# Patient Record
Sex: MALE | Race: OTHER | HISPANIC OR LATINO
[De-identification: names, ages, dates, MRNs, and addresses within clinical notes are randomized per-mention and may not be internally consistent; named-entity substitution may affect disease eponyms.]

---

## 2017-07-06 ENCOUNTER — APPOINTMENT (OUTPATIENT)
Dept: INTERNAL MEDICINE | Facility: CLINIC | Age: 82
End: 2017-07-06

## 2017-07-07 ENCOUNTER — APPOINTMENT (OUTPATIENT)
Dept: INTERNAL MEDICINE | Facility: CLINIC | Age: 82
End: 2017-07-07

## 2017-07-07 ENCOUNTER — OUTPATIENT (OUTPATIENT)
Dept: OUTPATIENT SERVICES | Facility: HOSPITAL | Age: 82
LOS: 1 days | Discharge: HOME | End: 2017-07-07

## 2017-07-07 VITALS
DIASTOLIC BLOOD PRESSURE: 70 MMHG | SYSTOLIC BLOOD PRESSURE: 120 MMHG | WEIGHT: 149 LBS | BODY MASS INDEX: 29.25 KG/M2 | HEIGHT: 60 IN | RESPIRATION RATE: 17 BRPM | HEART RATE: 65 BPM

## 2017-07-07 DIAGNOSIS — Z86.39 PERSONAL HISTORY OF OTHER ENDOCRINE, NUTRITIONAL AND METABOLIC DISEASE: ICD-10-CM

## 2017-07-07 DIAGNOSIS — Z78.9 OTHER SPECIFIED HEALTH STATUS: ICD-10-CM

## 2017-07-07 DIAGNOSIS — M54.5 LOW BACK PAIN: ICD-10-CM

## 2017-07-07 DIAGNOSIS — Z80.9 FAMILY HISTORY OF MALIGNANT NEOPLASM, UNSPECIFIED: ICD-10-CM

## 2017-07-07 DIAGNOSIS — Z87.19 PERSONAL HISTORY OF OTHER DISEASES OF THE DIGESTIVE SYSTEM: ICD-10-CM

## 2017-07-07 DIAGNOSIS — Z86.69 PERSONAL HISTORY OF OTHER DISEASES OF THE NERVOUS SYSTEM AND SENSE ORGANS: ICD-10-CM

## 2017-07-07 DIAGNOSIS — G89.29 LOW BACK PAIN: ICD-10-CM

## 2017-07-10 ENCOUNTER — RESULT REVIEW (OUTPATIENT)
Age: 82
End: 2017-07-10

## 2017-07-11 DIAGNOSIS — K29.70 GASTRITIS, UNSPECIFIED, WITHOUT BLEEDING: ICD-10-CM

## 2017-07-11 DIAGNOSIS — M54.5 LOW BACK PAIN: ICD-10-CM

## 2017-07-11 DIAGNOSIS — H40.9 UNSPECIFIED GLAUCOMA: ICD-10-CM

## 2017-07-11 DIAGNOSIS — I10 ESSENTIAL (PRIMARY) HYPERTENSION: ICD-10-CM

## 2017-07-11 DIAGNOSIS — E11.9 TYPE 2 DIABETES MELLITUS WITHOUT COMPLICATIONS: ICD-10-CM

## 2017-07-14 LAB
25(OH)D3 SERPL-MCNC: 27 NG/ML
ALBUMIN SERPL-MCNC: 3.5 G/DL
ALBUMIN/GLOB SERPL: 0.9
ALP SERPL-CCNC: 237 IU/L
ALT SERPL-CCNC: 95 IU/L
ANION GAP SERPL CALC-SCNC: 8 MEQ/L
APPEARANCE UR: CLEAR
AST SERPL-CCNC: 87 IU/L
BASOPHILS # BLD: 0.03 TH/MM3
BASOPHILS NFR BLD: 0.5 %
BILIRUB SERPL-MCNC: 0.9 MG/DL
BILIRUB UR QL STRIP: NEGATIVE
BUN SERPL-MCNC: 16 MG/DL
BUN/CREAT SERPL: 14.5 %
CALCIUM SERPL-MCNC: 9.7 MG/DL
CHLORIDE SERPL-SCNC: 103 MEQ/L
CHOLEST SERPL-MCNC: 235 MG/DL
CO2 SERPL-SCNC: 27 MEQ/L
COLOR UR: YELLOW
CREAT SERPL-MCNC: 1.1 MG/DL
CREAT UR-MCNC: 117 MG/DL
DIFFERENTIAL METHOD BLD: NORMAL
EOSINOPHIL # BLD: 0.09 TH/MM3
EOSINOPHIL NFR BLD: 1.6 %
ERYTHROCYTE [DISTWIDTH] IN BLOOD BY AUTOMATED COUNT: 16 %
ESTIMATED AVERGAGE GLUCOSE (NORTH): 180 MG/DL
GFR SERPL CREATININE-BSD FRML MDRD: 64
GLUCOSE SERPL-MCNC: 135 MG/DL
GLUCOSE UR STRIP-MCNC: NEGATIVE MG/DL
GRANULOCYTES # BLD: 2.97 TH/MM3
GRANULOCYTES NFR BLD: 53.4 %
HBA1C MFR BLD: 7.9 %
HCT VFR BLD AUTO: 45.8 %
HDLC SERPL-MCNC: 40 MG/DL
HDLC SERPL: 5.88
HGB BLD-MCNC: 15.1 G/DL
HGB UR QL STRIP: NEGATIVE
IMM GRANULOCYTES # BLD: 0.01 TH/MM3
IMM GRANULOCYTES NFR BLD: 0.2 %
KETONES UR STRIP-MCNC: NEGATIVE MG/DL
LDLC SERPL DIRECT ASSAY-MCNC: 130 MG/DL
LYMPHOCYTES # BLD: 1.7 TH/MM3
LYMPHOCYTES NFR BLD: 30.5 %
MCH RBC QN AUTO: 30.9 PG
MCHC RBC AUTO-ENTMCNC: 33 G/DL
MCV RBC AUTO: 93.9 FL
MICROALBUMIN UR-MCNC: 1.5 MG/DL
MICROALBUMIN/CREAT UR-RTO: 13 MG/G
MONOCYTES # BLD: 0.77 TH/MM3
MONOCYTES NFR BLD: 13.8 %
NITRITE UR QL STRIP: NEGATIVE
PH UR STRIP: 6
PLATELET # BLD: 238 TH/MM3
PMV BLD AUTO: 10.7 FL
POTASSIUM SERPL-SCNC: 5 MMOL/L
PROT SERPL-MCNC: 7.4 G/DL
PROT UR STRIP-MCNC: NEGATIVE MG/DL
PSA FREE MFR FLD: 0.32 NG/ML
RBC # BLD AUTO: 4.88 MIL/MM3
SODIUM SERPL-SCNC: 138 MEQ/L
SP GR UR STRIP: 1.01
TRIGL SERPL-MCNC: 193 MG/DL
TSH SERPL DL<=0.005 MIU/L-ACNC: 3.65 UIU/ML
UROBILINOGEN UR STRIP-MCNC: 0.2 MG/DL
VITAMIN D2 SERPL-MCNC: <4 NG/ML
VITAMIN D3 SERPL-MCNC: 27 NG/ML
VLDLC SERPL-MCNC: 38 MG/DL
WBC # BLD: 5.57 TH/MM3
WBC URNS QL MICRO: NEGATIVE

## 2017-08-03 ENCOUNTER — APPOINTMENT (OUTPATIENT)
Dept: GERIATRICS | Facility: CLINIC | Age: 82
End: 2017-08-03

## 2017-10-13 ENCOUNTER — APPOINTMENT (OUTPATIENT)
Dept: GASTROENTEROLOGY | Facility: CLINIC | Age: 82
End: 2017-10-13

## 2018-04-20 ENCOUNTER — INPATIENT (INPATIENT)
Facility: HOSPITAL | Age: 83
LOS: 11 days | Discharge: HOME | End: 2018-05-02
Attending: HOSPITALIST | Admitting: HOSPITALIST

## 2018-04-20 VITALS
HEART RATE: 79 BPM | TEMPERATURE: 99 F | OXYGEN SATURATION: 98 % | DIASTOLIC BLOOD PRESSURE: 56 MMHG | SYSTOLIC BLOOD PRESSURE: 100 MMHG | RESPIRATION RATE: 18 BRPM

## 2018-04-20 LAB
ABO RH CONFIRMATION: SIGNIFICANT CHANGE UP
ALBUMIN SERPL ELPH-MCNC: 3 G/DL — LOW (ref 3.5–5.2)
ALBUMIN SERPL ELPH-MCNC: 3.3 G/DL — LOW (ref 3.5–5.2)
ALBUMIN SERPL ELPH-MCNC: 3.5 G/DL — SIGNIFICANT CHANGE UP (ref 3.5–5.2)
ALP SERPL-CCNC: 166 U/L — HIGH (ref 30–115)
ALP SERPL-CCNC: 179 U/L — HIGH (ref 30–115)
ALP SERPL-CCNC: 218 U/L — HIGH (ref 30–115)
ALT FLD-CCNC: 72 U/L — HIGH (ref 0–41)
ALT FLD-CCNC: 75 U/L — HIGH (ref 0–41)
ALT FLD-CCNC: 88 U/L — HIGH (ref 0–41)
ANION GAP SERPL CALC-SCNC: 13 MMOL/L — SIGNIFICANT CHANGE UP (ref 7–14)
ANION GAP SERPL CALC-SCNC: 14 MMOL/L — SIGNIFICANT CHANGE UP (ref 7–14)
ANION GAP SERPL CALC-SCNC: 15 MMOL/L — HIGH (ref 7–14)
APPEARANCE UR: (no result)
APTT BLD: 30.4 SEC — SIGNIFICANT CHANGE UP (ref 27–39.2)
AST SERPL-CCNC: 111 U/L — HIGH (ref 0–41)
AST SERPL-CCNC: 86 U/L — HIGH (ref 0–41)
AST SERPL-CCNC: 99 U/L — HIGH (ref 0–41)
BACTERIA # UR AUTO: (no result) /HPF
BASE EXCESS BLDV CALC-SCNC: -1 MMOL/L — SIGNIFICANT CHANGE UP (ref -2–2)
BILIRUB SERPL-MCNC: 0.7 MG/DL — SIGNIFICANT CHANGE UP (ref 0.2–1.2)
BILIRUB SERPL-MCNC: 0.7 MG/DL — SIGNIFICANT CHANGE UP (ref 0.2–1.2)
BILIRUB SERPL-MCNC: 0.9 MG/DL — SIGNIFICANT CHANGE UP (ref 0.2–1.2)
BILIRUB UR-MCNC: (no result)
BLD GP AB SCN SERPL QL: SIGNIFICANT CHANGE UP
BUN SERPL-MCNC: 19 MG/DL — SIGNIFICANT CHANGE UP (ref 10–20)
BUN SERPL-MCNC: 19 MG/DL — SIGNIFICANT CHANGE UP (ref 10–20)
BUN SERPL-MCNC: 20 MG/DL — SIGNIFICANT CHANGE UP (ref 10–20)
C DIFF BY PCR RESULT: NEGATIVE — SIGNIFICANT CHANGE UP
C DIFF TOX GENS STL QL NAA+PROBE: SIGNIFICANT CHANGE UP
CA-I SERPL-SCNC: 1.15 MMOL/L — SIGNIFICANT CHANGE UP (ref 1.12–1.3)
CALCIUM SERPL-MCNC: 7.7 MG/DL — LOW (ref 8.5–10.1)
CALCIUM SERPL-MCNC: 8.1 MG/DL — LOW (ref 8.5–10.1)
CALCIUM SERPL-MCNC: 8.5 MG/DL — SIGNIFICANT CHANGE UP (ref 8.5–10.1)
CHLORIDE SERPL-SCNC: 91 MMOL/L — LOW (ref 98–110)
CHLORIDE SERPL-SCNC: 96 MMOL/L — LOW (ref 98–110)
CHLORIDE SERPL-SCNC: 97 MMOL/L — LOW (ref 98–110)
CO2 SERPL-SCNC: 21 MMOL/L — SIGNIFICANT CHANGE UP (ref 17–32)
CO2 SERPL-SCNC: 22 MMOL/L — SIGNIFICANT CHANGE UP (ref 17–32)
CO2 SERPL-SCNC: 23 MMOL/L — SIGNIFICANT CHANGE UP (ref 17–32)
COD CRY URNS QL: (no result) /HPF
COLOR SPEC: (no result)
CREAT SERPL-MCNC: 1.4 MG/DL — SIGNIFICANT CHANGE UP (ref 0.7–1.5)
CREAT SERPL-MCNC: 1.4 MG/DL — SIGNIFICANT CHANGE UP (ref 0.7–1.5)
CREAT SERPL-MCNC: 1.6 MG/DL — HIGH (ref 0.7–1.5)
DIFF PNL FLD: NEGATIVE — SIGNIFICANT CHANGE UP
EPI CELLS # UR: (no result) /HPF
GAS PNL BLDV: 134 MMOL/L — LOW (ref 136–145)
GAS PNL BLDV: SIGNIFICANT CHANGE UP
GLUCOSE SERPL-MCNC: 153 MG/DL — HIGH (ref 70–99)
GLUCOSE SERPL-MCNC: 161 MG/DL — HIGH (ref 70–99)
GLUCOSE SERPL-MCNC: 193 MG/DL — HIGH (ref 70–99)
GLUCOSE UR QL: NEGATIVE MG/DL — SIGNIFICANT CHANGE UP
HCO3 BLDV-SCNC: 25 MMOL/L — SIGNIFICANT CHANGE UP (ref 22–29)
HCT VFR BLD CALC: 36.5 % — LOW (ref 42–52)
HCT VFR BLDA CALC: 31.4 % — LOW (ref 34–44)
HGB BLD CALC-MCNC: 10.2 G/DL — LOW (ref 14–18)
HGB BLD-MCNC: 12.8 G/DL — LOW (ref 14–18)
HYALINE CASTS # UR AUTO: (no result) /LPF
INR BLD: 1.04 RATIO — SIGNIFICANT CHANGE UP (ref 0.65–1.3)
KETONES UR-MCNC: 15
LACTATE BLDV-MCNC: 2.8 MMOL/L — HIGH (ref 0.5–1.6)
LACTATE SERPL-SCNC: 4.3 MMOL/L — CRITICAL HIGH (ref 0.5–2.2)
LEUKOCYTE ESTERASE UR-ACNC: (no result)
LIDOCAIN IGE QN: 28 U/L — SIGNIFICANT CHANGE UP (ref 7–60)
MCHC RBC-ENTMCNC: 32.3 PG — HIGH (ref 27–31)
MCHC RBC-ENTMCNC: 35.1 G/DL — SIGNIFICANT CHANGE UP (ref 32–37)
MCV RBC AUTO: 92.2 FL — SIGNIFICANT CHANGE UP (ref 80–94)
NITRITE UR-MCNC: POSITIVE
NRBC # BLD: 0 /100 WBCS — SIGNIFICANT CHANGE UP (ref 0–0)
PCO2 BLDV: 46 MMHG — SIGNIFICANT CHANGE UP (ref 41–51)
PH BLDV: 7.34 — SIGNIFICANT CHANGE UP (ref 7.26–7.43)
PH UR: 5.5 — SIGNIFICANT CHANGE UP (ref 5–8)
PLATELET # BLD AUTO: 218 K/UL — SIGNIFICANT CHANGE UP (ref 130–400)
PO2 BLDV: 27 MMHG — SIGNIFICANT CHANGE UP (ref 20–40)
POTASSIUM BLDV-SCNC: 5.2 MMOL/L — SIGNIFICANT CHANGE UP (ref 3.3–5.6)
POTASSIUM SERPL-MCNC: 5.8 MMOL/L — HIGH (ref 3.5–5)
POTASSIUM SERPL-MCNC: 5.9 MMOL/L — HIGH (ref 3.5–5)
POTASSIUM SERPL-MCNC: 6.5 MMOL/L — CRITICAL HIGH (ref 3.5–5)
POTASSIUM SERPL-SCNC: 5.8 MMOL/L — HIGH (ref 3.5–5)
POTASSIUM SERPL-SCNC: 5.9 MMOL/L — HIGH (ref 3.5–5)
POTASSIUM SERPL-SCNC: 6.5 MMOL/L — CRITICAL HIGH (ref 3.5–5)
PROT SERPL-MCNC: 6 G/DL — SIGNIFICANT CHANGE UP (ref 6–8)
PROT SERPL-MCNC: 6.3 G/DL — SIGNIFICANT CHANGE UP (ref 6–8)
PROT SERPL-MCNC: 7.2 G/DL — SIGNIFICANT CHANGE UP (ref 6–8)
PROT UR-MCNC: 30 MG/DL
PROTHROM AB SERPL-ACNC: 11.2 SEC — SIGNIFICANT CHANGE UP (ref 9.95–12.87)
RBC # BLD: 3.96 M/UL — LOW (ref 4.7–6.1)
RBC # FLD: 14.8 % — HIGH (ref 11.5–14.5)
SAO2 % BLDV: 52 % — SIGNIFICANT CHANGE UP
SODIUM SERPL-SCNC: 128 MMOL/L — LOW (ref 135–146)
SODIUM SERPL-SCNC: 130 MMOL/L — LOW (ref 135–146)
SODIUM SERPL-SCNC: 134 MMOL/L — LOW (ref 135–146)
SP GR SPEC: 1.02 — SIGNIFICANT CHANGE UP (ref 1.01–1.03)
TYPE + AB SCN PNL BLD: SIGNIFICANT CHANGE UP
UROBILINOGEN FLD QL: 1 MG/DL (ref 0.2–0.2)
WBC # BLD: 16.11 K/UL — HIGH (ref 4.8–10.8)
WBC # FLD AUTO: 16.11 K/UL — HIGH (ref 4.8–10.8)
WBC UR QL: (no result) /HPF

## 2018-04-20 RX ORDER — METRONIDAZOLE 500 MG
500 TABLET ORAL ONCE
Qty: 0 | Refills: 0 | Status: COMPLETED | OUTPATIENT
Start: 2018-04-20 | End: 2018-04-20

## 2018-04-20 RX ORDER — CEFTRIAXONE 500 MG/1
1 INJECTION, POWDER, FOR SOLUTION INTRAMUSCULAR; INTRAVENOUS EVERY 24 HOURS
Qty: 0 | Refills: 0 | Status: DISCONTINUED | OUTPATIENT
Start: 2018-04-21 | End: 2018-04-21

## 2018-04-20 RX ORDER — SODIUM CHLORIDE 9 MG/ML
1000 INJECTION INTRAMUSCULAR; INTRAVENOUS; SUBCUTANEOUS ONCE
Qty: 0 | Refills: 0 | Status: COMPLETED | OUTPATIENT
Start: 2018-04-20 | End: 2018-04-20

## 2018-04-20 RX ORDER — CEFTRIAXONE 500 MG/1
1 INJECTION, POWDER, FOR SOLUTION INTRAMUSCULAR; INTRAVENOUS ONCE
Qty: 0 | Refills: 0 | Status: COMPLETED | OUTPATIENT
Start: 2018-04-20 | End: 2018-04-20

## 2018-04-20 RX ORDER — CIPROFLOXACIN LACTATE 400MG/40ML
400 VIAL (ML) INTRAVENOUS EVERY 12 HOURS
Qty: 0 | Refills: 0 | Status: DISCONTINUED | OUTPATIENT
Start: 2018-04-20 | End: 2018-04-21

## 2018-04-20 RX ORDER — CEFTRIAXONE 500 MG/1
INJECTION, POWDER, FOR SOLUTION INTRAMUSCULAR; INTRAVENOUS
Qty: 0 | Refills: 0 | Status: DISCONTINUED | OUTPATIENT
Start: 2018-04-20 | End: 2018-04-21

## 2018-04-20 RX ORDER — FAMOTIDINE 10 MG/ML
20 INJECTION INTRAVENOUS ONCE
Qty: 0 | Refills: 0 | Status: COMPLETED | OUTPATIENT
Start: 2018-04-20 | End: 2018-04-20

## 2018-04-20 RX ORDER — ONDANSETRON 8 MG/1
4 TABLET, FILM COATED ORAL ONCE
Qty: 0 | Refills: 0 | Status: COMPLETED | OUTPATIENT
Start: 2018-04-20 | End: 2018-04-20

## 2018-04-20 RX ORDER — METRONIDAZOLE 500 MG
TABLET ORAL
Qty: 0 | Refills: 0 | Status: DISCONTINUED | OUTPATIENT
Start: 2018-04-20 | End: 2018-04-21

## 2018-04-20 RX ORDER — METRONIDAZOLE 500 MG
500 TABLET ORAL EVERY 8 HOURS
Qty: 0 | Refills: 0 | Status: DISCONTINUED | OUTPATIENT
Start: 2018-04-21 | End: 2018-04-21

## 2018-04-20 RX ADMIN — ONDANSETRON 4 MILLIGRAM(S): 8 TABLET, FILM COATED ORAL at 16:50

## 2018-04-20 RX ADMIN — Medication 200 MILLIGRAM(S): at 21:08

## 2018-04-20 RX ADMIN — SODIUM CHLORIDE 1000 MILLILITER(S): 9 INJECTION INTRAMUSCULAR; INTRAVENOUS; SUBCUTANEOUS at 18:49

## 2018-04-20 RX ADMIN — SODIUM CHLORIDE 1000 MILLILITER(S): 9 INJECTION INTRAMUSCULAR; INTRAVENOUS; SUBCUTANEOUS at 16:50

## 2018-04-20 RX ADMIN — FAMOTIDINE 20 MILLIGRAM(S): 10 INJECTION INTRAVENOUS at 16:51

## 2018-04-20 RX ADMIN — Medication 100 MILLIGRAM(S): at 20:23

## 2018-04-20 RX ADMIN — CEFTRIAXONE 100 GRAM(S): 500 INJECTION, POWDER, FOR SOLUTION INTRAMUSCULAR; INTRAVENOUS at 18:49

## 2018-04-20 NOTE — ED PROVIDER NOTE - PHYSICAL EXAMINATION
On exam: Pt sitting comfortably on stretcher in NAD. No rash. Conjunctivae and sclera clear. dry MM. RRR, radial pulses 2/4 b/l. Breath sounds present b/l, CTABL, no wheezing or crackles, no tachypnea, no accessory muscle use, good resp effort and excursion, good air exchange, bs present throughout all 4 quadrants. Generalized abd pain, Abd soft, no rebound or guarding, no cvat, (-) murphys, (-) rovsing (-) obturator (-) psoas. FROM of ext, no edema, no calf pain/swelling/erythema. AAOx3, motor 5/5 and sensation intact throughout upper and lower ext.

## 2018-04-20 NOTE — ED PROVIDER NOTE - PROGRESS NOTE DETAILS
Plan: EKG, CXR, labs, IVF, Zofran, Pepcid, urine, imaging, and reassess. pt with uti, abx added, pending rest of labs and imaging,will continue to monitor and reassess. lactate noted, more fluids being given, will repeat. ct noted. abx added, ruq sono also added, cmp re sent will also get vbg for lactate, will continue to monitor and reassess. rectal exam done: brown stool, no melena/brbpr, prostrate examined, no bogginess, no purulent discharge, no pain to palpation. cmp hemolyzed again, lab bryant dmultiple times regarding second cmp hemolyzed, nurse preeti it twice, vbg being sent. pt at ultrasound pt and family aware of all labs and imaging, gnetle fluid hydration due to mild pulmonary edema given, repeat lactate 2.8, pt abd re exam soft nd nt reports feeling better, aware of plan for admission, severe sepsis secondary to uti, colitis, MAR. Dr Madrigal, aware of pt. Dr. Dean Hospitalist aware of pt and admission. pt eating, tolerating po, feeling better at this time.

## 2018-04-20 NOTE — ED PROVIDER NOTE - CARE PLAN
Principal Discharge DX:	Severe sepsis  Secondary Diagnosis:	Colitis  Secondary Diagnosis:	UTI (urinary tract infection)

## 2018-04-20 NOTE — ED PROVIDER NOTE - OBJECTIVE STATEMENT
81 y/o male Sammarinese speaking, family at bedside are translating at the pt’s request, pmhx of DM and depression, recently visiting from [place] came 2 weeks ago, presents for generalized abd pain. No hx of abd surgeries. Associated with diarrhea NB, N/V NBNB, few episodes. No one at home with similar symptoms. Pt notes he ate marquez this morning. Also associated generalized weakness. 81 y/o male Eritrean speaking, family at bedside are translating at the pt’s request, pmhx of DM and depression, recently visiting from [place] came 2 weeks ago, presents for generalized abd pain. No hx of abd surgeries. Associated with diarrhea NB, N/V NBNB, few episodes. No one at home with similar symptoms. Pt notes he ate marquez this morning. Also associated generalized weakness. no etoh abuse. 83 y/o male Cameroonian speaking, family at bedside are translating at the pt’s request, pmhx of DM and depression, recently visiting from [place] came 2 weeks ago, presents for generalized abd pain. No hx of abd surgeries. Associated with diarrhea NB, N/V NBNB, few episodes. No penile pain/discharge, testicular pain/swelling/ erythema, tenesmus. No one at home with similar symptoms. Pt notes he ate marquez this morning. Also associated generalized weakness. no etoh abuse. 83 y/o male Pakistani speaking, family at bedside are translating at the pt’s request, pmhx of DM and depression, recently visiting from [place] came 2 weeks ago, presents for generalized abd pain. No hx of abd surgeries. Associated with diarrhea NB, N/V NBNB, few episodes that started this moring. No penile pain/discharge, testicular pain/swelling/ erythema, tenesmus. No one at home with similar symptoms. Pt notes he ate marquez this morning. Also associated generalized weakness. no etoh abuse.

## 2018-04-20 NOTE — ED PROVIDER NOTE - MEDICAL DECISION MAKING DETAILS
pt aware of all labs and imaging including abnormal liver findings, understands abx given for colitis and uti, plan for admission and agrees, pt faimly at bedside also aware and understand, medical admitting team aware of pt as well.

## 2018-04-20 NOTE — ED PROVIDER NOTE - NS ED ROS FT
Denies fevers/chills, cough, SOB, chest pain, pleuritic CP, constipation, melena/ BRBPR, urinary SX, skin rashes, recent travel or sick contacts. (+) generalzied abd pain, nausea, vomiting, and diarrhea, non-bloody. Denies fevers/chills, cough, SOB, chest pain, pleuritic CP, constipation, melena/ BRBPR, urinary SX, skin rashes, recent travel or sick contacts.

## 2018-04-20 NOTE — ED PROVIDER NOTE - NS_ ATTENDINGSCRIBEDETAILS _ED_A_ED_FT
I have seen this patient with a scribe. Please see documentation of my history and physical examination and plan. I agree with scribe documentation except as indicated in my notes.

## 2018-04-21 LAB
ALBUMIN SERPL ELPH-MCNC: 3.2 G/DL — LOW (ref 3.5–5.2)
ALP SERPL-CCNC: 176 U/L — HIGH (ref 30–115)
ALT FLD-CCNC: 65 U/L — HIGH (ref 0–41)
ANION GAP SERPL CALC-SCNC: 14 MMOL/L — SIGNIFICANT CHANGE UP (ref 7–14)
AST SERPL-CCNC: 72 U/L — HIGH (ref 0–41)
BASOPHILS # BLD AUTO: 0.04 K/UL — SIGNIFICANT CHANGE UP (ref 0–0.2)
BASOPHILS NFR BLD AUTO: 0.3 % — SIGNIFICANT CHANGE UP (ref 0–1)
BILIRUB SERPL-MCNC: 0.7 MG/DL — SIGNIFICANT CHANGE UP (ref 0.2–1.2)
BUN SERPL-MCNC: 17 MG/DL — SIGNIFICANT CHANGE UP (ref 10–20)
CALCIUM SERPL-MCNC: 8.2 MG/DL — LOW (ref 8.5–10.1)
CHLORIDE SERPL-SCNC: 95 MMOL/L — LOW (ref 98–110)
CO2 SERPL-SCNC: 20 MMOL/L — SIGNIFICANT CHANGE UP (ref 17–32)
CREAT SERPL-MCNC: 1.3 MG/DL — SIGNIFICANT CHANGE UP (ref 0.7–1.5)
CULTURE RESULTS: SIGNIFICANT CHANGE UP
EOSINOPHIL # BLD AUTO: 0.2 K/UL — SIGNIFICANT CHANGE UP (ref 0–0.7)
EOSINOPHIL NFR BLD AUTO: 1.6 % — SIGNIFICANT CHANGE UP (ref 0–8)
GLUCOSE SERPL-MCNC: 285 MG/DL — HIGH (ref 70–99)
HCT VFR BLD CALC: 35.4 % — LOW (ref 42–52)
HGB BLD-MCNC: 12.1 G/DL — LOW (ref 14–18)
IMM GRANULOCYTES NFR BLD AUTO: 0.3 % — SIGNIFICANT CHANGE UP (ref 0.1–0.3)
LYMPHOCYTES # BLD AUTO: 1.97 K/UL — SIGNIFICANT CHANGE UP (ref 1.2–3.4)
LYMPHOCYTES # BLD AUTO: 15.7 % — LOW (ref 20.5–51.1)
MAGNESIUM SERPL-MCNC: 1.8 MG/DL — SIGNIFICANT CHANGE UP (ref 1.8–2.4)
MCHC RBC-ENTMCNC: 31.3 PG — HIGH (ref 27–31)
MCHC RBC-ENTMCNC: 34.2 G/DL — SIGNIFICANT CHANGE UP (ref 32–37)
MCV RBC AUTO: 91.5 FL — SIGNIFICANT CHANGE UP (ref 80–94)
MONOCYTES # BLD AUTO: 1.02 K/UL — HIGH (ref 0.1–0.6)
MONOCYTES NFR BLD AUTO: 8.1 % — SIGNIFICANT CHANGE UP (ref 1.7–9.3)
NEUTROPHILS # BLD AUTO: 9.31 K/UL — HIGH (ref 1.4–6.5)
NEUTROPHILS NFR BLD AUTO: 74 % — SIGNIFICANT CHANGE UP (ref 42.2–75.2)
PLATELET # BLD AUTO: 210 K/UL — SIGNIFICANT CHANGE UP (ref 130–400)
POTASSIUM SERPL-MCNC: 4.5 MMOL/L — SIGNIFICANT CHANGE UP (ref 3.5–5)
POTASSIUM SERPL-SCNC: 4.5 MMOL/L — SIGNIFICANT CHANGE UP (ref 3.5–5)
PROT SERPL-MCNC: 6.4 G/DL — SIGNIFICANT CHANGE UP (ref 6–8)
RBC # BLD: 3.87 M/UL — LOW (ref 4.7–6.1)
RBC # FLD: 14.9 % — HIGH (ref 11.5–14.5)
SODIUM SERPL-SCNC: 129 MMOL/L — LOW (ref 135–146)
SPECIMEN SOURCE: SIGNIFICANT CHANGE UP
WBC # BLD: 12.58 K/UL — HIGH (ref 4.8–10.8)
WBC # FLD AUTO: 12.58 K/UL — HIGH (ref 4.8–10.8)

## 2018-04-21 RX ORDER — CEFTRIAXONE 500 MG/1
2 INJECTION, POWDER, FOR SOLUTION INTRAMUSCULAR; INTRAVENOUS EVERY 24 HOURS
Qty: 0 | Refills: 0 | Status: DISCONTINUED | OUTPATIENT
Start: 2018-04-21 | End: 2018-05-02

## 2018-04-21 RX ORDER — SODIUM CHLORIDE 9 MG/ML
1000 INJECTION INTRAMUSCULAR; INTRAVENOUS; SUBCUTANEOUS
Qty: 0 | Refills: 0 | Status: DISCONTINUED | OUTPATIENT
Start: 2018-04-21 | End: 2018-04-21

## 2018-04-21 RX ORDER — ACETAMINOPHEN 500 MG
650 TABLET ORAL EVERY 6 HOURS
Qty: 0 | Refills: 0 | Status: DISCONTINUED | OUTPATIENT
Start: 2018-04-21 | End: 2018-05-02

## 2018-04-21 RX ORDER — ONDANSETRON 8 MG/1
4 TABLET, FILM COATED ORAL EVERY 6 HOURS
Qty: 0 | Refills: 0 | Status: DISCONTINUED | OUTPATIENT
Start: 2018-04-21 | End: 2018-05-02

## 2018-04-21 RX ORDER — METRONIDAZOLE 500 MG
500 TABLET ORAL EVERY 8 HOURS
Qty: 0 | Refills: 0 | Status: DISCONTINUED | OUTPATIENT
Start: 2018-04-21 | End: 2018-05-02

## 2018-04-21 RX ORDER — PANTOPRAZOLE SODIUM 20 MG/1
40 TABLET, DELAYED RELEASE ORAL
Qty: 0 | Refills: 0 | Status: DISCONTINUED | OUTPATIENT
Start: 2018-04-21 | End: 2018-05-02

## 2018-04-21 RX ADMIN — CEFTRIAXONE 100 GRAM(S): 500 INJECTION, POWDER, FOR SOLUTION INTRAMUSCULAR; INTRAVENOUS at 13:29

## 2018-04-21 RX ADMIN — PANTOPRAZOLE SODIUM 40 MILLIGRAM(S): 20 TABLET, DELAYED RELEASE ORAL at 09:04

## 2018-04-21 RX ADMIN — Medication 100 MILLIGRAM(S): at 13:29

## 2018-04-21 RX ADMIN — Medication 100 MILLIGRAM(S): at 05:02

## 2018-04-21 RX ADMIN — Medication 100 MILLIGRAM(S): at 21:31

## 2018-04-21 NOTE — H&P ADULT - PMH
Cirrhosis    Depression    DM (diabetes mellitus)    HTN (hypertension)    UTI (urinary tract infection)

## 2018-04-21 NOTE — H&P ADULT - ASSESSMENT
# Sepsis with lactemia  -likely 2/2 Colitis extending from the cecum to the splenic flexure and UTI  -c/w cipro+ flagyl  -blood culture pending  - lactate 4.3-->2.8  - will RPT LABS    # JOSE with Electrolyte abnormalities  a- hyponatremia- Improving  b- Hyperkalemia- Improving VBG- 5.2  c- JOSE- resolved    #Mass within the hepatic dome and Cirrhosis  -CT shows evidence of cirrhosis  - u/s Nodular hepatic contour, consistent with cirrhosis  - Elevated LFTS  - o/p f/u 83 y/o Divehi speaking male from [place] with a hx of DM, HTN was BIB by family  1 episode of vomiting and 8 episodes of diarrhea.    # Sepsis with lacticemia  -likely 2/2 Colitis extending from the cecum to the splenic flexure and UTI   -c/w Rocephin + flagyl  -blood culture pending  -Ucx pending  - lactate 4.3-->2.8  - Start clears   - Will hold IVF given bibasilar crackles. Wont start lasix at this point unless pt becomes SOB    # JOSE with Electrolyte abnormalities  a- hyponatremia- Improving  b- Hyperkalemia- Improving VBG- 5.2  c- JOSE- resolved    # Diarrhea  - Improving  - stool studies pending  - Pt has a hx of diarrhea in the past ? colitis vs malignancy    #Mass within the hepatic dome and Cirrhosis ? HCC  - Family unaware of cirrhosis- no ETOH use  - hepatitis panel pending, HIV pending  -CT shows evidence of cirrhosis  - U/S shows Nodular hepatic contour, consistent with cirrhosis  - Elevated LFTS  - o/p f/u for MRI to r/o HCC  - Advised to f/u @ the [facility] clinic     # DM  -  c/w FS and start Insulin    # HTN  - Family will bring medications tomorrow     # Disposition  -d/c home  - Pt to return to [place] and then come back  -c/w protonix  - c/w heparin 81 y/o Occitan speaking male from [place] with a hx of DM, HTN was BIB by family  1 episode of vomiting and 8 episodes of diarrhea.    # Sepsis with lacticemia  - T 100.6 and leukocytosis   -likely 2/2 Colitis extending from the cecum to the splenic flexure and UTI   -c/w Rocephin + flagyl  -blood culture pending  -Ucx pending  - lactate 4.3-->2.8  - Start clears   - Will hold IVF given bibasilar crackles. Wont start lasix at this point unless pt becomes SOB    # JOSE with Electrolyte abnormalities  a- hyponatremia- Improving  b- Hyperkalemia- Improving VBG- 5.2  c- JOSE- resolved    # Diarrhea  - Improving  - stool studies pending  - c diff negative. Start Imodium if diarrhea continues  -Advised to increase fluid intake   - Pt has a hx of diarrhea in the past ? colitis vs malignancy    #Mass within the hepatic dome and Cirrhosis ? HCC  - Family unaware of cirrhosis- no ETOH use  - hepatitis panel pending, HIV pending  - CT shows evidence of cirrhosis  - U/S shows Nodular hepatic contour, consistent with cirrhosis  - Elevated LFTS  - o/p f/u for MRI to r/o HCC  - Advised to f/u @ the [facility] clinic     # DM  -  c/w FS and start Insulin    # HTN  - Family will bring medications tomorrow     # Disposition  -d/c home  - Pt to return to [place] and then come back  -c/w protonix  - c/w heparin

## 2018-04-21 NOTE — H&P ADULT - NSHPLABSRESULTS_GEN_ALL_CORE
T(F): 101.4  HR: 64  BP: 117/60  RR: 18  SpO2: 95%                          12.8   16.11 )-----------( 218      ( 2018 17:06 )             36.5       04-20    134<L>  |  97<L>  |  19  ----------------------------<  153<H>  5.8<H>   |  22  |  1.4    Ca    8.1<L>      2018 21:00    TPro  6.3  /  Alb  3.3<L>  /  TBili  0.7  /  DBili  x   /  AST  86<H>  /  ALT  75<H>  /  AlkPhos  179<H>        LIVER FUNCTIONS - ( 2018 21:00 )  Alb: 3.3 g/dL / Pro: 6.3 g/dL / ALK PHOS: 179 U/L / ALT: 75 U/L / AST: 86 U/L / GGT: x             PT/INR - ( 2018 17:06 )   PT: 11.20 sec;   INR: 1.04 ratio         PTT - ( 2018 17:06 )  PTT:30.4 sec          Urinalysis Basic - ( 2018 17:06 )    Color: Orange / Appearance: Cloudy / S.025 / pH: x  Gluc: x / Ketone: 15  / Bili: Small / Urobili: 1.0 mg/dL   Blood: x / Protein: 30 mg/dL / Nitrite: Positive   Leuk Esterase: Small / RBC: x / WBC 6-10 /HPF   Sq Epi: x / Non Sq Epi: Few /HPF / Bacteria: Few /HPF      CT A+P w/IV C  1. Colitis extending from the cecum to the splenic flexure. Differential   etiologies include infectious, inflammatory, and ischemic processes.  2. Pericholecystic inflammation, likely secondary to the colitis.  3. Cirrhosis. Mass within the hepatic dome as above. Nonemergent MRI with   and without contrast is recommended to exclude hepatocellular carcinoma.  4. Evidence of mild pulmonary edema at the lung bases.    u/s  1.  Nodular hepatic contour, consistent with cirrhosis. Mass seen on CT   not discretely identified.    2.  Nonspecific mild pericholecystic fluid and borderline gallbladder   wall thickening. No sonographic evidence for cholecystitis.    EKG  HR- 66  Normal Axis  Q waves leads III-aVF  RVH

## 2018-04-21 NOTE — H&P ADULT - NSHPPHYSICALEXAM_GEN_ALL_CORE
T(F): 101.4  HR: 64  BP: 117/60  RR: 18  SpO2: 95%      PHYSICAL EXAM:  GENERAL: NAD, well-developed  HEAD:  Atraumatic, Normocephalic  EYES: EOMI, PERRLA, conjunctiva and sclera clear  NECK: Supple, No JVD  CHEST/LUNG: Clear to auscultation bilaterally; No wheeze  HEART: Regular rate and rhythm; No murmurs, rubs, or gallops  ABDOMEN: Soft, Nontender, Nondistended; Bowel sounds present  EXTREMITIES:  2+ Peripheral Pulses, No clubbing, cyanosis, or edema  PSYCH: AAOx3  NEUROLOGY: non-focal  SKIN: No rashes or lesions T(F): 101.4  HR: 64  BP: 117/60  RR: 18  SpO2: 95%      PHYSICAL EXAM:  GENERAL: NAD, well-developed  HEAD:  Atraumatic, Normocephalic  EYES: EOMI, PERRLA, conjunctiva and sclera clear  NECK: Supple, No JVD  CHEST/LUNG: bibasilar crackles   HEART: Regular rate and rhythm; No murmurs, rubs, or gallops  ABDOMEN: Soft, Nontender, Nondistended; Bowel sounds present. Neg. Lopez sign   EXTREMITIES:  2+ Peripheral Pulses, No clubbing, cyanosis, or edema  PSYCH: AAOx3  NEUROLOGY: non-focal  SKIN: No rashes or lesions

## 2018-04-21 NOTE — H&P ADULT - HISTORY OF PRESENT ILLNESS
81 y/o Maori speaking male from [place] with a hx of DM, HTN was BIB by family  1 episode of vomiting and 8 episodes of diarrhea.    Per daughter at bedside, pt arrived approx. 2 weeks ago and has been eating at home. He suddenly developed diarrhea this AM with nasuea and an episode of vomiting. She states he has had diarrhea in [place] approx. 6 months apart and was told he had viral infection. He has also had UTI in the past. Presently, pt and family denied a known hx of cirrhosis, ETOH use, blood transfusions, known hx of hepatitis. Pt does attest to weight loss over a course of 1 year although he cannot quantify and also tells me he has poor appetite. He complains of mild dysuria and urinary urgency, but does empty his bladder. Since being admitted to , he did not report further vomiting, but does complain of nausea.   He denied fevers, chills, SOB, CP or weakness     ** Pt's daughter will bring the medications in the morning

## 2018-04-22 LAB
HBV CORE AB SER-ACNC: SIGNIFICANT CHANGE UP
HBV SURFACE AB SER-ACNC: SIGNIFICANT CHANGE UP
HBV SURFACE AG SER-ACNC: SIGNIFICANT CHANGE UP
HCV AB S/CO SERPL IA: 0.33 S/CO — SIGNIFICANT CHANGE UP
HCV AB SERPL-IMP: SIGNIFICANT CHANGE UP

## 2018-04-22 RX ADMIN — PANTOPRAZOLE SODIUM 40 MILLIGRAM(S): 20 TABLET, DELAYED RELEASE ORAL at 08:14

## 2018-04-22 RX ADMIN — CEFTRIAXONE 100 GRAM(S): 500 INJECTION, POWDER, FOR SOLUTION INTRAMUSCULAR; INTRAVENOUS at 13:40

## 2018-04-22 RX ADMIN — Medication 100 MILLIGRAM(S): at 05:49

## 2018-04-22 RX ADMIN — Medication 100 MILLIGRAM(S): at 15:04

## 2018-04-22 RX ADMIN — Medication 100 MILLIGRAM(S): at 21:23

## 2018-04-22 NOTE — PROGRESS NOTE ADULT - SUBJECTIVE AND OBJECTIVE BOX
SUBJECTIVE:    Patient is a 82y old  Male who presents with a chief complaint of Diarrhea (2018 00:03)    Currently admitted to medicine with the primary diagnosis of Severe sepsis     Today is hospital day 2d. This morning he is resting in bed.     PAST MEDICAL & SURGICAL HISTORY  PAST MEDICAL & SURGICAL HISTORY:  HTN (hypertension)  DM (diabetes mellitus)  UTI (urinary tract infection)  Depression  Cirrhosis  No significant past surgical history    SOCIAL HISTORY:    ALLERGIES:  No Known Allergies    MEDICATIONS:  STANDING MEDICATIONS  cefTRIAXone   IVPB 2 Gram(s) IV Intermittent every 24 hours  metroNIDAZOLE  IVPB 500 milliGRAM(s) IV Intermittent every 8 hours  pantoprazole    Tablet 40 milliGRAM(s) Oral before breakfast    PRN MEDICATIONS  acetaminophen   Tablet 650 milliGRAM(s) Oral every 6 hours PRN  ondansetron Injectable 4 milliGRAM(s) IV Push every 6 hours PRN    VITALS:   T(F): 96  HR: 60  BP: 128/59  RR: 20  SpO2: --    LABS:                        12.1   12.58 )-----------( 210      ( 2018 08:43 )             35.4     -    129<L>  |  95<L>  |  17  ----------------------------<  285<H>  4.5   |  20  |  1.3    Ca    8.2<L>      2018 08:43  Mg     1.8         TPro  6.4  /  Alb  3.2<L>  /  TBili  0.7  /  DBili  x   /  AST  72<H>  /  ALT  65<H>  /  AlkPhos  176<H>      PT/INR - ( 2018 17:06 )   PT: 11.20 sec;   INR: 1.04 ratio         PTT - ( 2018 17:06 )  PTT:30.4 sec  Urinalysis Basic - ( 2018 17:06 )    Color: Orange / Appearance: Cloudy / S.025 / pH: x  Gluc: x / Ketone: 15  / Bili: Small / Urobili: 1.0 mg/dL   Blood: x / Protein: 30 mg/dL / Nitrite: Positive   Leuk Esterase: Small / RBC: x / WBC 6-10 /HPF   Sq Epi: x / Non Sq Epi: Few /HPF / Bacteria: Few /HPF            Culture - Urine (collected 2018 17:06)  Source: .Urine Clean Catch (Midstream)  Final Report (2018 22:40):    Culture grew 3 or more types of organisms which indicate    collection contamination; consider recollection only if clinically    indicated.          RADIOLOGY:    PHYSICAL EXAM:  GEN: No acute distress  HEENT: NC/AT  LUNGS: bibasilar crackles  HEART: S1/S2 present.   ABD: Soft, non-tender, non-distended. Bowel sounds present  EXT: no edema  NEURO: AAOX3, Djiboutian speaking

## 2018-04-22 NOTE — CONSULT NOTE ADULT - SUBJECTIVE AND OBJECTIVE BOX
Chief Complaint:  Patient is a 82y old  Male who presents with a chief complaint of Diarrhea (2018 00:03)      HPI:  81 y/o Burundian speaking male from [place] with a hx of DM, HTN was BIB by family  1 episode of vomiting and 8 episodes of diarrhea.    Per daughter at bedside, pt arrived approx. 2 weeks ago and has been eating at home. He suddenly developed diarrhea this AM with nasuea and an episode of vomiting. She states he has had diarrhea in [place] approx. 6 months apart and was told he had viral infection. He has also had UTI in the past. Presently, pt and family denied a known hx of cirrhosis, ETOH use, blood transfusions, known hx of hepatitis. Pt does attest to weight loss over a course of 1 year although he cannot quantify and also tells me he has poor appetite. He complains of mild dysuria and urinary urgency, but does empty his bladder. Since being admitted to , he did not report further vomiting, but does complain of nausea. patient had EGD 10 yrs ago that showed gastritis, never had colonoscopy. no FH of cancer colon. +ve FH of cancer stomach.  He denied fevers, chills, SOB, CP or weakness     ** Pt's daughter will bring the medications in the morning     Allergies:  No Known Allergies      Home Medications:    Hospital Medications:  acetaminophen   Tablet 650 milliGRAM(s) Oral every 6 hours PRN  cefTRIAXone   IVPB 2 Gram(s) IV Intermittent every 24 hours  metroNIDAZOLE  IVPB 500 milliGRAM(s) IV Intermittent every 8 hours  ondansetron Injectable 4 milliGRAM(s) IV Push every 6 hours PRN  pantoprazole    Tablet 40 milliGRAM(s) Oral before breakfast      PMHX/PSHX:  HTN (hypertension)  DM (diabetes mellitus)  UTI (urinary tract infection)  Depression  Cirrhosis  No significant past surgical history      Family history:  No pertinent family history in first degree relatives      Social History:     ROS:   Eyes:  Good vision, no reported pain  ENT:  No sore throat, pain, runny nose, dysphagia  CV:  No pain, palpitations, hypo/hypertension  Resp:  No dyspnea, cough, tachypnea, wheezing  GI:  see H&P  :  No pain, bleeding, incontinence, nocturia  Muscle:  No pain, weakness  Neuro:  No weakness, tingling, memory problems  Psych:  No fatigue, insomnia, mood problems, depression  Endocrine:  No polyuria, polydipsia, cold/heat intolerance  Heme:  No petechiae, ecchymosis, easy bruisability  Skin:  No rash, tattoos, scars, edema      PHYSICAL EXAM:   Vital Signs:  Vital Signs Last 24 Hrs  T(C): 36.2 (2018 13:09), Max: 37.1 (2018 21:25)  T(F): 97.2 (2018 13:09), Max: 98.7 (2018 21:25)  HR: 56 (2018 13:09) (56 - 68)  BP: 129/63 (2018 13:09) (128/59 - 175/77)  BP(mean): --  RR: 18 (2018 13:09) (18 - 20)  SpO2: --  Daily     Daily     GENERAL:  Appears stated age, well-groomed, well-nourished, no distress  HEENT:  NC/AT,  conjunctivae clear and pink, no thyromegaly, nodules, adenopathy, no JVD, sclera -anicteric  CHEST:  Full & symmetric excursion, no increased effort, breath sounds clear  HEART:  Regular rhythm, S1, S2, no murmur/rub/S3/S4, no abdominal bruit, no edema  ABDOMEN:  Soft, non-tender, non-distended, normoactive bowel sounds,  no masses ,no hepato-splenomegaly,   EXTEREMITIES:  no cyanosis,clubbing or edema  SKIN:  No rash/erythema/ecchymoses/petechiae/wounds/abscess/warm/dry  NEURO:  Alert, oriented, no asterixis, no tremor, no encephalopathy    LABS:                        12.1   12.58 )-----------( 210      ( 2018 08:43 )             35.4         129<L>  |  95<L>  |  17  ----------------------------<  285<H>  4.5   |  20  |  1.3    Ca    8.2<L>      2018 08:43  Mg     1.8         TPro  6.4  /  Alb  3.2<L>  /  TBili  0.7  /  DBili  x   /  AST  72<H>  /  ALT  65<H>  /  AlkPhos  176<H>  04-21    LIVER FUNCTIONS - ( 2018 08:43 )  Alb: 3.2 g/dL / Pro: 6.4 g/dL / ALK PHOS: 176 U/L / ALT: 65 U/L / AST: 72 U/L / GGT: x           PT/INR - ( 2018 17:06 )   PT: 11.20 sec;   INR: 1.04 ratio         PTT - ( 2018 17:06 )  PTT:30.4 sec  Urinalysis Basic - ( 2018 17:06 )    Color: Orange / Appearance: Cloudy / S.025 / pH: x  Gluc: x / Ketone: 15  / Bili: Small / Urobili: 1.0 mg/dL   Blood: x / Protein: 30 mg/dL / Nitrite: Positive   Leuk Esterase: Small / RBC: x / WBC 6-10 /HPF   Sq Epi: x / Non Sq Epi: Few /HPF / Bacteria: Few /HPF          Imaging: Chief Complaint:  Patient is a 82y old  Male who presents with a chief complaint of Diarrhea (2018 00:03)      HPI:  83 y/o Mauritian speaking male from [place] with a hx of DM, HTN was BIB by family  1 episode of vomiting and 8 episodes of diarrhea.    Per daughter at bedside, pt arrived approx. 2 weeks ago and has been eating at home. He suddenly developed diarrhea this AM with nasuea and an episode of vomiting. She states he has had diarrhea in [place] approx. 6 months apart and was told he had viral infection. He has also had UTI in the past. Presently, pt and family denied a known hx of cirrhosis, ETOH use, blood transfusions, known hx of hepatitis. Pt does attest to weight loss over a course of 1 year although he cannot quantify and also tells me he has poor appetite. He complains of mild dysuria and urinary urgency, but does empty his bladder. Since being admitted to , he did not report further vomiting, but does complain of nausea. patient had EGD 10 yrs ago that showed gastritis, never had colonoscopy. no FH of cancer colon. +ve FH of cancer stomach.  He denied fevers, chills, SOB, CP or weakness     ** Pt's daughter will bring the medications in the morning     Allergies:  No Known Allergies      Home Medications:    Hospital Medications:  acetaminophen   Tablet 650 milliGRAM(s) Oral every 6 hours PRN  cefTRIAXone   IVPB 2 Gram(s) IV Intermittent every 24 hours  metroNIDAZOLE  IVPB 500 milliGRAM(s) IV Intermittent every 8 hours  ondansetron Injectable 4 milliGRAM(s) IV Push every 6 hours PRN  pantoprazole    Tablet 40 milliGRAM(s) Oral before breakfast      PMHX/PSHX:  HTN (hypertension)  DM (diabetes mellitus)  UTI (urinary tract infection)  Depression  Cirrhosis  No significant past surgical history      Family history:  No pertinent family history in first degree relatives      Social History: no alcohol.    ROS:   Eyes:  Good vision, no reported pain  ENT:  No sore throat, pain, runny nose, dysphagia  CV:  No pain, palpitations, hypo/hypertension  Resp:  No dyspnea, cough, tachypnea, wheezing  GI:  see H&P  :  No pain, bleeding, incontinence, nocturia  Muscle:  No pain, weakness  Neuro:  No weakness, tingling, memory problems  Psych:  No fatigue, insomnia, mood problems, depression  Endocrine:  No polyuria, polydipsia, cold/heat intolerance  Heme:  No petechiae, ecchymosis, easy bruisability  Skin:  No rash, tattoos, scars, edema      PHYSICAL EXAM:   Vital Signs:  Vital Signs Last 24 Hrs  T(C): 36.2 (2018 13:09), Max: 37.1 (2018 21:25)  T(F): 97.2 (2018 13:09), Max: 98.7 (2018 21:25)  HR: 56 (2018 13:09) (56 - 68)  BP: 129/63 (2018 13:09) (128/59 - 175/77)  BP(mean): --  RR: 18 (2018 13:09) (18 - 20)  SpO2: --  Daily     Daily     GENERAL:  Appears stated age, well-groomed, well-nourished, no distress  HEENT:  NC/AT,  conjunctivae clear and pink, no thyromegaly, nodules, adenopathy, no JVD, sclera -anicteric  CHEST:  Full & symmetric excursion, no increased effort, breath sounds clear  HEART:  Regular rhythm, S1, S2, no murmur/rub/S3/S4, no abdominal bruit, no edema  ABDOMEN:  Soft, non-tender, non-distended, normoactive bowel sounds,  no masses ,no hepato-splenomegaly,   EXTEREMITIES:  no cyanosis,clubbing or edema  SKIN:  No rash/erythema/ecchymoses/petechiae/wounds/abscess/warm/dry  NEURO:  Alert, oriented, no asterixis, no tremor, no encephalopathy    LABS:                        12.1   12.58 )-----------( 210      ( 2018 08:43 )             35.4         129<L>  |  95<L>  |  17  ----------------------------<  285<H>  4.5   |  20  |  1.3    Ca    8.2<L>      2018 08:43  Mg     1.8         TPro  6.4  /  Alb  3.2<L>  /  TBili  0.7  /  DBili  x   /  AST  72<H>  /  ALT  65<H>  /  AlkPhos  176<H>      LIVER FUNCTIONS - ( 2018 08:43 )  Alb: 3.2 g/dL / Pro: 6.4 g/dL / ALK PHOS: 176 U/L / ALT: 65 U/L / AST: 72 U/L / GGT: x           PT/INR - ( 2018 17:06 )   PT: 11.20 sec;   INR: 1.04 ratio         PTT - ( 2018 17:06 )  PTT:30.4 sec  Urinalysis Basic - ( 2018 17:06 )    Color: Orange / Appearance: Cloudy / S.025 / pH: x  Gluc: x / Ketone: 15  / Bili: Small / Urobili: 1.0 mg/dL   Blood: x / Protein: 30 mg/dL / Nitrite: Positive   Leuk Esterase: Small / RBC: x / WBC 6-10 /HPF   Sq Epi: x / Non Sq Epi: Few /HPF / Bacteria: Few /HPF          Imaging:

## 2018-04-22 NOTE — PROGRESS NOTE ADULT - ASSESSMENT
82M pmh of DM, HTN p/w 1 episode of vomiting and 8 episodes of diarrhea    #?sepsis with lacticemia  - T 100.6 and leukocytosis on presentation, now afebrile, wbc downtrending  - likely 2/2 Colitis extending from the cecum to the splenic flexure, complicated by concurrent ?UTI   - c/w rocephin + flagyl for now  - blood culture pending  - Ucx contaminated specimen, repeat pending  - on CLD  - GI consulted  - lactate improved after hydration    # JOSE with Electrolyte abnormalities  - hyponatremia- stable  - Hyperkalemia- improved  - JOSE- likely pre-renal, improved    # Diarrhea  - Improving  - stool cx and studies pending  - c diff negative  - encourage po fluid intake as tolerated  - Pt has a hx of diarrhea, unclear etiology    #Mass within the hepatic dome and imaging consistent with cirrhosis  - no ETOH abuse hx, pt/family unaware of dx  - hepatitis panel pending, HIV pending  - CT shows evidence of cirrhosis  - U/S shows Nodular hepatic contour, consistent with cirrhosis  - Elevated LFTs  - MRI as outpt if warranted  - GI c/s pending    # DM  -  c/w FS and insulin    # HTN  - Family will bring home meds to be started, monitoring vitals for now, stable    GI/DVT PPX  From [place]

## 2018-04-22 NOTE — PROGRESS NOTE ADULT - ASSESSMENT
83yo Luxembourgish-speaking male with Past Medical History of DM and HTN admitted for nausea/vomiting and multiple episodes of diarrhea secondary to acute colitis.      Diarrhea secondary to acute colitis: C.diff PCR was negative.  The patient suffers from a persistent, mild leukocytosis.  Continue treatment with IV Rocephin and Flagyl.  GI evaluation requested.  Check stool for O&P, as well as stool leukocytes  Liver Cirrhosis: rule out HCC.  Hepatitis B&C viral studies were negative.  Check AFP and CEA.  Follow-up with GI to decide upon need for further imaging.  Acute Cystitis: see above for antibiotic management.  Acute kidney injury: serum creatinine remains mildly elevated though improved from admission.  Check renal bladder US and repeat BMP in AM  DMII: monitor FS and start Lantus/Lispro if FS >200  GI/DVT prophylaxis

## 2018-04-22 NOTE — CONSULT NOTE ADULT - ASSESSMENT
81 y/o Kazakh speaking male from [place] with a hx of DM, HTN was BIB by family  1 episode of vomiting and 8 episodes of diarrhea non bloody that is resolving. CT scan suggestive of colitis  - Enterocolitis:  mostly infectious  patient feels better  check stool  culture, stool for C diff, giardia and entamoeba  advance diet as tolerated  patient will need to have colonoscopy as outpatient  - Nodular liver seen on imaging with ? liver lesion  no history of liver cirrhosis or alcohol intake  check hepatitis panel  check ELISEO, ASMA, AMA, ceruloplasmin, iron study, alpha 1 antitrypsin, immunoglobulin level, SPEP  will need MRI liver protocol to evaluate liver lesion. 83 y/o Danish speaking male from [place] with a hx of DM, HTN was BIB by family  1 episode of vomiting and 8 episodes of diarrhea non bloody that is resolving. CT scan suggestive of colitis  - Enterocolitis:  mostly infectious  patient feels better  check stool  culture, stool for C diff, giardia and entamoeba  advance diet as tolerated  patient will need to have colonoscopy as outpatient  - Nodular liver seen on imaging with ? liver lesion  no history of liver cirrhosis or alcohol intake  check hepatitis panel  check ELISEO, ASMA, AMA, ceruloplasmin, iron study, alpha 1 antitrypsin, immunoglobulin level, SPEP  Please obtain MRI liver protocol to evaluate liver lesion.  will f/up

## 2018-04-22 NOTE — PROGRESS NOTE ADULT - SUBJECTIVE AND OBJECTIVE BOX
JADEN RAMOS [id number]    Hospitalist Note  83yo Lao-speaking male with Past Medical History of DM and HTN admitted for nausea/vomiting and multiple episodes of diarrhea secondary to acute colitis.      Overnight events/Updates: The patient reports mild improvement from admission.  No further diarrhea reported.    Vital Signs Last 24 Hrs  T(C): 36.2 (22 Apr 2018 13:09), Max: 37.1 (21 Apr 2018 21:25)  T(F): 97.2 (22 Apr 2018 13:09), Max: 98.7 (21 Apr 2018 21:25)  HR: 56 (22 Apr 2018 13:09) (56 - 68)  BP: 129/63 (22 Apr 2018 13:09) (128/59 - 175/77)  BP(mean): --  RR: 18 (22 Apr 2018 13:09) (18 - 20)  SpO2: --    Physical Examination:  General: AAO x 3  HEENT: PERRLA, EOMI  CV= S1 & S2 appreciated  Lungs=CTA BL  Abdominal Examination= + BS, Soft, NT/ND  Extremity Examination= No C/C/E    ROS: No chest pain, no shortness of breath.  All other systems reviewed and are within normal limits except for the complaints in the HPI.    MEDICATIONS  (STANDING):  cefTRIAXone   IVPB 2 Gram(s) IV Intermittent every 24 hours  metroNIDAZOLE  IVPB 500 milliGRAM(s) IV Intermittent every 8 hours  pantoprazole    Tablet 40 milliGRAM(s) Oral before breakfast    MEDICATIONS  (PRN):  acetaminophen   Tablet 650 milliGRAM(s) Oral every 6 hours PRN For Temp greater than 38 C (100.4 F)  ondansetron Injectable 4 milliGRAM(s) IV Push every 6 hours PRN Nausea and/or Vomiting                            12.1   12.58 )-----------( 210      ( 21 Apr 2018 08:43 )             35.4     04-21    129<L>  |  95<L>  |  17  ----------------------------<  285<H>  4.5   |  20  |  1.3    Ca    8.2<L>      21 Apr 2018 08:43  Mg     1.8     04-21    TPro  6.4  /  Alb  3.2<L>  /  TBili  0.7  /  DBili  x   /  AST  72<H>  /  ALT  65<H>  /  AlkPhos  176<H>  04-21      Case discussed with housestaff & family  BATOOL Smyth [id number]

## 2018-04-23 LAB
ANION GAP SERPL CALC-SCNC: 11 MMOL/L — SIGNIFICANT CHANGE UP (ref 7–14)
BUN SERPL-MCNC: 14 MG/DL — SIGNIFICANT CHANGE UP (ref 10–20)
C DIFF BY PCR RESULT: NEGATIVE — SIGNIFICANT CHANGE UP
C DIFF TOX GENS STL QL NAA+PROBE: SIGNIFICANT CHANGE UP
CALCIUM SERPL-MCNC: 8.7 MG/DL — SIGNIFICANT CHANGE UP (ref 8.5–10.1)
CHLORIDE SERPL-SCNC: 99 MMOL/L — SIGNIFICANT CHANGE UP (ref 98–110)
CO2 SERPL-SCNC: 24 MMOL/L — SIGNIFICANT CHANGE UP (ref 17–32)
CREAT SERPL-MCNC: 1.1 MG/DL — SIGNIFICANT CHANGE UP (ref 0.7–1.5)
CULTURE RESULTS: NO GROWTH — SIGNIFICANT CHANGE UP
CULTURE RESULTS: SIGNIFICANT CHANGE UP
GLUCOSE SERPL-MCNC: 143 MG/DL — HIGH (ref 70–99)
HCT VFR BLD CALC: 37.9 % — LOW (ref 42–52)
HGB BLD-MCNC: 13 G/DL — LOW (ref 14–18)
IRON SATN MFR SERPL: 26 % — SIGNIFICANT CHANGE UP (ref 15–50)
IRON SATN MFR SERPL: 71 UG/DL — SIGNIFICANT CHANGE UP (ref 35–150)
MCHC RBC-ENTMCNC: 31.4 PG — HIGH (ref 27–31)
MCHC RBC-ENTMCNC: 34.3 G/DL — SIGNIFICANT CHANGE UP (ref 32–37)
MCV RBC AUTO: 91.5 FL — SIGNIFICANT CHANGE UP (ref 80–94)
NRBC # BLD: 0 /100 WBCS — SIGNIFICANT CHANGE UP (ref 0–0)
PLATELET # BLD AUTO: 236 K/UL — SIGNIFICANT CHANGE UP (ref 130–400)
POTASSIUM SERPL-MCNC: 4.6 MMOL/L — SIGNIFICANT CHANGE UP (ref 3.5–5)
POTASSIUM SERPL-SCNC: 4.6 MMOL/L — SIGNIFICANT CHANGE UP (ref 3.5–5)
RBC # BLD: 4.14 M/UL — LOW (ref 4.7–6.1)
RBC # FLD: 14.7 % — HIGH (ref 11.5–14.5)
SODIUM SERPL-SCNC: 134 MMOL/L — LOW (ref 135–146)
SPECIMEN SOURCE: SIGNIFICANT CHANGE UP
SPECIMEN SOURCE: SIGNIFICANT CHANGE UP
TIBC SERPL-MCNC: 277 UG/DL — SIGNIFICANT CHANGE UP (ref 220–430)
UIBC SERPL-MCNC: 206 UG/DL — SIGNIFICANT CHANGE UP (ref 110–370)
WBC # BLD: 8.09 K/UL — SIGNIFICANT CHANGE UP (ref 4.8–10.8)
WBC # FLD AUTO: 8.09 K/UL — SIGNIFICANT CHANGE UP (ref 4.8–10.8)

## 2018-04-23 RX ADMIN — Medication 100 MILLIGRAM(S): at 06:48

## 2018-04-23 RX ADMIN — Medication 100 MILLIGRAM(S): at 13:10

## 2018-04-23 RX ADMIN — Medication 100 MILLIGRAM(S): at 21:15

## 2018-04-23 RX ADMIN — CEFTRIAXONE 100 GRAM(S): 500 INJECTION, POWDER, FOR SOLUTION INTRAMUSCULAR; INTRAVENOUS at 12:13

## 2018-04-23 RX ADMIN — PANTOPRAZOLE SODIUM 40 MILLIGRAM(S): 20 TABLET, DELAYED RELEASE ORAL at 08:17

## 2018-04-23 NOTE — PROGRESS NOTE ADULT - ASSESSMENT
81yo Samoan-speaking male with Past Medical History of DM and HTN admitted for nausea/vomiting and multiple episodes of diarrhea secondary to acute colitis.      Diarrhea secondary to acute colitis: C.diff PCR was negative.  WBC has normalized from admission (secondary to infectious colitis?).  Continue IV Rocephin and Flagyl.  GI recommendations were reviewed; check ELISEO, ASMA, AMA, ceruloplasmin, iron study, alpha 1 antitrypsin, immunoglobulin level, and SPEP.  Liver Cirrhosis: rule out HCC.  Hepatitis B&C viral studies were negative.  Check AFP and CEA.  Awaiting MRI with liver protocol.  Acute Cystitis: see above for antibiotic management.  Acute kidney injury: serum creatinine has recovered from admission.  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

## 2018-04-23 NOTE — PROGRESS NOTE ADULT - SUBJECTIVE AND OBJECTIVE BOX
SUBJECTIVE:    Patient is a 82y old  Male who presents with a chief complaint of Diarrhea (21 Apr 2018 00:03)    Currently admitted to medicine with the primary diagnosis of Severe sepsis     Today is hospital day 3d. This morning he is resting comfortably in bed and reports no new issues. His pain is intermittently present, and he had one watery bowel movement overnight.     PAST MEDICAL & SURGICAL HISTORY  PAST MEDICAL & SURGICAL HISTORY:  HTN (hypertension)  DM (diabetes mellitus)  UTI (urinary tract infection)  Depression  Cirrhosis  No significant past surgical history    SOCIAL HISTORY:    ALLERGIES:  No Known Allergies    MEDICATIONS:  STANDING MEDICATIONS  cefTRIAXone   IVPB 2 Gram(s) IV Intermittent every 24 hours  metroNIDAZOLE  IVPB 500 milliGRAM(s) IV Intermittent every 8 hours  pantoprazole    Tablet 40 milliGRAM(s) Oral before breakfast    PRN MEDICATIONS  acetaminophen   Tablet 650 milliGRAM(s) Oral every 6 hours PRN  ondansetron Injectable 4 milliGRAM(s) IV Push every 6 hours PRN    VITALS:   T(F): 97.9  HR: 58  BP: 137/63  RR: 20  SpO2: --    LABS:                        13.0   8.09  )-----------( 236      ( 23 Apr 2018 07:25 )             37.9     04-23    134<L>  |  99  |  14  ----------------------------<  143<H>  4.6   |  24  |  1.1    Ca    8.7      23 Apr 2018 07:25      Culture - Blood (collected 21 Apr 2018 08:43)  Source: .Blood None  Preliminary Report (22 Apr 2018 18:01):    No growth to date.    Culture - Urine (collected 21 Apr 2018 02:25)  Source: .Urine Clean Catch (Midstream)  Final Report (23 Apr 2018 12:12):    No growth    Culture - Stool (collected 20 Apr 2018 22:25)  Source: .Stool Feces  Preliminary Report (22 Apr 2018 20:06):    No enteric pathogens to date: Final culture pending    No enteric gram negative rods isolated    Culture - Urine (collected 20 Apr 2018 17:06)  Source: .Urine Clean Catch (Midstream)  Final Report (21 Apr 2018 22:40):    Culture grew 3 or more types of organisms which indicate    collection contamination; consider recollection only if clinically    indicated.          RADIOLOGY:    PHYSICAL EXAM:  GEN: No acute distress  HEENT: NC/AT  LUNGS: Clear to auscultation bilaterally   HEART: S1/S2 present.   ABD: Soft, mildly tender to palpation RUQ/  EXT: no edema  NEURO: AAOX3, at baseline

## 2018-04-23 NOTE — PROGRESS NOTE ADULT - ASSESSMENT
82M pmh of DM, HTN p/w 1 episode of vomiting and 8 episodes of diarrhea    #?sepsis with lacticemia  -improved, patient now with reduced BM, cdif was negative  -remains on ceftriaxone and flagyl     # JOSE with Electrolyte abnormalities  - hyponatremia- stable  - Hyperkalemia- improved  - JOSE- likely pre-renal, improved    # Diarrhea  - Improving  - stool cx and studies pending  - c diff negative  - encouraged po fluid intake as tolerated  - Pt has a hx of diarrhea, unclear etiology    #Mass within the hepatic dome and imaging consistent with cirrhosis  - no ETOH abuse hx, pt/family unaware of dx  - hepatitis panel negative  - CT shows evidence of cirrhosis, MRI pending  - U/S shows Nodular hepatic contour, consistent with cirrhosis  - Elevated LFTs, will monitor  - autoimmune workup ordered, f/u results and GI recs    # DM  -  c/w FS and insulin    # HTN  - Family will bring home meds to be started, monitoring vitals for now, stable    GI/DVT PPX  From [place]

## 2018-04-23 NOTE — PROGRESS NOTE ADULT - SUBJECTIVE AND OBJECTIVE BOX
JADENRACHEL [id number]    Hospitalist Note  81yo Greenlandic-speaking male with Past Medical History of DM and HTN admitted for nausea/vomiting and multiple episodes of diarrhea secondary to acute colitis.      Overnight events/Updates: The patient had a dark stool this morning.  His Hgb has remained stable @ 13.    Vital Signs Last 24 Hrs  T(C): 36.6 (23 Apr 2018 12:28), Max: 36.6 (23 Apr 2018 12:28)  T(F): 97.9 (23 Apr 2018 12:28), Max: 97.9 (23 Apr 2018 12:28)  HR: 58 (23 Apr 2018 12:28) (52 - 58)  BP: 137/63 (23 Apr 2018 12:28) (137/63 - 160/72)  BP(mean): --  RR: 20 (23 Apr 2018 12:28) (18 - 20)  SpO2: --    Physical Examination:  General: AAO x 3  HEENT: PERRLA, EOMI  CV= S1 & S2 appreciated  Lungs=CTA BL  Abdominal Examination= + BS, Soft, NT/ND  Extremity Examination= No C/C/E    ROS: No chest pain, no shortness of breath.  All other systems reviewed and are within normal limits except for the complaints in the HPI.    MEDICATIONS  (STANDING):  cefTRIAXone   IVPB 2 Gram(s) IV Intermittent every 24 hours  metroNIDAZOLE  IVPB 500 milliGRAM(s) IV Intermittent every 8 hours  pantoprazole    Tablet 40 milliGRAM(s) Oral before breakfast    MEDICATIONS  (PRN):  acetaminophen   Tablet 650 milliGRAM(s) Oral every 6 hours PRN For Temp greater than 38 C (100.4 F)  ondansetron Injectable 4 milliGRAM(s) IV Push every 6 hours PRN Nausea and/or Vomiting                            13.0   8.09  )-----------( 236      ( 23 Apr 2018 07:25 )             37.9     04-23    134<L>  |  99  |  14  ----------------------------<  143<H>  4.6   |  24  |  1.1    Ca    8.7      23 Apr 2018 07:25        Case discussed with housestaff & family  BATOOL Smyth [id number]

## 2018-04-24 LAB
ALBUMIN SERPL ELPH-MCNC: 3 G/DL — LOW (ref 3.5–5.2)
ALP SERPL-CCNC: 213 U/L — HIGH (ref 30–115)
ALT FLD-CCNC: 53 U/L — HIGH (ref 0–41)
ANION GAP SERPL CALC-SCNC: 12 MMOL/L — SIGNIFICANT CHANGE UP (ref 7–14)
AST SERPL-CCNC: 77 U/L — HIGH (ref 0–41)
BILIRUB DIRECT SERPL-MCNC: 0.2 MG/DL — SIGNIFICANT CHANGE UP (ref 0–0.2)
BILIRUB INDIRECT FLD-MCNC: 0.3 MG/DL — SIGNIFICANT CHANGE UP (ref 0.2–1.2)
BILIRUB SERPL-MCNC: 0.5 MG/DL — SIGNIFICANT CHANGE UP (ref 0.2–1.2)
BUN SERPL-MCNC: 13 MG/DL — SIGNIFICANT CHANGE UP (ref 10–20)
CALCIUM SERPL-MCNC: 8.5 MG/DL — SIGNIFICANT CHANGE UP (ref 8.5–10.1)
CHLORIDE SERPL-SCNC: 99 MMOL/L — SIGNIFICANT CHANGE UP (ref 98–110)
CO2 SERPL-SCNC: 23 MMOL/L — SIGNIFICANT CHANGE UP (ref 17–32)
CREAT SERPL-MCNC: 1 MG/DL — SIGNIFICANT CHANGE UP (ref 0.7–1.5)
CULTURE RESULTS: SIGNIFICANT CHANGE UP
GLUCOSE SERPL-MCNC: 155 MG/DL — HIGH (ref 70–99)
HCT VFR BLD CALC: 35.8 % — LOW (ref 42–52)
HGB BLD-MCNC: 12.4 G/DL — LOW (ref 14–18)
MCHC RBC-ENTMCNC: 31.5 PG — HIGH (ref 27–31)
MCHC RBC-ENTMCNC: 34.6 G/DL — SIGNIFICANT CHANGE UP (ref 32–37)
MCV RBC AUTO: 90.9 FL — SIGNIFICANT CHANGE UP (ref 80–94)
NRBC # BLD: 0 /100 WBCS — SIGNIFICANT CHANGE UP (ref 0–0)
PLATELET # BLD AUTO: 253 K/UL — SIGNIFICANT CHANGE UP (ref 130–400)
POTASSIUM SERPL-MCNC: 4.6 MMOL/L — SIGNIFICANT CHANGE UP (ref 3.5–5)
POTASSIUM SERPL-SCNC: 4.6 MMOL/L — SIGNIFICANT CHANGE UP (ref 3.5–5)
PROT SERPL-MCNC: 6.6 G/DL — SIGNIFICANT CHANGE UP (ref 6–8)
RBC # BLD: 3.94 M/UL — LOW (ref 4.7–6.1)
RBC # FLD: 14.6 % — HIGH (ref 11.5–14.5)
SMOOTH MUSCLE AB SER-ACNC: SIGNIFICANT CHANGE UP
SODIUM SERPL-SCNC: 134 MMOL/L — LOW (ref 135–146)
SPECIMEN SOURCE: SIGNIFICANT CHANGE UP
WBC # BLD: 7 K/UL — SIGNIFICANT CHANGE UP (ref 4.8–10.8)
WBC # FLD AUTO: 7 K/UL — SIGNIFICANT CHANGE UP (ref 4.8–10.8)

## 2018-04-24 RX ADMIN — Medication 100 MILLIGRAM(S): at 21:25

## 2018-04-24 RX ADMIN — Medication 100 MILLIGRAM(S): at 14:57

## 2018-04-24 RX ADMIN — CEFTRIAXONE 100 GRAM(S): 500 INJECTION, POWDER, FOR SOLUTION INTRAMUSCULAR; INTRAVENOUS at 14:05

## 2018-04-24 RX ADMIN — Medication 100 MILLIGRAM(S): at 05:36

## 2018-04-24 RX ADMIN — PANTOPRAZOLE SODIUM 40 MILLIGRAM(S): 20 TABLET, DELAYED RELEASE ORAL at 07:54

## 2018-04-24 NOTE — DIETITIAN INITIAL EVALUATION ADULT. - FEEDING SKILL
independent/Pt is tolerating liquid diet well and is asking when he will be advanced to regular solids as he is eating everything on his meal trays

## 2018-04-24 NOTE — DIETITIAN INITIAL EVALUATION ADULT. - ORAL INTAKE PTA
Pt's daughter reports pt has good appetite/po intake and denies use of oral nutrition supplements. Follows no specific diet at home and has NKFA./good

## 2018-04-24 NOTE — PROGRESS NOTE ADULT - ASSESSMENT
81yo Arabic-speaking male with Past Medical History of DM and HTN admitted for nausea/vomiting and multiple episodes of diarrhea secondary to acute colitis.      Diarrhea secondary to acute colitis: C.diff PCR was negative.  WBC has normalized from admission (secondary to infectious colitis?).  Continue IV Rocephin and Flagyl.  Awaiting results from ELISEO, ASMA, AMA, ceruloplasmin, iron study, alpha 1 antitrypsin, immunoglobulin level, and SPEP.    Liver Cirrhosis: rule out HCC.  Hepatitis B&C viral studies were negative.  Check AFP and CEA.  Pending MRI with liver protocol.  Acute Cystitis: see above for antibiotic management.  Acute kidney injury: serum creatinine has recovered from admission.  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

## 2018-04-24 NOTE — PROGRESS NOTE ADULT - ASSESSMENT
81 y/o Persian speaking male from [place] with a hx of DM, HTN was brought in by family for 1 episode of vomiting and 8 episodes of diarrhea non bloody that currently resolved,  CT scan suggestive of colitis  - Enterocolitis:  mostly infectious  patient feels better, tolerating diet   advance diet as tolerated to lactose free low residue diet  patient will need to have colonoscopy as outpatient  - Nodular liver seen on imaging with ? liver lesion  no history of liver cirrhosis or alcohol intake  hepatitis panel was negative   check ELISEO, ASMA, AMA, ceruloplasmin, iron study, alpha 1 antitrypsin, immunoglobulin level, SPEP  Please obtain MRI liver protocol to evaluate liver lesion. 81 y/o Amharic speaking male from [place] with a hx of DM, HTN was brought in by family for 1 episode of vomiting and 8 episodes of diarrhea non bloody that currently resolved,  CT scan suggestive of colitis  - Enterocolitis:  most likely infectious which has responded to antibiotics.  patient feels better, tolerating diet   advance diet as tolerated to lactose free low residue diet    Colon cancer screening:  Pt is here visiting from [place] and has a gastroenterologist in his native country who had performed an egd in the past.  We will ask him via a  whether he intends to have colonoscopy with his MD in [place], whether he wants to pursue CRC screening at all, and he would like to pursue it here we will schedule it here later in the week.     Nodular liver seen on imaging with ? liver lesion  no history of liver cirrhosis or alcohol intake  hepatitis panel was negative   check ELISEO, ASMA, AMA, ceruloplasmin, iron study, alpha 1 antitrypsin, immunoglobulin level, SPEP  Please obtain MRI liver protocol to evaluate liver lesion.

## 2018-04-24 NOTE — DIETITIAN INITIAL EVALUATION ADULT. - OTHER INFO
Pt admitted for nausea/vomiting and multiple episodes of diarrhea secondary to acute colitis. Diarrhea has resolved. Pending MRI liver protocol. Reason for Assessment: NPO/clears x5 days

## 2018-04-24 NOTE — PROGRESS NOTE ADULT - SUBJECTIVE AND OBJECTIVE BOX
Chief Complaint:  Patient is a 82y old  Male who presents with a chief complaint of Diarrhea (21 Apr 2018 00:03)      Interval Events:   no acute events overnight, patient feels better, tolerating liquid diet, diarrhea resolved. Pending MRI liver protocol.    Allergies:  No Known Allergies      Home Medications:    Hospital Medications:  acetaminophen   Tablet 650 milliGRAM(s) Oral every 6 hours PRN  cefTRIAXone   IVPB 2 Gram(s) IV Intermittent every 24 hours  metroNIDAZOLE  IVPB 500 milliGRAM(s) IV Intermittent every 8 hours  ondansetron Injectable 4 milliGRAM(s) IV Push every 6 hours PRN  pantoprazole    Tablet 40 milliGRAM(s) Oral before breakfast      PMHX/PSHX:  HTN (hypertension)  DM (diabetes mellitus)  UTI (urinary tract infection)  Depression  Cirrhosis  No significant past surgical history      Family history:  No pertinent family history in first degree relatives      ROS:   Eyes:  Good vision, no reported pain  ENT:  No sore throat, pain, runny nose, dysphagia  CV:  No pain, palpitations, hypo/hypertension  Resp:  No dyspnea, cough, tachypnea, wheezing  GI:  No pain, No nausea, No vomiting,  No fever, No pruritis,  No tarry stools, No dysphagia,  :  No pain, bleeding, incontinence, nocturia  Muscle:  No pain, weakness  Neuro:  No weakness, tingling, memory problems  Psych:  No fatigue, insomnia, mood problems, depression  Endocrine:  No polyuria, polydipsia, cold/heat intolerance  Heme:  No petechiae, ecchymosis, easy bruisability  Skin:  No rash, tattoos, scars, edema      PHYSICAL EXAM:   Vital Signs:  Vital Signs Last 24 Hrs  T(C): 36.1 (24 Apr 2018 07:25), Max: 36.6 (23 Apr 2018 12:28)  T(F): 97 (24 Apr 2018 07:25), Max: 97.9 (23 Apr 2018 12:28)  HR: 64 (24 Apr 2018 07:25) (58 - 64)  BP: 135/62 (24 Apr 2018 07:25) (135/62 - 171/81)  BP(mean): --  RR: 20 (24 Apr 2018 07:25) (20 - 20)  SpO2: --  Daily     Daily     GENERAL:  Appears stated age, well-groomed, well-nourished, no distress  HEENT:  NC/AT,  conjunctivae clear and pink, no thyromegaly, nodules, adenopathy, no JVD, sclera -anicteric  CHEST:  Full & symmetric excursion, no increased effort, breath sounds clear  HEART:  Regular rhythm, S1, S2, no murmur/rub/S3/S4, no abdominal bruit, no edema  ABDOMEN:  Soft, non-tender, non-distended, normoactive bowel sounds,  no masses ,no hepato-splenomegaly,  EXTEREMITIES:  no cyanosis,clubbing or edema  SKIN:  No rash/erythema/ecchymoses/petechiae/wounds/abscess/warm/dry  NEURO:  Alert, oriented, no asterixis, no tremor, no encephalopathy    LABS:                        13.0   8.09  )-----------( 236      ( 23 Apr 2018 07:25 )             37.9     04-23    134<L>  |  99  |  14  ----------------------------<  143<H>  4.6   |  24  |  1.1    Ca    8.7      23 Apr 2018 07:25                Imaging:

## 2018-04-24 NOTE — DIETITIAN INITIAL EVALUATION ADULT. - ENERGY NEEDS
Estimated Calorie Needs: 2273-0555 kcal/day (MSJ x 1.2-1.3 AF)  Estimated Protein Needs: 67-80 gm/day (1-1.2 gm/kg ABW)   Estimated Fluid Needs: 1 ml/kcal

## 2018-04-24 NOTE — PROGRESS NOTE ADULT - SUBJECTIVE AND OBJECTIVE BOX
SUBJECTIVE:    Patient is a 82y old Male who presents with a chief complaint of Diarrhea (21 Apr 2018 00:03)    Currently admitted to medicine with the primary diagnosis of Severe sepsis     Today is hospital day 4d. This morning he is resting comfortably in bed and reports no new issues or overnight events.     PAST MEDICAL & SURGICAL HISTORY  HTN (hypertension)  DM (diabetes mellitus)  UTI (urinary tract infection)  Depression  Cirrhosis  No significant past surgical history    SOCIAL HISTORY:  Negative for smoking/alcohol/drug use.     ALLERGIES:  No Known Allergies    MEDICATIONS:  STANDING MEDICATIONS  cefTRIAXone   IVPB 2 Gram(s) IV Intermittent every 24 hours  metroNIDAZOLE  IVPB 500 milliGRAM(s) IV Intermittent every 8 hours  pantoprazole    Tablet 40 milliGRAM(s) Oral before breakfast    PRN MEDICATIONS  acetaminophen   Tablet 650 milliGRAM(s) Oral every 6 hours PRN  ondansetron Injectable 4 milliGRAM(s) IV Push every 6 hours PRN    VITALS:   T(F): 96.1  HR: 55  BP: 148/71  RR: 20  SpO2: --    LABS:                        12.4   7.00  )-----------( 253      ( 24 Apr 2018 08:23 )             35.8     04-24    134<L>  |  99  |  13  ----------------------------<  155<H>  4.6   |  23  |  1.0    Ca    8.5      24 Apr 2018 08:23    TPro  6.6  /  Alb  3.0<L>  /  TBili  0.5  /  DBili  0.2  /  AST  77<H>  /  ALT  53<H>  /  AlkPhos  213<H>  04-24    Hepatitis C Antibody Test (04.21.18 @ 08:43)    Hepatitis C Virus S/CO Ratio: 0.33 S/CO    Hepatitis C Virus Interpretation: Nonreact:     Hepatitis B Core Antibody, Total (04.21.18 @ 08:43)    Hepatitis B Core Antibody, Total: Nonreact    CAPILLARY BLOOD GLUCOSE  187 (24 Apr 2018 17:00)  204 (24 Apr 2018 11:04)  142 (24 Apr 2018 07:33)    RADIOLOGY:    < from: US Abdomen Limited (04.20.18 @ 20:46) >  IMPRESSION:    1.  Nodular hepatic contour, consistent with cirrhosis. Mass seen on CT   not discretely identified.    2.  Nonspecific mild pericholecystic fluid and borderline gallbladder   wall thickening. No sonographic evidence for cholecystitis.      < from: CT Abdomen and Pelvis w/ IV Cont (04.20.18 @ 18:48) >  IMPRESSION:    1. Colitis extending from the cecum to the splenic flexure. Differential   etiologiesinclude infectious, inflammatory, and ischemic processes.  2. Pericholecystic inflammation, likely secondary to the colitis.  3. Cirrhosis. Mass within the hepatic dome as above. Nonemergent MRI with   and without contrast is recommended to exclude hepatocellular carcinoma.  4. Evidence of mild pulmonary edema at the lung bases.    < end of copied text >        PHYSICAL EXAM:  GEN: No acute distress  LUNGS: Clear to auscultation bilaterally   HEART: S1/S2 present. RRR.   ABD: Soft, non-tender, non-distended. Bowel sounds present  EXT: NC/NC/NE/2+PP/CLARKE  NEURO: AAOX3 SUBJECTIVE:    Patient is a 82y old Male who presents with a chief complaint of Diarrhea (21 Apr 2018 00:03)    Currently admitted to medicine with the primary diagnosis of Severe sepsis     Today is hospital day 4d. Pt BM have improved with only 1 loose stool today.     PAST MEDICAL & SURGICAL HISTORY  HTN (hypertension)  DM (diabetes mellitus)  UTI (urinary tract infection)  Depression  Cirrhosis  No significant past surgical history    SOCIAL HISTORY:  Negative for smoking/alcohol/drug use.     ALLERGIES:  No Known Allergies    MEDICATIONS:  STANDING MEDICATIONS  cefTRIAXone   IVPB 2 Gram(s) IV Intermittent every 24 hours  metroNIDAZOLE  IVPB 500 milliGRAM(s) IV Intermittent every 8 hours  pantoprazole    Tablet 40 milliGRAM(s) Oral before breakfast    PRN MEDICATIONS  acetaminophen   Tablet 650 milliGRAM(s) Oral every 6 hours PRN  ondansetron Injectable 4 milliGRAM(s) IV Push every 6 hours PRN    VITALS:   T(F): 96.1  HR: 55  BP: 148/71  RR: 20  SpO2: --    LABS:                        12.4   7.00  )-----------( 253      ( 24 Apr 2018 08:23 )             35.8     04-24    134<L>  |  99  |  13  ----------------------------<  155<H>  4.6   |  23  |  1.0    Ca    8.5      24 Apr 2018 08:23    TPro  6.6  /  Alb  3.0<L>  /  TBili  0.5  /  DBili  0.2  /  AST  77<H>  /  ALT  53<H>  /  AlkPhos  213<H>  04-24    Urine Microscopic-Add On (NC) (04.20.18 @ 17:06)    Calcium Oxalate Crystals: Few /HPF    Bacteria: Few /HPF    Epithelial Cells: Few /HPF    White Blood Cell - Urine: 6-10 /HPF    Hyaline Casts: 1-2 /LPF    Culture - Urine (04.21.18 @ 02:25)    Specimen Source: .Urine Clean Catch (Midstream)    Culture Results:   No growth    Hepatitis C Antibody Test (04.21.18 @ 08:43)    Hepatitis C Virus S/CO Ratio: 0.33 S/CO    Hepatitis C Virus Interpretation: Nonreact:     Hepatitis B Core Antibody, Total (04.21.18 @ 08:43)    Hepatitis B Core Antibody, Total: Nonreact    CAPILLARY BLOOD GLUCOSE  187 (24 Apr 2018 17:00)  204 (24 Apr 2018 11:04)  142 (24 Apr 2018 07:33)    RADIOLOGY:    < from: US Abdomen Limited (04.20.18 @ 20:46) >  IMPRESSION:    1.  Nodular hepatic contour, consistent with cirrhosis. Mass seen on CT   not discretely identified.    2.  Nonspecific mild pericholecystic fluid and borderline gallbladder   wall thickening. No sonographic evidence for cholecystitis.      < from: CT Abdomen and Pelvis w/ IV Cont (04.20.18 @ 18:48) >  IMPRESSION:    1. Colitis extending from the cecum to the splenic flexure. Differential   etiologiesinclude infectious, inflammatory, and ischemic processes.  2. Pericholecystic inflammation, likely secondary to the colitis.  3. Cirrhosis. Mass within the hepatic dome as above. Nonemergent MRI with   and without contrast is recommended to exclude hepatocellular carcinoma.  4. Evidence of mild pulmonary edema at the lung bases.    < end of copied text >        PHYSICAL EXAM:  GEN: No acute distress  HEENT: NC/AT  LUNGS: Clear to auscultation bilaterally   HEART: S1/S2 present. RRR.   ABD: Soft, non-tender, non-distended. Bowel sounds present  EXT: NC/NC/NE/2+PP/CLARKE  NEURO: AAOX3

## 2018-04-24 NOTE — PROGRESS NOTE ADULT - SUBJECTIVE AND OBJECTIVE BOX
JADEN RAMOS [id number]    Hospitalist Note  83yo Urdu-speaking male with Past Medical History of DM and HTN admitted for nausea/vomiting and multiple episodes of diarrhea secondary to acute colitis.      Overnight events/Updates: The patient had only one soft bowel movement over the past 24 hours.  WBC has normalized from admission.    Vital Signs Last 24 Hrs  T(C): 35.6 (24 Apr 2018 12:04), Max: 36.1 (24 Apr 2018 07:25)  T(F): 96.1 (24 Apr 2018 12:04), Max: 97 (24 Apr 2018 07:25)  HR: 55 (24 Apr 2018 12:04) (55 - 64)  BP: 148/71 (24 Apr 2018 12:04) (135/62 - 171/81)  BP(mean): --  RR: 20 (24 Apr 2018 12:04) (20 - 20)  SpO2: --    Physical Examination:  General: AAO x 3  HEENT: PERRLA, EOMI, PERRLA, EOMI  CV= S1 & S2 appreciated  Lungs=CTA BL  Abdominal Examination= + BS, Soft, NT/ND  Extremity Examination= No C/C/E    ROS: No chest pain, no shortness of breath.  All other systems reviewed and are within normal limits except for the complaints in the HPI.    MEDICATIONS  (STANDING):  cefTRIAXone   IVPB 2 Gram(s) IV Intermittent every 24 hours  metroNIDAZOLE  IVPB 500 milliGRAM(s) IV Intermittent every 8 hours  pantoprazole    Tablet 40 milliGRAM(s) Oral before breakfast    MEDICATIONS  (PRN):  acetaminophen   Tablet 650 milliGRAM(s) Oral every 6 hours PRN For Temp greater than 38 C (100.4 F)  ondansetron Injectable 4 milliGRAM(s) IV Push every 6 hours PRN Nausea and/or Vomiting                            12.4   7.00  )-----------( 253      ( 24 Apr 2018 08:23 )             35.8     04-24    134<L>  |  99  |  13  ----------------------------<  155<H>  4.6   |  23  |  1.0    Ca    8.5      24 Apr 2018 08:23    TPro  6.6  /  Alb  3.0<L>  /  TBili  0.5  /  DBili  0.2  /  AST  77<H>  /  ALT  53<H>  /  AlkPhos  213<H>  04-24      Case discussed with housestaff & family  BATOOL Smyth 1775

## 2018-04-24 NOTE — PROGRESS NOTE ADULT - ASSESSMENT
82M pmh of DM, HTN p/w 1 episode of vomiting and 8 episodes of diarrhea    #?sepsis with lacticemia  - 1 loose BM today  - c diff was negative  - Remains on ceftriaxone and flagyl     # JOSE with Electrolyte abnormalities  - Hyponatremia- stable  - Hyperkalemia- improved  - JOSE- likely pre-renal, resolved    # Diarrhea  - Improving  - Stool cx negative  - c diff negative  - Encouraged po fluid intake as tolerated  - Pt has a hx of diarrhea, unclear etiology    #Mass within the hepatic dome and imaging consistent with cirrhosis  - f/u GI recs  - Smooth muscle antibody pending  - CT shows evidence of cirrhosis, MRI pending  - No ETOH abuse hx, pt/family unaware of dx  - Hepatitis panel negative  - U/S shows Nodular hepatic contour, consistent with cirrhosis  - Elevated LFTs, will monitor    # DM  - glucose stable  - c/w FS and insulin    # HTN  - BP stable  - Family will bring home meds to be started    GI/DVT PPX  From [place] 82M pmh of DM, HTN p/w 1 episode of vomiting and 8 episodes of diarrhea    #?sepsis with lacticemia  - 1 loose BM today  - c diff was negative  - Remains on ceftriaxone and flagyl     # JOSE with Electrolyte abnormalities  - Hyponatremia- stable  - Hyperkalemia- improved  - JOSE- likely pre-renal, resolved    # Diarrhea  - Improving  - Stool cx negative  - c diff negative  - Encouraged po fluid intake as tolerated  - Pt has a hx of diarrhea, unclear etiology    #Mass within the hepatic dome and imaging consistent with cirrhosis  - f/u GI recs  - ELISEO, Anti-SM, AMA, Cerruloplasmin, A1A received by lab and pending result  - CT shows evidence of cirrhosis, MRI pending  - No ETOH abuse hx, pt/family unaware of dx  - Hepatitis panel negative  - U/S shows Nodular hepatic contour, consistent with cirrhosis  - Elevated LFTs, will monitor    #Cystitis  - Ucx showed no growth although U/A showed 6-10 WBC's and few bacteria  - Continue Ceftriaxone    # DM  - glucose stable  - c/w FS and insulin    # HTN  - BP stable  - Family will bring home meds to be started    GI/DVT PPX  From [place]

## 2018-04-25 DIAGNOSIS — N18.2 CHRONIC KIDNEY DISEASE, STAGE 2 (MILD): ICD-10-CM

## 2018-04-25 DIAGNOSIS — E87.1 HYPO-OSMOLALITY AND HYPONATREMIA: ICD-10-CM

## 2018-04-25 DIAGNOSIS — R16.0 HEPATOMEGALY, NOT ELSEWHERE CLASSIFIED: ICD-10-CM

## 2018-04-25 DIAGNOSIS — E11.9 TYPE 2 DIABETES MELLITUS WITHOUT COMPLICATIONS: ICD-10-CM

## 2018-04-25 DIAGNOSIS — K52.9 NONINFECTIVE GASTROENTERITIS AND COLITIS, UNSPECIFIED: ICD-10-CM

## 2018-04-25 LAB
AFP-TM SERPL-MCNC: 4.7 NG/ML — SIGNIFICANT CHANGE UP
ANA TITR SER: NEGATIVE — SIGNIFICANT CHANGE UP
CEA SERPL-MCNC: 4.9 NG/ML — HIGH (ref 0–3.8)
FERRITIN SERPL-MCNC: 187 NG/ML — SIGNIFICANT CHANGE UP (ref 30–400)

## 2018-04-25 PROCEDURE — 99222 1ST HOSP IP/OBS MODERATE 55: CPT

## 2018-04-25 RX ORDER — SOD SULF/SODIUM/NAHCO3/KCL/PEG
4000 SOLUTION, RECONSTITUTED, ORAL ORAL ONCE
Qty: 0 | Refills: 0 | Status: COMPLETED | OUTPATIENT
Start: 2018-04-25 | End: 2018-04-25

## 2018-04-25 RX ADMIN — Medication 100 MILLIGRAM(S): at 06:55

## 2018-04-25 RX ADMIN — Medication 100 MILLIGRAM(S): at 22:16

## 2018-04-25 RX ADMIN — CEFTRIAXONE 100 GRAM(S): 500 INJECTION, POWDER, FOR SOLUTION INTRAMUSCULAR; INTRAVENOUS at 13:06

## 2018-04-25 RX ADMIN — Medication 100 MILLIGRAM(S): at 14:07

## 2018-04-25 RX ADMIN — PANTOPRAZOLE SODIUM 40 MILLIGRAM(S): 20 TABLET, DELAYED RELEASE ORAL at 08:06

## 2018-04-25 RX ADMIN — Medication 4000 MILLILITER(S): at 19:24

## 2018-04-25 NOTE — PROGRESS NOTE ADULT - PROBLEM SELECTOR PLAN 4
continue with insulin, controlled, will order hemoglobin a1c ,   will need close follow up outaptient

## 2018-04-25 NOTE — CONSULT NOTE PEDS - ASSESSMENT
82 year old Male with    PLAN  1. 82 year old Male with dysphagia, liquids > solids.     PLAN  1.	No acute ENT intervention  2.	Consider SLP c/s for evaluation & possible diet modification

## 2018-04-25 NOTE — PROGRESS NOTE ADULT - SUBJECTIVE AND OBJECTIVE BOX
SUBJECTIVE:    Patient is a 82y old Male who presents with a chief complaint of Diarrhea (21 Apr 2018 00:03)    Currently admitted to medicine with the primary diagnosis of Severe sepsis     Today is hospital day 5d. Today pt states he still has abdominal pain which is improving. He has no diarrhea. Tolerating PO diet.    PAST MEDICAL & SURGICAL HISTORY  HTN (hypertension)  DM (diabetes mellitus)  UTI (urinary tract infection)  Depression  Cirrhosis  No significant past surgical history    SOCIAL HISTORY:  Negative for smoking/alcohol/drug use.     ALLERGIES:  No Known Allergies    MEDICATIONS:  STANDING MEDICATIONS  cefTRIAXone   IVPB 2 Gram(s) IV Intermittent every 24 hours  metroNIDAZOLE  IVPB 500 milliGRAM(s) IV Intermittent every 8 hours  pantoprazole    Tablet 40 milliGRAM(s) Oral before breakfast    PRN MEDICATIONS  acetaminophen   Tablet 650 milliGRAM(s) Oral every 6 hours PRN  ondansetron Injectable 4 milliGRAM(s) IV Push every 6 hours PRN    VITALS:   T(F): 98.1  HR: 66  BP: 117/58  RR: 20  SpO2: --    LABS:               12.4   7.00  )-----------( 253      ( 24 Apr 2018 08:23 )             35.8     04-24    134<L>  |  99  |  13  ----------------------------<  155<H>  4.6   |  23  |  1.0    Ca    8.5      24 Apr 2018 08:23    TPro  6.6  /  Alb  3.0<L>  /  TBili  0.5  /  DBili  0.2  /  AST  77<H>  /  ALT  53<H>  /  AlkPhos  213<H>  04-24      Culture - Stool (collected 23 Apr 2018 08:43)  Source: .Stool Feces  Preliminary Report (25 Apr 2018 07:58):    No enteric gram negative rods isolated    CAPILLARY BLOOD GLUCOSE  128 (25 Apr 2018 08:05)  187 (24 Apr 2018 17:00)        RADIOLOGY:    < from: US Abdomen Limited (04.20.18 @ 20:46) >  IMPRESSION:    1.  Nodular hepatic contour, consistent with cirrhosis. Mass seen on CT   not discretely identified.    2.  Nonspecific mild pericholecystic fluid and borderline gallbladder   wall thickening. No sonographic evidence for cholecystitis.      PHYSICAL EXAM:  GEN: No acute distress  HEENT: NC/AT, EOMI  LUNGS: Clear to auscultation bilaterally   HEART: S1/S2 present. No S3S4 No MRG RRR.   ABD: Soft, TTP to bilateral RUQ and LUQ distention. Bowel sounds present  EXT: NC/NC/NE/2+PP/CLARKE  NEURO: AAOX3, no focal deficits

## 2018-04-25 NOTE — PROGRESS NOTE ADULT - ASSESSMENT
83 y/o Frisian speaking male from [place] with a hx of DM, HTN was brought in by family for 1 episode of vomiting and 8 episodes of diarrhea non bloody that currently resolved,  CT scan suggestive of colitis  - Enterocolitis:  most likely infectious which has responded to antibiotics.  patient feels better, tolerating diet       Colon cancer screening: patient never had colonoscopy before, he [place] stay in [place] a [place] and wants to have it here. will arrange for colonoscopy tomorrow.     Nodular liver seen on imaging with ? liver lesion  no history of liver cirrhosis or alcohol intake  hepatitis panel was negative   Pending ELISEO,  AMA, ceruloplasmin,  alpha 1 antitrypsin, immunoglobulin level, SPEP, AFP  Pending  MRI liver protocol to evaluate liver lesion 81 y/o Indonesian speaking male from [place] with a hx of DM, HTN was brought in by family for 1 episode of vomiting and 8 episodes of diarrhea non bloody that currently resolved,  CT scan suggestive of colitis  - Enterocolitis:  most likely infectious which has responded to antibiotics.  patient feels better, tolerating diet   will arrange for colonoscopy tomorrow.    Abnormal LFTs/  Nodular liver seen on imaging with ? liver lesion  no history of liver cirrhosis or alcohol intake  hepatitis panel was negative   Pending ELISEO,  AMA, ceruloplasmin,  alpha 1 antitrypsin, immunoglobulin level, SPEP, AFP  Pending  MRI liver protocol to evaluate liver lesion  will follow up 81 y/o Malay speaking male from [place] with a hx of DM, HTN was brought in by family for 1 episode of vomiting and 8 episodes of diarrhea non bloody that currently resolved,  CT scan suggestive of colitis  - Enterocolitis:  most likely infectious which has responded to antibiotics.  patient feels better, tolerating clears  will arrange for colonoscopy tomorrow.    Abnormal LFTs/  Nodular liver seen on imaging with ? liver lesion  no history of liver cirrhosis or alcohol intake  Bloodwork is not consistent with cirrhosis  hepatitis panel was negative   Pending ELISEO,  AMA, ceruloplasmin,  alpha 1 antitrypsin, immunoglobulin level, SPEP, AFP  Pending  MRI liver protocol to evaluate liver lesion  consider outpatient liver biopsy if diagnosis of cirrhosis has not been confirmed or ruled out at the time of discharge OR if liver enzymes are still elevated.  will follow up

## 2018-04-25 NOTE — PROGRESS NOTE ADULT - SUBJECTIVE AND OBJECTIVE BOX
Chief Complaint:  Patient is a 82y old  Male who presents with a chief complaint of Diarrhea (21 Apr 2018 00:03)      Interval Events:   no acute events overnight, diarrhea and vomiting resolved. patient never had colonoscopy before.    Allergies:  No Known Allergies      Home Medications:    Hospital Medications:  acetaminophen   Tablet 650 milliGRAM(s) Oral every 6 hours PRN  cefTRIAXone   IVPB 2 Gram(s) IV Intermittent every 24 hours  metroNIDAZOLE  IVPB 500 milliGRAM(s) IV Intermittent every 8 hours  ondansetron Injectable 4 milliGRAM(s) IV Push every 6 hours PRN  pantoprazole    Tablet 40 milliGRAM(s) Oral before breakfast      PMHX/PSHX:  HTN (hypertension)  DM (diabetes mellitus)  UTI (urinary tract infection)  Depression  Cirrhosis  No significant past surgical history      Family history:  No pertinent family history in first degree relatives      ROS:   Eyes:  Good vision, no reported pain  ENT:  No sore throat, pain, runny nose, dysphagia  CV:  No pain, palpitations, hypo/hypertension  Resp:  No dyspnea, cough, tachypnea, wheezing  GI:  No pain, No nausea, No vomiting, No diarrhea, No constipation,  No fever, No pruritis, No rectal bleeding, No tarry stools, No dysphagia,  :  No pain, bleeding, incontinence, nocturia  Muscle:  No pain, weakness  Neuro:  No weakness, tingling, memory problems  Psych:  No fatigue, insomnia, mood problems, depression  Endocrine:  No polyuria, polydipsia, cold/heat intolerance  Heme:  No petechiae, ecchymosis, easy bruisability  Skin:  No rash, tattoos, scars, edema      PHYSICAL EXAM:   Vital Signs:  Vital Signs Last 24 Hrs  T(C): 36.5 (25 Apr 2018 05:29), Max: 36.5 (25 Apr 2018 05:29)  T(F): 97.7 (25 Apr 2018 05:29), Max: 97.7 (25 Apr 2018 05:29)  HR: 61 (25 Apr 2018 05:29) (55 - 61)  BP: 145/67 (25 Apr 2018 05:29) (137/67 - 148/71)  BP(mean): --  RR: 18 (24 Apr 2018 21:43) (18 - 20)  SpO2: --  Daily Height in cm: 162.56 (24 Apr 2018 13:15)    Daily     GENERAL:  Appears stated age, well-groomed, well-nourished, no distress  HEENT:  NC/AT,  conjunctivae clear and pink, no thyromegaly, nodules, adenopathy, no JVD, sclera -anicteric  CHEST:  Full & symmetric excursion, no increased effort, breath sounds clear  HEART:  Regular rhythm, S1, S2, no murmur/rub/S3/S4, no abdominal bruit, no edema  ABDOMEN:  Soft, non-tender, non-distended, normoactive bowel sounds,  no masses ,no hepato-splenomegaly,   EXTEREMITIES:  no cyanosis,clubbing or edema  SKIN:  No rash/erythema/ecchymoses/petechiae/wounds/abscess/warm/dry  NEURO:  Alert, oriented, no asterixis, no tremor, no encephalopathy    LABS:                        12.4   7.00  )-----------( 253      ( 24 Apr 2018 08:23 )             35.8     04-24    134<L>  |  99  |  13  ----------------------------<  155<H>  4.6   |  23  |  1.0    Ca    8.5      24 Apr 2018 08:23    TPro  6.6  /  Alb  3.0<L>  /  TBili  0.5  /  DBili  0.2  /  AST  77<H>  /  ALT  53<H>  /  AlkPhos  213<H>  04-24    LIVER FUNCTIONS - ( 24 Apr 2018 08:23 )  Alb: 3.0 g/dL / Pro: 6.6 g/dL / ALK PHOS: 213 U/L / ALT: 53 U/L / AST: 77 U/L / GGT: x                   Imaging:

## 2018-04-25 NOTE — PROGRESS NOTE ADULT - ASSESSMENT
82M pmh of DM, HTN p/w 1 episode of vomiting and 8 episodes of diarrhea    #?sepsis with lacticemia  - No Diarrhea, no loose stools  - C diff was negative  - Continue on ceftriaxone and flagyl     # Diarrhea  - Resolved  - C Diff and Stool cx negative  - NPO @ Midnight for colonscopy in AM  - Will give Dulcolax 15mg PO then Go lytely prep then another Go Lytely 15mg PO.   - Encouraged po fluid intake as tolerated    #Mass within the hepatic dome and imaging consistent with cirrhosis  - ELISEO, Anti-SM, AMA, Cerruloplasmin, A1A received by lab and pending result  - CT shows evidence of cirrhosis, MRI pending  - No ETOH abuse hx, pt/family unaware of dx  - Hepatitis panel negative  - U/S shows Nodular hepatic contour, consistent with cirrhosis  - Elevated LFTs, will monitor    # JOSE with Electrolyte abnormalities  - Hyponatremia- stable  - Hyperkalemia- resolved  - JOSE- likely pre-renal, resolved    #Cystitis  - Ucx showed no growth although U/A showed 6-10 WBC's and few bacteria  - Continue Ceftriaxone    # DM  - glucose stable  - c/w FS and insulin    # HTN  - BP stable  - Family will bring home meds to be started    GI/DVT PPX  From [place]

## 2018-04-25 NOTE — CONSULT NOTE PEDS - SUBJECTIVE AND OBJECTIVE BOX
Patient is a 82y old Male brought to ED for multiple episodes of diarrhea, admitted with acute colitis, JOSE (now resolved), ?cirrhosis. Pt c/o     PAST MEDICAL & SURGICAL HISTORY:  HTN (hypertension)  DM (diabetes mellitus)  UTI (urinary tract infection)  Depression  No significant past surgical history    MEDS: acetaminophen   Tablet 650 milliGRAM(s) PRN  bisacodyl 15 milliGRAM(s) PRN  cefTRIAXone   IVPB 2 Gram(s)  metroNIDAZOLE  IVPB 500 milliGRAM(s)  ondansetron Injectable 4 milliGRAM(s) PRN  pantoprazole    Tablet 40 milliGRAM(s)  polyethylene glycol/electrolyte Solution. 4000 milliLiter(s)    ALLERGIES: No Known Allergies    VS: T(F): 98.1, Max: 98.1 ( @ 12:32)  HR: 66 (55 - 66)  BP: 117/58 (117/58 - 145/67)  RR: 20  GEN:  HEENT:    LABS/IMAGIN.4   7.00  )-----------( 253      ( 2018 08:23 )             35.8     -    134<L>  |  99  |  13  ----------------------------<  155<H>  4.6   |  23  |  1.0    Ca    8.5      2018 08:23    TPro  6.6  /  Alb  3.0<L>  /  TBili  0.5  /  DBili  0.2  /  AST  77<H>  /  ALT  53<H>  /  AlkPhos  213<H>  24    Culture - Stool (collected 2018 08:43)  Source: .Stool Feces  Preliminary Report (2018 07:58):    No enteric gram negative rods isolated Patient is a 82y old Male brought to ED for multiple episodes of diarrhea, admitted with acute colitis, JOSE (now resolved), ?cirrhosis.  #[id number]. Pt c/o dysphagia, liquids>solids. Sometimes coughs when drinking liquids. Denies choking with PO intake, odynophagia, globulus sensation, fever/chills. Also notes he intermittently has b/l submandibular pain and pain to hard palate, near teeth. No neck pain currently.    PAST MEDICAL & SURGICAL HISTORY:  HTN (hypertension)  DM (diabetes mellitus)  UTI (urinary tract infection)  Depression  No significant past surgical history    MEDS: acetaminophen   Tablet 650 milliGRAM(s) PRN  bisacodyl 15 milliGRAM(s) PRN  cefTRIAXone   IVPB 2 Gram(s)  metroNIDAZOLE  IVPB 500 milliGRAM(s)  ondansetron Injectable 4 milliGRAM(s) PRN  pantoprazole    Tablet 40 milliGRAM(s)  polyethylene glycol/electrolyte Solution. 4000 milliLiter(s)    ALLERGIES: No Known Allergies    VS: T(F): 98.1, Max: 98.1 ( @ 12:32)  HR: 66 (55 - 66)  BP: 117/58 (117/58 - 145/67)  RR: 20  GEN: Alert, NAD  HEENT: NC/AT, posterior OP pink without erythema, no tonsillar hypertrophy, uvula midline, mucosa moist, neck supple, no LAD or palpable masses  Fiberoptic flexible laryngoscopy: airway patent, no masses/erythema/irritation throughout NP/OP/HP, VC mobile and approximating    LABS/IMAGIN.4   7.00  )-----------( 253      ( 2018 08:23 )             35.8         134<L>  |  99  |  13  ----------------------------<  155<H>  4.6   |  23  |  1.0    Ca    8.5      2018 08:23    TPro  6.6  /  Alb  3.0<L>  /  TBili  0.5  /  DBili  0.2  /  AST  77<H>  /  ALT  53<H>  /  AlkPhos  213<H>

## 2018-04-25 NOTE — PROGRESS NOTE ADULT - SUBJECTIVE AND OBJECTIVE BOX
JADENRACHEL  82y  [facility] [place] [place] [address] [address]      Patient is a 82y old  Male who presents with a chief complaint of Diarrhea (21 Apr 2018 00:03)      INTERVAL HPI/OVERNIGHT EVENTS:    no acute events overnight     REVIEW OF SYSTEMS:  CONSTITUTIONAL: No fever, weight loss, or fatigue  EYES: No eye pain, visual disturbances, or discharge  ENMT:  No difficulty hearing, tinnitus, vertigo; left sided superficial throat pain  NECK: No pain or stiffness  BREASTS: No pain, masses, or nipple discharge  RESPIRATORY: No cough, wheezing, chills or hemoptysis; No shortness of breath  CARDIOVASCULAR: No chest pain, palpitations, dizziness, or leg swelling  GASTROINTESTINAL: No abdominal or epigastric pain. 2 bowel movements today , no blood appreciated , but described as red  GENITOURINARY: No dysuria, frequency, hematuria, or incontinence  NEUROLOGICAL: No headaches, memory loss, loss of strength, numbness, or tremors  SKIN: No itching, burning, rashes, or lesions   LYMPH NODES: No enlarged glands  ENDOCRINE: No heat or cold intolerance; No hair loss  MUSCULOSKELETAL: No joint pain or swelling;  PSYCHIATRIC: No depression, anxiety, mood swings, or difficulty sleeping  HEME/LYMPH: No easy bruising, or bleeding gums  ALLERY AND IMMUNOLOGIC: No hives or eczema comlaining of intermittent bilateral foot pain  FAMILY HISTORY:  No pertinent family history in first degree relatives    T(C): 36.7 (04-25-18 @ 12:32), Max: 36.7 (04-25-18 @ 12:32)  HR: 66 (04-25-18 @ 12:32) (55 - 66)  BP: 117/58 (04-25-18 @ 12:32) (117/58 - 145/67)  RR: 20 (04-25-18 @ 12:32) (18 - 20)  SpO2: --  Wt(kg): --Vital Signs Last 24 Hrs  T(C): 36.7 (25 Apr 2018 12:32), Max: 36.7 (25 Apr 2018 12:32)  T(F): 98.1 (25 Apr 2018 12:32), Max: 98.1 (25 Apr 2018 12:32)  HR: 66 (25 Apr 2018 12:32) (55 - 66)  BP: 117/58 (25 Apr 2018 12:32) (117/58 - 145/67)  BP(mean): --  RR: 20 (25 Apr 2018 12:32) (18 - 20)  SpO2: --    PHYSICAL EXAM:  GENERAL: NAD, well-groomed, well-developed  HEAD:  Atraumatic, Normocephalic  EYES: EOMI, PERRLA, conjunctiva and sclera clear  ENMT: No tonsillar erythema, exudates, or enlargement; Moist mucous membranes, Poor dentition, No lesions, tender left lower manidble but no lesions appreciated on examination  NECK: Supple, No JVD, Normal thyroid  NERVOUS SYSTEM:  Alert & Oriented X3, Good concentration; Motor Strength 5/5 B/L upper and lower extremities; DTRs 2+ intact and symmetric  CHEST/LUNG: Clear to auscultation bilaterally  HEART: Regular rate and rhythm; No murmurs, rubs, or gallops  ABDOMEN: Soft, Nontender, Nondistended; Bowel sounds present  EXTREMITIES:  2+ Peripheral Pulses, No clubbing, cyanosis, or edema  LYMPH: No lymphadenopathy noted  SKIN: No rashes or lesions    Consultant(s) Notes Reviewed:  [x ] YES  [ ] NO  Care Discussed with Consultants/Other Providers [ x] YES  [ ] NO    LABS:                            12.4   7.00  )-----------( 253      ( 24 Apr 2018 08:23 )             35.8   04-24    134<L>  |  99  |  13  ----------------------------<  155<H>  4.6   |  23  |  1.0    Ca    8.5      24 Apr 2018 08:23    TPro  6.6  /  Alb  3.0<L>  /  TBili  0.5  /  DBili  0.2  /  AST  77<H>  /  ALT  53<H>  /  AlkPhos  213<H>  04-24              Culture - Stool (collected 23 Apr 2018 08:43)  Source: .Stool Feces  Preliminary Report (25 Apr 2018 07:58):    No enteric gram negative rods isolated      acetaminophen   Tablet 650 milliGRAM(s) Oral every 6 hours PRN  cefTRIAXone   IVPB 2 Gram(s) IV Intermittent every 24 hours  metroNIDAZOLE  IVPB 500 milliGRAM(s) IV Intermittent every 8 hours  ondansetron Injectable 4 milliGRAM(s) IV Push every 6 hours PRN  pantoprazole    Tablet 40 milliGRAM(s) Oral before breakfast              Case Discussed with House Staff   45 minutes spent on total encounter; more than 50% of the visit was spent counseling and/or coordinating care by the attending physician.

## 2018-04-25 NOTE — PROGRESS NOTE ADULT - ASSESSMENT
81yo Syriac-speaking male with Past Medical History of DM and HTN admitted for nausea/vomiting and multiple episodes of diarrhea secondary to acute colitis.      Diarrhea secondary to acute colitis: C.diff PCR was negative.  WBC has normalized from admission (secondary to infectious colitis?).  Continue IV Rocephin and Flagyl.  Awaiting results from ELISEO, ASMA, AMA, ceruloplasmin, iron study, alpha 1 antitrypsin, immunoglobulin level, and SPEP.    COLONOSCOPY SCHEDULED FOR BABATUNDE PER GI NOTE  Liver Cirrhosis: rule out HCC.  Hepatitis B&C viral studies were negative.  Check AFP and CEA.  Pending MRI with liver protocol.  Acute Cystitis: see above for antibiotic management.  Acute kidney injury: serum creatinine has recovered from admission.  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

## 2018-04-26 ENCOUNTER — RESULT REVIEW (OUTPATIENT)
Age: 83
End: 2018-04-26

## 2018-04-26 LAB
A1AT SERPL-MCNC: 132 MG/DL — SIGNIFICANT CHANGE UP (ref 90–200)
ANION GAP SERPL CALC-SCNC: 11 MMOL/L — SIGNIFICANT CHANGE UP (ref 7–14)
BUN SERPL-MCNC: 9 MG/DL — LOW (ref 10–20)
CALCIUM SERPL-MCNC: 8.7 MG/DL — SIGNIFICANT CHANGE UP (ref 8.5–10.1)
CHLORIDE SERPL-SCNC: 100 MMOL/L — SIGNIFICANT CHANGE UP (ref 98–110)
CO2 SERPL-SCNC: 24 MMOL/L — SIGNIFICANT CHANGE UP (ref 17–32)
CREAT SERPL-MCNC: 1 MG/DL — SIGNIFICANT CHANGE UP (ref 0.7–1.5)
CULTURE RESULTS: SIGNIFICANT CHANGE UP
CULTURE RESULTS: SIGNIFICANT CHANGE UP
GLUCOSE SERPL-MCNC: 147 MG/DL — HIGH (ref 70–99)
HCT VFR BLD CALC: 37.3 % — LOW (ref 42–52)
HGB BLD-MCNC: 12.9 G/DL — LOW (ref 14–18)
IGA FLD-MCNC: 443 MG/DL — HIGH (ref 68–378)
IGG FLD-MCNC: 807 MG/DL — SIGNIFICANT CHANGE UP (ref 694–1618)
IGM SERPL-MCNC: 64 MG/DL — SIGNIFICANT CHANGE UP (ref 40–230)
KAPPA LC SER QL IFE: 2.89 MG/DL — HIGH (ref 0.33–1.94)
KAPPA/LAMBDA FREE LIGHT CHAIN RATIO, SERUM: 1.16 RATIO — SIGNIFICANT CHANGE UP (ref 0.26–1.65)
LAMBDA LC SER QL IFE: 2.5 MG/DL — SIGNIFICANT CHANGE UP (ref 0.57–2.63)
MCHC RBC-ENTMCNC: 31.8 PG — HIGH (ref 27–31)
MCHC RBC-ENTMCNC: 34.6 G/DL — SIGNIFICANT CHANGE UP (ref 32–37)
MCV RBC AUTO: 91.9 FL — SIGNIFICANT CHANGE UP (ref 80–94)
NRBC # BLD: 0 /100 WBCS — SIGNIFICANT CHANGE UP (ref 0–0)
PLATELET # BLD AUTO: 290 K/UL — SIGNIFICANT CHANGE UP (ref 130–400)
POTASSIUM SERPL-MCNC: 4.4 MMOL/L — SIGNIFICANT CHANGE UP (ref 3.5–5)
POTASSIUM SERPL-SCNC: 4.4 MMOL/L — SIGNIFICANT CHANGE UP (ref 3.5–5)
RBC # BLD: 4.06 M/UL — LOW (ref 4.7–6.1)
RBC # FLD: 14.7 % — HIGH (ref 11.5–14.5)
SODIUM SERPL-SCNC: 135 MMOL/L — SIGNIFICANT CHANGE UP (ref 135–146)
SPECIMEN SOURCE: SIGNIFICANT CHANGE UP
SPECIMEN SOURCE: SIGNIFICANT CHANGE UP
WBC # BLD: 6.91 K/UL — SIGNIFICANT CHANGE UP (ref 4.8–10.8)
WBC # FLD AUTO: 6.91 K/UL — SIGNIFICANT CHANGE UP (ref 4.8–10.8)

## 2018-04-26 RX ORDER — SOD SULF/SODIUM/NAHCO3/KCL/PEG
4000 SOLUTION, RECONSTITUTED, ORAL ORAL ONCE
Qty: 0 | Refills: 0 | Status: COMPLETED | OUTPATIENT
Start: 2018-04-26 | End: 2018-04-26

## 2018-04-26 RX ORDER — SOD SULF/SODIUM/NAHCO3/KCL/PEG
4000 SOLUTION, RECONSTITUTED, ORAL ORAL ONCE
Qty: 0 | Refills: 0 | Status: DISCONTINUED | OUTPATIENT
Start: 2018-04-26 | End: 2018-05-02

## 2018-04-26 RX ADMIN — Medication 100 MILLIGRAM(S): at 21:34

## 2018-04-26 RX ADMIN — Medication 100 MILLIGRAM(S): at 14:42

## 2018-04-26 RX ADMIN — Medication 4000 MILLILITER(S): at 21:45

## 2018-04-26 RX ADMIN — Medication 100 MILLIGRAM(S): at 05:52

## 2018-04-26 RX ADMIN — Medication 20 MILLIGRAM(S): at 21:45

## 2018-04-26 RX ADMIN — CEFTRIAXONE 100 GRAM(S): 500 INJECTION, POWDER, FOR SOLUTION INTRAMUSCULAR; INTRAVENOUS at 14:00

## 2018-04-26 NOTE — PROGRESS NOTE ADULT - ASSESSMENT
82M pmh of DM, HTN p/w 1 episode of vomiting and 8 episodes of diarrhea    #?sepsis with lacticemia  - No Diarrhea, no loose stools  - C diff was negative  - Continue on ceftriaxone and flagyl     # Diarrhea  - NPO overnight for colonscopy today  - s/p Dulcolax 15mg PO, Go lytely prep and dulcolax 15mg PO.   - Resolved  - C Diff and Stool cx negative    #Mass within the hepatic dome and imaging consistent with cirrhosis  - CEA, ELISEO, Anti-SM, and AFP negative.  - AMA, Cerruloplasmin, A1A received by lab and pending result  - CT shows evidence of cirrhosis, MRI done pending official read  - No ETOH abuse hx, pt/family unaware of dx  - Hepatitis panel negative  - U/S shows Nodular hepatic contour, consistent with cirrhosis  - LFTs normalizing, Alk phos still elevated. Will monitor    # JOSE with Electrolyte abnormalities  - Hyponatremia- stable  - Hyperkalemia- resolved  - JOSE- likely pre-renal, resolved    #Cystitis  - Ucx showed no growth although U/A showed 6-10 WBC's and few bacteria  - Continue Ceftriaxone    # DM  - Glucose mildly elevated last night; will continue to monitor  - c/w FS and insulin    # HTN  - BP stable  - Family will bring home meds to be started    GI/DVT PPX  From [place] 82M pmh of DM, HTN p/w 1 episode of vomiting and 8 episodes of diarrhea    #Mass within the hepatic dome and imaging consistent with cirrhosis  - MRI confirmed 3 liver lesions suggestive of hepatocellular carcinoma vs. Cholangiocarcinoma, 1 possible high grade dysplastic nodule and 1 dysplastic nodule vs vascular shunt  - Spoke with radiologist who rec biopsy for further follow up  - Will discuss with GI if IR consult needed for Biopsy  - CEA, ELISEO, Anti-SM, and AFP negative.  - AMA, Cerruloplasmin, A1A received by lab and pending result  - No ETOH abuse hx, pt/family unaware of dx  - Hepatitis panel negative  - Alk phos still elevated. Will monitor    #Colitis  - NPO overnight for colonoscopy today  - s/p Dulcolax 15mg PO, Go lytely prep and dulcolax 15mg PO.   - C diff negative  - Continue on ceftriaxone and flagyl     #Cystitis  - Ucx showed no growth although U/A showed 6-10 WBC's and few bacteria  - Continue Ceftriaxone    # Diarrhea  - Resolved  - C Diff and Stool cx negative    # JOSE with Electrolyte abnormalities  - Hyponatremia- stable  - Hyperkalemia- resolved  - JOSE- likely pre-renal, resolved    # DM  - Glucose mildly elevated last night; will continue to monitor  - c/w FS and insulin    # HTN  - BP stable  - Family will bring home meds to be started    GI/DVT PPX  From [place] 82M pmh of DM, HTN p/w 1 episode of vomiting and 8 episodes of diarrhea    #Mass within the hepatic dome and imaging consistent with cirrhosis  - MRI confirmed 3 liver lesions suggestive of hepatocellular carcinoma vs. Cholangiocarcinoma, 1 possible high grade dysplastic nodule and 1 dysplastic nodule vs vascular shunt  - Spoke with radiologist who rec biopsy for further follow up  - Will discuss with GI if IR consult needed for Biopsy  - CEA, ELISEO, Anti-SM, and AFP negative.  - AMA, Cerruloplasmin, A1A received by lab and pending result  - No ETOH abuse hx, pt/family unaware of dx  - Hepatitis panel negative  - Alk phos still elevated. Will monitor    #Colitis  - NPO overnight for colonoscopy today  - s/p Dulcolax 15mg PO, Go lytely prep and dulcolax 15mg PO.   - C diff negative  - Continue on ceftriaxone and flagyl     #Cystitis  - Dysuria improving  - Ucx showed no growth although U/A showed 6-10 WBC's and few bacteria  - Continue Ceftriaxone    # Diarrhea  - Resolved  - C Diff and Stool cx negative    # JOSE with Electrolyte abnormalities  - Hyponatremia- stable  - Hyperkalemia- resolved  - JOSE- likely pre-renal, resolved    # DM  - Glucose mildly elevated last night; will continue to monitor  - c/w FS and insulin    # HTN  - BP stable  - Family will bring home meds to be started    GI/DVT PPX  From [place]

## 2018-04-26 NOTE — PROGRESS NOTE ADULT - SUBJECTIVE AND OBJECTIVE BOX
JADENRACHEL  82y  [facility] [id number] [place] [address] [address]      Patient is a 82y old  Male who presents with a chief complaint of Diarrhea (21 Apr 2018 00:03)      INTERVAL HPI/OVERNIGHT EVENTS:  + Diarrhea clear bowel movement      REVIEW OF SYSTEMS:  CONSTITUTIONAL: No fever, weight loss, or fatigue  EYES: No eye pain, visual disturbances, or discharge  ENMT:  No difficulty hearing, tinnitus, vertigo; No sinus or throat pain  NECK: No pain or stiffness  BREASTS: No pain, masses, or nipple discharge  RESPIRATORY: No cough, wheezing, chills or hemoptysis; No shortness of breath  CARDIOVASCULAR: No chest pain, palpitations, dizziness, or leg swelling  GASTROINTESTINAL:+ Diarrhea  GENITOURINARY: No dysuria, frequency, hematuria, or incontinence  NEUROLOGICAL: No headaches, memory loss, loss of strength, numbness, or tremors  SKIN: No itching, burning, rashes, or lesions   LYMPH NODES: No enlarged glands  ENDOCRINE: No heat or cold intolerance; No hair loss  MUSCULOSKELETAL: No joint pain or swelling; No muscle, back, or extremity pain  PSYCHIATRIC: No depression, anxiety, mood swings, or difficulty sleeping  HEME/LYMPH: No easy bruising, or bleeding gums  ALLERY AND IMMUNOLOGIC: No hives or eczema  FAMILY HISTORY:  No pertinent family history in first degree relatives    T(C): 36.8 (04-26-18 @ 04:56), Max: 36.8 (04-25-18 @ 21:18)  HR: 59 (04-26-18 @ 04:56) (55 - 66)  BP: 137/63 (04-26-18 @ 04:56) (117/58 - 181/78)  RR: 20 (04-26-18 @ 04:56) (20 - 20)  SpO2: --  Wt(kg): --Vital Signs Last 24 Hrs  T(C): 36.8 (26 Apr 2018 04:56), Max: 36.8 (25 Apr 2018 21:18)  T(F): 98.2 (26 Apr 2018 04:56), Max: 98.3 (25 Apr 2018 21:18)  HR: 59 (26 Apr 2018 04:56) (55 - 66)  BP: 137/63 (26 Apr 2018 04:56) (117/58 - 181/78)  BP(mean): --  RR: 20 (26 Apr 2018 04:56) (20 - 20)  SpO2: --    PHYSICAL EXAM:  GENERAL: NAD, well-groomed, well-developed  HEAD:  Atraumatic, Normocephalic  EYES: EOMI, PERRLA, conjunctiva and sclera clear  ENMT: No tonsillar erythema, exudates, or enlargement; Moist mucous membranes, Good dentition, No lesions  NECK: Supple, No JVD, Normal thyroid  NERVOUS SYSTEM:  Alert & Oriented X3, Good concentration; Motor Strength 5/5 B/L upper and lower extremities; DTRs 2+ intact and symmetric  CHEST/LUNG: Clear to auscultation bilaterally  HEART: Regular rate and rhythm; No murmurs, rubs, or gallops  ABDOMEN: Soft, Nontender, Nondistended; Bowel sounds present  EXTREMITIES:  2+ Peripheral Pulses, No clubbing, cyanosis, or edema  LYMPH: No lymphadenopathy noted  SKIN: No rashes or lesions    Consultant(s) Notes Reviewed:  [x ] YES  [ ] NO  Care Discussed with Consultants/Other Providers [ x] YES  [ ] NO    < from: MR Abdomen w/ IV Cont (04.25.18 @ 17:59) >  Cirrhotic liver, with 3 distinct liver lesions are identified.    Peripherally enhancing hypovascular mass measuring 2.7 x 2.4 cm on series   901 image 21, at the right hepatic dome in segment 8. Differential     < end of copied text >  < from: MR Abdomen w/ IV Cont (04.25.18 @ 17:59) >  includes hypovascular hepatocellular carcinoma versus cholangiocarcinoma.    Mild T2 hyperintense mass measuring 2.0 x 1.9 cm on series 4 image 20 in   segment 3/4b.  It demonstrates heterogeneous arterial phase enhancement   within the mass without definite venous phase washout. The mass is   indeterminate at this time and could represent a high-grade dysplastic   nodule.    1.0 cm arterial enhancing focus in segment 7 on series 901 image 42   without definite portal venous base washout. Differential includes a   dysplastic nodule or less likely a vascular shunt.    A 3 month follow-up MRI with contrast is recommended.    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

## 2018-04-26 NOTE — PROGRESS NOTE ADULT - SUBJECTIVE AND OBJECTIVE BOX
SUBJECTIVE:    Patient is a 82y old Male who presents with a chief complaint of Diarrhea (21 Apr 2018 00:03)    Currently admitted to medicine with the primary diagnosis of Severe sepsis     Today is hospital day 6d. This morning he is resting comfortably in bed and reports no new issues or overnight events.     PAST MEDICAL & SURGICAL HISTORY  HTN (hypertension)  DM (diabetes mellitus)  UTI (urinary tract infection)  Depression  Cirrhosis  No significant past surgical history    SOCIAL HISTORY:  Negative for smoking/alcohol/drug use.     ALLERGIES:  No Known Allergies    MEDICATIONS:  STANDING MEDICATIONS  cefTRIAXone   IVPB 2 Gram(s) IV Intermittent every 24 hours  metroNIDAZOLE  IVPB 500 milliGRAM(s) IV Intermittent every 8 hours  pantoprazole    Tablet 40 milliGRAM(s) Oral before breakfast    PRN MEDICATIONS  acetaminophen   Tablet 650 milliGRAM(s) Oral every 6 hours PRN  bisacodyl 15 milliGRAM(s) Oral every 6 hours PRN  ondansetron Injectable 4 milliGRAM(s) IV Push every 6 hours PRN    VITALS:   T(F): 98.2  HR: 59  BP: 137/63  RR: 20  SpO2: --    LABS:                        12.4   7.00  )-----------( 253      ( 24 Apr 2018 08:23 )             35.8     04-24    134<L>  |  99  |  13  ----------------------------<  155<H>  4.6   |  23  |  1.0    Ca    8.5      24 Apr 2018 08:23    TPro  6.6  /  Alb  3.0<L>  /  TBili  0.5  /  DBili  0.2  /  AST  77<H>  /  ALT  53<H>  /  AlkPhos  213<H>  04-24    CEA - 4.9, AFP - 4.7, Anti-Sm - <1:20, Anti-Nuclear factor - negative    Culture - Stool (collected 23 Apr 2018 08:43)  Source: .Stool Feces  Preliminary Report (25 Apr 2018 07:58):    No enteric gram negative rods isolated    CAPILLARY BLOOD GLUCOSE  235 (25 Apr 2018 16:30)  128 (25 Apr 2018 08:05)      RADIOLOGY:        PHYSICAL EXAM:  GEN: No acute distress  HEENT: NC/AT, Tenderness to L submandibular region.   LUNGS: Clear to auscultation bilaterally   HEART: S1/S2 present. RRR.   ABD: Soft, non-tender, non-distended. Bowel sounds present  EXT: NC/NC/NE/2+PP/CLARKE  NEURO: AAOX3 SUBJECTIVE:    Patient is a 82y old Male who presents with a chief complaint of Diarrhea (21 Apr 2018 00:03)    Currently admitted to medicine with the primary diagnosis of Severe sepsis     Called by Lab with report of possible HCC on liver MRI which is near liver dome. Recommend biopsy if possible.      PAST MEDICAL & SURGICAL HISTORY  HTN (hypertension)  DM (diabetes mellitus)  UTI (urinary tract infection)  Depression  Cirrhosis  No significant past surgical history    SOCIAL HISTORY:  Negative for smoking/alcohol/drug use.     ALLERGIES:  No Known Allergies    MEDICATIONS:  STANDING MEDICATIONS  cefTRIAXone   IVPB 2 Gram(s) IV Intermittent every 24 hours  metroNIDAZOLE  IVPB 500 milliGRAM(s) IV Intermittent every 8 hours  pantoprazole    Tablet 40 milliGRAM(s) Oral before breakfast    PRN MEDICATIONS  acetaminophen   Tablet 650 milliGRAM(s) Oral every 6 hours PRN  bisacodyl 15 milliGRAM(s) Oral every 6 hours PRN  ondansetron Injectable 4 milliGRAM(s) IV Push every 6 hours PRN    VITALS:   T(F): 98.2  HR: 59  BP: 137/63  RR: 20  SpO2: --    LABS:                        12.4   7.00  )-----------( 253      ( 24 Apr 2018 08:23 )             35.8     04-24    134<L>  |  99  |  13  ----------------------------<  155<H>  4.6   |  23  |  1.0    Ca    8.5      24 Apr 2018 08:23    TPro  6.6  /  Alb  3.0<L>  /  TBili  0.5  /  DBili  0.2  /  AST  77<H>  /  ALT  53<H>  /  AlkPhos  213<H>  04-24    CEA - 4.9, AFP - 4.7, Anti-Sm - <1:20, Anti-Nuclear factor - negative    Culture - Stool (collected 23 Apr 2018 08:43)  Source: .Stool Feces  Preliminary Report (25 Apr 2018 07:58):    No enteric gram negative rods isolated    CAPILLARY BLOOD GLUCOSE  235 (25 Apr 2018 16:30)  128 (25 Apr 2018 08:05)      RADIOLOGY:  < from: MR Abdomen w/ IV Cont (04.25.18 @ 17:59) >  IMPRESSION:  Cirrhotic liver, with 3 distinct liver lesions are identified.    Peripherally enhancing hypovascular mass measuring 2.7 x 2.4 cm on series   901 image 21, at the right hepatic dome in segment 8. Differential   includes hypovascular hepatocellular carcinoma versus cholangiocarcinoma.    Mild T2 hyperintense mass measuring 2.0 x 1.9 cm on series 4 image 20 in   segment 3/4b.  It demonstrates heterogeneous arterial phase enhancement   within the mass without definite venous phase washout. The mass is   indeterminate at this time and could represent a high-grade dysplastic   nodule.    1.0 cm arterial enhancing focus in segment 7 on series 901 image 42   without definite portal venous base washout. Differential includes a   dysplastic nodule or less likely a vascular shunt.    A 3 month follow-up MRI with contrast is recommended.      < from: VA Duplex Lower Extrem Arterial, Bilat (04.25.18 @ 18:32) >    Impression:    Normal arterial flow in the bilateral lower extremities.    ICD-10: I70.213    < end of copied text >      < from: Xray Chest 1 View AP/PA (04.26.18 @ 08:33) >  Impression:      No radiographic evidence of acute cardiopulmonary disease.    < end of copied text >    PHYSICAL EXAM:  GEN: No acute distress  HEENT: NC/AT, Tenderness to L submandibular region.   LUNGS: Clear to auscultation bilaterally   HEART: S1/S2 present. RRR.   ABD: Soft, non-tender, non-distended. Bowel sounds present  EXT: NC/NC/NE/2+PP/CLARKE  NEURO: AAOX3 SUBJECTIVE:    Patient is a 82y old Male who presents with a chief complaint of Diarrhea (21 Apr 2018 00:03)    Currently admitted to medicine with the primary diagnosis of Severe sepsis     Called by Lab with report of possible HCC on liver MRI which is near liver dome. Recommend biopsy if possible.      Discussed findings with patient and son at bedside. Would like to investigate findings fully and if required would like aggressive therapy. Still complains of burning with urination. Pt also still mentions abdominal pain which is improving.     PAST MEDICAL & SURGICAL HISTORY  HTN (hypertension)  DM (diabetes mellitus)  UTI (urinary tract infection)  Depression  Cirrhosis  No significant past surgical history    SOCIAL HISTORY:  Negative for smoking/alcohol/drug use.     ALLERGIES:  No Known Allergies    MEDICATIONS:  STANDING MEDICATIONS  cefTRIAXone   IVPB 2 Gram(s) IV Intermittent every 24 hours  metroNIDAZOLE  IVPB 500 milliGRAM(s) IV Intermittent every 8 hours  pantoprazole    Tablet 40 milliGRAM(s) Oral before breakfast    PRN MEDICATIONS  acetaminophen   Tablet 650 milliGRAM(s) Oral every 6 hours PRN  bisacodyl 15 milliGRAM(s) Oral every 6 hours PRN  ondansetron Injectable 4 milliGRAM(s) IV Push every 6 hours PRN    VITALS:   T(F): 98.2  HR: 59  BP: 137/63  RR: 20  SpO2: --    LABS:                        12.4   7.00  )-----------( 253      ( 24 Apr 2018 08:23 )             35.8     04-24    134<L>  |  99  |  13  ----------------------------<  155<H>  4.6   |  23  |  1.0    Ca    8.5      24 Apr 2018 08:23    TPro  6.6  /  Alb  3.0<L>  /  TBili  0.5  /  DBili  0.2  /  AST  77<H>  /  ALT  53<H>  /  AlkPhos  213<H>  04-24    CEA - 4.9, AFP - 4.7, Anti-Sm - <1:20, Anti-Nuclear factor - negative    Culture - Stool (collected 23 Apr 2018 08:43)  Source: .Stool Feces  Preliminary Report (25 Apr 2018 07:58):    No enteric gram negative rods isolated    CAPILLARY BLOOD GLUCOSE  235 (25 Apr 2018 16:30)  128 (25 Apr 2018 08:05)      RADIOLOGY:  < from: MR Abdomen w/ IV Cont (04.25.18 @ 17:59) >  IMPRESSION:  Cirrhotic liver, with 3 distinct liver lesions are identified.    Peripherally enhancing hypovascular mass measuring 2.7 x 2.4 cm on series   901 image 21, at the right hepatic dome in segment 8. Differential   includes hypovascular hepatocellular carcinoma versus cholangiocarcinoma.    Mild T2 hyperintense mass measuring 2.0 x 1.9 cm on series 4 image 20 in   segment 3/4b.  It demonstrates heterogeneous arterial phase enhancement   within the mass without definite venous phase washout. The mass is   indeterminate at this time and could represent a high-grade dysplastic   nodule.    1.0 cm arterial enhancing focus in segment 7 on series 901 image 42   without definite portal venous base washout. Differential includes a   dysplastic nodule or less likely a vascular shunt.    A 3 month follow-up MRI with contrast is recommended.      < from: VA Duplex Lower Extrem Arterial, Bilat (04.25.18 @ 18:32) >    Impression:    Normal arterial flow in the bilateral lower extremities.    ICD-10: I70.213    < end of copied text >      < from: Xray Chest 1 View AP/PA (04.26.18 @ 08:33) >  Impression:      No radiographic evidence of acute cardiopulmonary disease.    < end of copied text >    PHYSICAL EXAM:  GEN: No acute distress  HEENT: NC/AT, Tenderness to L submandibular region.   LUNGS: Clear to auscultation bilaterally   HEART: S1/S2 present. RRR.   ABD: Soft mild tenderness to palpation to upper abdomen and suprapubic area, non-distended. Bowel sounds present  EXT: NC/NC/NE/2+PP/CLARKE  NEURO: AAOX3, no focal deficits

## 2018-04-26 NOTE — CHART NOTE - NSCHARTNOTEFT_GEN_A_CORE
PACU ANESTHESIA ADMISSION NOTE      Procedure:   Post op diagnosis:      ____  Intubated  TV:______       Rate: ______      FiO2: ______    _x___  Patent Airway    _x___  Full return of protective reflexes    _x___  Full recovery from anesthesia / back to baseline status    Vitals:  T(C): 36.9 (04-26-18 @ 17:40), Max: 36.9 (04-26-18 @ 16:54)  HR: 57 (04-26-18 @ 17:40) (55 - 69)  BP: 155/82 (04-26-18 @ 17:40) (127/65 - 181/78)  RR: 20 (04-26-18 @ 17:40) (20 - 20)  SpO2: --    Mental Status:  _x___ Awake   _____ Alert   _____ Drowsy   _____ Sedated    Nausea/Vomiting:  _x___  NO       ______Yes,   See Post - Op Orders         Pain Scale (0-10):  _____    Treatment: ____ None    __x__ See Post - Op/PCA Orders    Post - Operative Fluids:   ____ Oral   ___x_ See Post - Op Orders    Plan: Discharge:   ____Home       __x___Floor     _____Critical Care    _____  Other:_________________    Comments:  No anesthesia issues or complications noted.  Discharge when criteria met.

## 2018-04-26 NOTE — PROGRESS NOTE ADULT - ASSESSMENT
81yo Azeri-speaking male with Past Medical History of DM and HTN admitted for nausea/vomiting and multiple episodes of diarrhea secondary to acute colitis.      Diarrhea secondary to acute colitis: C.diff PCR was negative.  WBC has normalized from admission (secondary to infectious colitis?).  Continue IV Rocephin and Flagyl. COLONOSCOPY SCHEDULED FOR TODAY  Liver Cirrhosis: rule out HCC.  Hepatitis B&C viral studies were negative.  Check AFP and CEA.  MRI SHOWS SUSPICIOUS LESIONS , FOLLOW UP GI , AFP NEGATIVE ,HEPATITIS PANEL NEGATIVE   Acute Cystitis: ON ABX  Acute kidney injury: serum creatinine has recovered from admission.  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

## 2018-04-26 NOTE — CHART NOTE - NSCHARTNOTEFT_GEN_A_CORE
Anesthesia Post Op Assessment  		(    ) Intubated           TV _____	Rate _sv____	[id number]____  		(  x  ) Patent airway. Full return of protective reflexes  		(  x  )Full recovery from anesthesia/sedation to baseline status      Cardiovascular Function:  		BP:	133/69	                  Pulse:		60                  RR:		12                  Temp:		36.2                  O2Sat:     99      Mental Status:  	        (  x  ) awake		  (  x  ) alert		 (    ) drowsy	               (    ) sedated      Nausea/Vomiting:  		(    ) Yes, See post-op orders		   (   x ) No      Pain Scale: (0-10):			Treatment:     (  x  ) None	            (  x  ) See Post-Op/PCA Orders      Post-operative Fluids: 	   (    ) Oral	          (  x  ) See post-op Orders        Comments:    Uneventful. No complications from anesthesia.  Discharge when criteria met.

## 2018-04-27 ENCOUNTER — RESULT REVIEW (OUTPATIENT)
Age: 83
End: 2018-04-27

## 2018-04-27 LAB
ANION GAP SERPL CALC-SCNC: 13 MMOL/L — SIGNIFICANT CHANGE UP (ref 7–14)
BUN SERPL-MCNC: 10 MG/DL — SIGNIFICANT CHANGE UP (ref 10–20)
CALCIUM SERPL-MCNC: 8.3 MG/DL — LOW (ref 8.5–10.1)
CHLORIDE SERPL-SCNC: 101 MMOL/L — SIGNIFICANT CHANGE UP (ref 98–110)
CO2 SERPL-SCNC: 24 MMOL/L — SIGNIFICANT CHANGE UP (ref 17–32)
CREAT SERPL-MCNC: 0.9 MG/DL — SIGNIFICANT CHANGE UP (ref 0.7–1.5)
GLUCOSE SERPL-MCNC: 103 MG/DL — HIGH (ref 70–99)
HCT VFR BLD CALC: 34.8 % — LOW (ref 42–52)
HGB BLD-MCNC: 12 G/DL — LOW (ref 14–18)
MCHC RBC-ENTMCNC: 31.5 PG — HIGH (ref 27–31)
MCHC RBC-ENTMCNC: 34.5 G/DL — SIGNIFICANT CHANGE UP (ref 32–37)
MCV RBC AUTO: 91.3 FL — SIGNIFICANT CHANGE UP (ref 80–94)
NRBC # BLD: 0 /100 WBCS — SIGNIFICANT CHANGE UP (ref 0–0)
PLATELET # BLD AUTO: 271 K/UL — SIGNIFICANT CHANGE UP (ref 130–400)
POTASSIUM SERPL-MCNC: 4.2 MMOL/L — SIGNIFICANT CHANGE UP (ref 3.5–5)
POTASSIUM SERPL-SCNC: 4.2 MMOL/L — SIGNIFICANT CHANGE UP (ref 3.5–5)
RBC # BLD: 3.81 M/UL — LOW (ref 4.7–6.1)
RBC # FLD: 14.6 % — HIGH (ref 11.5–14.5)
SODIUM SERPL-SCNC: 138 MMOL/L — SIGNIFICANT CHANGE UP (ref 135–146)
WBC # BLD: 6.22 K/UL — SIGNIFICANT CHANGE UP (ref 4.8–10.8)
WBC # FLD AUTO: 6.22 K/UL — SIGNIFICANT CHANGE UP (ref 4.8–10.8)

## 2018-04-27 RX ADMIN — CEFTRIAXONE 100 GRAM(S): 500 INJECTION, POWDER, FOR SOLUTION INTRAMUSCULAR; INTRAVENOUS at 12:27

## 2018-04-27 RX ADMIN — Medication 100 MILLIGRAM(S): at 21:27

## 2018-04-27 RX ADMIN — Medication 100 MILLIGRAM(S): at 05:14

## 2018-04-27 NOTE — CONSULT NOTE ADULT - SUBJECTIVE AND OBJECTIVE BOX
HPI:  81 y/o Italian speaking male from [place] with a hx of DM, HTN was BIB by family  1 episode of vomiting and 8 episodes of diarrhea.    Per daughter at bedside, pt arrived approx. 2 weeks ago and has been eating at home. He suddenly developed diarrhea this AM with nasuea and an episode of vomiting. She states he has had diarrhea in [place] approx. 6 months apart and was told he had viral infection. He has also had UTI in the past. Presently, pt and family denied a known hx of cirrhosis, ETOH use, blood transfusions, known hx of hepatitis. Pt does attest to weight loss over a course of 1 year although he cannot quantify and also tells me he has poor appetite. He complains of mild dysuria and urinary urgency, but does empty his bladder. Since being admitted to , he did not report further vomiting, but does complain of nausea.   He denied fevers, chills, SOB, CP or weakness     PAST MEDICAL & SURGICAL HISTORY:  HTN (hypertension)  DM (diabetes mellitus)  UTI (urinary tract infection)  Depression  Cirrhosis  No significant past surgical history    FAMILY HISTORY:  No pertinent family history in first degree relatives    Allergies:  No Known Allergies    Social History:  Non smoker, No EtOH    MEDICATIONS  (STANDING):  cefTRIAXone   IVPB 2 Gram(s) IV Intermittent every 24 hours  metroNIDAZOLE  IVPB 500 milliGRAM(s) IV Intermittent every 8 hours  pantoprazole    Tablet 40 milliGRAM(s) Oral before breakfast  polyethylene glycol/electrolyte Solution. 4000 milliLiter(s) Oral once    MEDICATIONS  (PRN):  acetaminophen   Tablet 650 milliGRAM(s) Oral every 6 hours PRN For Temp greater than 38 C (100.4 F)  bisacodyl 15 milliGRAM(s) Oral every 6 hours PRN Constipation  ondansetron Injectable 4 milliGRAM(s) IV Push every 6 hours PRN Nausea and/or Vomiting    Labs:                        12.0   6.22  )-----------( 271      ( 27 Apr 2018 05:41 )             34.8             04-27    138  |  101  |  10  ----------------------------<  103<H>  4.2   |  24  |  0.9    Ca    8.3<L>      27 Apr 2018 05:41    < from: MR Abdomen w/ IV Cont (04.25.18 @ 17:59) >    EXAM:  MR ABDOMEN IC            PROCEDURE DATE:  04/25/2018            INTERPRETATION:  CLINICAL STATEMENT:  Liver mass on CT.    TECHNIQUE: Axial in- and out-of-phase T1-weighted; axial fat-saturated   T2-weighted; axial and coronal single-shot fast spin-echo T2-weighted;   axial diffusion/ADC; axial 2-D fiesta fat sat; and dynamic   Gadolinium-enhanced axial T1-weighted images were acquired.    COMPARISON:  Ultrasound of the abdomen and CT of the abdomen and pelvis   on 4/20/2018.    FINDINGS:    HEPATOBILIARY:  Cirrhotic liver. 3 distinct liver lesions are identified.    Peripherally enhancing hypovascular mass measuring 2.7 x 2.4 cm on series   901 image 21, at the right hepatic dome in segment 8.    Mild T2 hyperintense mass measuring 2.0 x 1.9 cm on series 4 image 20 in   segment 3/4b.  It demonstrates heterogeneous arterial phase enhancement   within the mass without definite venous phase washout.    1.0 cm arterial enhancing focus in segment 7 on series 901 image 42   without definite portal venous base washout.    The portal vein is patent.    Contracted gallbladder. No intrahepatic biliary ductal dilation. The   common bile duct measures 7 mm which is within normal limits for the   patient's age.    SPLEEN: No splenomegaly.    < end of copied text >    < from: MR Abdomen w/ IV Cont (04.25.18 @ 17:59) >  PANCREAS:  Unremarkable.    ADRENAL GLANDS:  Unremarkable.    KIDNEYS:  No hydronephrosis bilaterally. Tiny subcentimeter cysts   bilaterally.    ABDOMINAL NODES:  No adenopathy.    BONES/SOFT TISSUES:  Unremarkable.    OTHER:  Mild multilevel discogenic degenerative changes throughout the   spine. No aggressive bone lesions.    IMPRESSION:  Cirrhotic liver, with 3 distinct liver lesions are identified.    Peripherally enhancing hypovascular mass measuring 2.7 x 2.4 cm on series   901 image 21, at the right hepatic dome in segment 8. Differential   includes hypovascular hepatocellular carcinoma versus cholangiocarcinoma.    Mild T2 hyperintense mass measuring 2.0 x 1.9 cm on series 4 image 20 in   segment 3/4b.  It demonstrates heterogeneous arterial phase enhancement   within the mass without definite venous phase washout. The mass is   indeterminate at this time and could represent a high-grade dysplastic   nodule.    1.0 cm arterial enhancing focus in segment 7 on series 901 image 42   without definite portal venous base washout. Differential includes a   dysplastic nodule or less likely a vascular shunt.    A 3 month follow-up MRI with contrast is recommended.      < end of copied text >    < from: CT Abdomen and Pelvis w/ IV Cont (04.20.18 @ 18:48) >  IMPRESSION:    1. Colitis extending from the cecum to the splenic flexure. Differential   etiologiesinclude infectious, inflammatory, and ischemic processes.  2. Pericholecystic inflammation, likely secondary to the colitis.  3. Cirrhosis. Mass within the hepatic dome as above. Nonemergent MRI with   and without contrast is recommended to exclude hepatocellular carcinoma.  4. Evidence of mild pulmonary edema at the lung bases.    < end of copied text > HPI:  81 y/o Lithuanian speaking male from [place] with a hx of DM, HTN was BIB by family  1 episode of vomiting and 8 episodes of diarrhea.    Per daughter at bedside, pt arrived approx. 2 weeks ago and has been eating at home. He suddenly developed diarrhea this AM with nasuea and an episode of vomiting. She states he has had diarrhea in [place] approx. 6 months apart and was told he had viral infection. He has also had UTI in the past. Presently, pt and family denied a known hx of cirrhosis, ETOH use, blood transfusions, known hx of hepatitis. Pt does attest to weight loss over a course of 1 year although he cannot quantify and also tells me he has poor appetite. He complains of mild dysuria and urinary urgency, but does empty his bladder. Since being admitted to , he did not report further vomiting, but does complain of nausea.   He denied fevers, chills, SOB, CP or weakness     PAST MEDICAL & SURGICAL HISTORY:  HTN (hypertension)  DM (diabetes mellitus)  UTI (urinary tract infection)  Depression  Cirrhosis  No significant past surgical history    FAMILY HISTORY:  No pertinent family history in first degree relatives    Allergies:  No Known Allergies    Social History:  Non smoker, No EtOH    MEDICATIONS  (STANDING):  cefTRIAXone   IVPB 2 Gram(s) IV Intermittent every 24 hours  metroNIDAZOLE  IVPB 500 milliGRAM(s) IV Intermittent every 8 hours  pantoprazole    Tablet 40 milliGRAM(s) Oral before breakfast  polyethylene glycol/electrolyte Solution. 4000 milliLiter(s) Oral once    MEDICATIONS  (PRN):  acetaminophen   Tablet 650 milliGRAM(s) Oral every 6 hours PRN For Temp greater than 38 C (100.4 F)  bisacodyl 15 milliGRAM(s) Oral every 6 hours PRN Constipation  ondansetron Injectable 4 milliGRAM(s) IV Push every 6 hours PRN Nausea and/or Vomiting    Vital Signs Last 24 Hrs  T(C): 36.9 (27 Apr 2018 13:13), Max: 36.9 (26 Apr 2018 16:54)  T(F): 98.4 (27 Apr 2018 13:03), Max: 98.5 (26 Apr 2018 16:54)  HR: 57 (27 Apr 2018 14:29) (52 - 62)  BP: 165/88 (27 Apr 2018 14:29) (130/70 - 178/76)  BP(mean): --  RR: 17 (27 Apr 2018 14:29) (17 - 20)  SpO2: 98% (27 Apr 2018 14:29) (98% - 99%)      Labs:                        12.0   6.22  )-----------( 271      ( 27 Apr 2018 05:41 )             34.8             04-27    138  |  101  |  10  ----------------------------<  103<H>  4.2   |  24  |  0.9    Ca    8.3<L>      27 Apr 2018 05:41    Hepatitis C Antibody Test (04.21.18 @ 08:43)    Hepatitis C Virus S/CO Ratio: 0.33 S/CO    Hepatitis C Virus Interpretation: Nonreact: Hepatitis C AB  S/CO Ratio                        Interpretation  < 1.0                                     Non-Reactive  1.0 - 4.9                           Weakly-Reactive  > 5.0                                 Reactive  Non-Reactive: A person witha non-reactive HCV antibody result is  considered uninfected.  No further action is needed unless recent  infection is suspected.  In these cases, consider repeat testing later to  detect seroconversion..  Weakly-Reactive: HCV antibody test is abnormal, HCV RNA Qualitative test  will follow.  Reactive: HCV antibody test is abnormal, HCV RNA Qualitative test will  follow.  Note: HCV antibody testing is performed on the Abbott  system.        < from: MR Abdomen w/ IV Cont (04.25.18 @ 17:59) >    EXAM:  MR ABDOMEN IC        Hepatitis B Surface Antigen (04.21.18 @ 08:43)    Hepatitis B Surface Antigen: Nonreact    Hepatitis B Surface Antibody (04.21.18 @ 08:43)    Hepatitis B Surface Antibody: Nonreact    Hepatitis B Core Antibody, Total (04.21.18 @ 08:43)    Hepatitis B Core Antibody, Total: Nonreact            PROCEDURE DATE:  04/25/2018            INTERPRETATION:  CLINICAL STATEMENT:  Liver mass on CT.    TECHNIQUE: Axial in- and out-of-phase T1-weighted; axial fat-saturated   T2-weighted; axial and coronal single-shot fast spin-echo T2-weighted;   axial diffusion/ADC; axial 2-D fiesta fat sat; and dynamic   Gadolinium-enhanced axial T1-weighted images were acquired.    COMPARISON:  Ultrasound of the abdomen and CT of the abdomen and pelvis   on 4/20/2018.    FINDINGS:    HEPATOBILIARY:  Cirrhotic liver. 3 distinct liver lesions are identified.    Peripherally enhancing hypovascular mass measuring 2.7 x 2.4 cm on series   901 image 21, at the right hepatic dome in segment 8.    Mild T2 hyperintense mass measuring 2.0 x 1.9 cm on series 4 image 20 in   segment 3/4b.  It demonstrates heterogeneous arterial phase enhancement   within the mass without definite venous phase washout.    1.0 cm arterial enhancing focus in segment 7 on series 901 image 42   without definite portal venous base washout.    The portal vein is patent.    Contracted gallbladder. No intrahepatic biliary ductal dilation. The   common bile duct measures 7 mm which is within normal limits for the   patient's age.    SPLEEN: No splenomegaly.    < end of copied text >    < from: MR Abdomen w/ IV Cont (04.25.18 @ 17:59) >  PANCREAS:  Unremarkable.    ADRENAL GLANDS:  Unremarkable.    KIDNEYS:  No hydronephrosis bilaterally. Tiny subcentimeter cysts   bilaterally.    ABDOMINAL NODES:  No adenopathy.    BONES/SOFT TISSUES:  Unremarkable.    OTHER:  Mild multilevel discogenic degenerative changes throughout the   spine. No aggressive bone lesions.    IMPRESSION:  Cirrhotic liver, with 3 distinct liver lesions are identified.    Peripherally enhancing hypovascular mass measuring 2.7 x 2.4 cm on series   901 image 21, at the right hepatic dome in segment 8. Differential   includes hypovascular hepatocellular carcinoma versus cholangiocarcinoma.    Mild T2 hyperintense mass measuring 2.0 x 1.9 cm on series 4 image 20 in   segment 3/4b.  It demonstrates heterogeneous arterial phase enhancement   within the mass without definite venous phase washout. The mass is   indeterminate at this time and could represent a high-grade dysplastic   nodule.    1.0 cm arterial enhancing focus in segment 7 on series 901 image 42   without definite portal venous base washout. Differential includes a   dysplastic nodule or less likely a vascular shunt.    A 3 month follow-up MRI with contrast is recommended.      < end of copied text >    < from: CT Abdomen and Pelvis w/ IV Cont (04.20.18 @ 18:48) >  IMPRESSION:    1. Colitis extending from the cecum to the splenic flexure. Differential   etiologiesinclude infectious, inflammatory, and ischemic processes.  2. Pericholecystic inflammation, likely secondary to the colitis.  3. Cirrhosis. Mass within the hepatic dome as above. Nonemergent MRI with   and without contrast is recommended to exclude hepatocellular carcinoma.  4. Evidence of mild pulmonary edema at the lung bases.    < end of copied text > HPI:  83 y/o Telugu speaking male from [place] with a hx of DM, HTN was BIB by family  1 episode of vomiting and 8 episodes of diarrhea.    Per daughter at bedside, pt arrived approx. 2 weeks ago and has been eating at home. He suddenly developed diarrhea this AM with nasuea and an episode of vomiting. She states he has had diarrhea in [place] approx. 6 months apart and was told he had viral infection. He has also had UTI in the past. Presently, pt and family denied a known hx of cirrhosis, ETOH use, blood transfusions, known hx of hepatitis. Pt does attest to weight loss over a course of 1 year although he cannot quantify and also tells me he has poor appetite. He complains of mild dysuria and urinary urgency, but does empty his bladder. Since being admitted to , he did not report further vomiting, but does complain of nausea.   He denied fevers, chills, SOB, CP or weakness.    Pt's diarrhea is now improved. He had colonoscopy done today. No pain. No other complaints.     PAST MEDICAL & SURGICAL HISTORY:  HTN (hypertension)  DM (diabetes mellitus)  UTI (urinary tract infection)  Depression  Cirrhosis  No significant past surgical history    FAMILY HISTORY:  No pertinent family history in first degree relatives    Allergies:  No Known Allergies    Social History:  Non smoker, No EtOH    MEDICATIONS  (STANDING):  cefTRIAXone   IVPB 2 Gram(s) IV Intermittent every 24 hours  metroNIDAZOLE  IVPB 500 milliGRAM(s) IV Intermittent every 8 hours  pantoprazole    Tablet 40 milliGRAM(s) Oral before breakfast  polyethylene glycol/electrolyte Solution. 4000 milliLiter(s) Oral once    MEDICATIONS  (PRN):  acetaminophen   Tablet 650 milliGRAM(s) Oral every 6 hours PRN For Temp greater than 38 C (100.4 F)  bisacodyl 15 milliGRAM(s) Oral every 6 hours PRN Constipation  ondansetron Injectable 4 milliGRAM(s) IV Push every 6 hours PRN Nausea and/or Vomiting    Vital Signs Last 24 Hrs  T(C): 36.9 (27 Apr 2018 13:13), Max: 36.9 (26 Apr 2018 16:54)  T(F): 98.4 (27 Apr 2018 13:03), Max: 98.5 (26 Apr 2018 16:54)  HR: 57 (27 Apr 2018 14:29) (52 - 62)  BP: 165/88 (27 Apr 2018 14:29) (130/70 - 178/76)  BP(mean): --  RR: 17 (27 Apr 2018 14:29) (17 - 20)  SpO2: 98% (27 Apr 2018 14:29) (98% - 99%)      Labs:                        12.0   6.22  )-----------( 271      ( 27 Apr 2018 05:41 )             34.8             04-27    138  |  101  |  10  ----------------------------<  103<H>  4.2   |  24  |  0.9    Ca    8.3<L>      27 Apr 2018 05:41    Hepatitis C Antibody Test (04.21.18 @ 08:43)    Hepatitis C Virus S/CO Ratio: 0.33 S/CO    Hepatitis C Virus Interpretation: Nonreact: Hepatitis C AB  S/CO Ratio                        Interpretation  < 1.0                                     Non-Reactive  1.0 - 4.9                           Weakly-Reactive  > 5.0                                 Reactive  Non-Reactive: A person witha non-reactive HCV antibody result is  considered uninfected.  No further action is needed unless recent  infection is suspected.  In these cases, consider repeat testing later to  detect seroconversion..  Weakly-Reactive: HCV antibody test is abnormal, HCV RNA Qualitative test  will follow.  Reactive: HCV antibody test is abnormal, HCV RNA Qualitative test will  follow.  Note: HCV antibody testing is performed on the Abbott  system.        < from: MR Abdomen w/ IV Cont (04.25.18 @ 17:59) >    EXAM:  MR ABDOMEN IC        Hepatitis B Surface Antigen (04.21.18 @ 08:43)    Hepatitis B Surface Antigen: Nonreact    Hepatitis B Surface Antibody (04.21.18 @ 08:43)    Hepatitis B Surface Antibody: Nonreact    Hepatitis B Core Antibody, Total (04.21.18 @ 08:43)    Hepatitis B Core Antibody, Total: Nonreact            PROCEDURE DATE:  04/25/2018            INTERPRETATION:  CLINICAL STATEMENT:  Liver mass on CT.    TECHNIQUE: Axial in- and out-of-phase T1-weighted; axial fat-saturated   T2-weighted; axial and coronal single-shot fast spin-echo T2-weighted;   axial diffusion/ADC; axial 2-D fiesta fat sat; and dynamic   Gadolinium-enhanced axial T1-weighted images were acquired.    COMPARISON:  Ultrasound of the abdomen and CT of the abdomen and pelvis   on 4/20/2018.    FINDINGS:    HEPATOBILIARY:  Cirrhotic liver. 3 distinct liver lesions are identified.    Peripherally enhancing hypovascular mass measuring 2.7 x 2.4 cm on series   901 image 21, at the right hepatic dome in segment 8.    Mild T2 hyperintense mass measuring 2.0 x 1.9 cm on series 4 image 20 in   segment 3/4b.  It demonstrates heterogeneous arterial phase enhancement   within the mass without definite venous phase washout.    1.0 cm arterial enhancing focus in segment 7 on series 901 image 42   without definite portal venous base washout.    The portal vein is patent.    Contracted gallbladder. No intrahepatic biliary ductal dilation. The   common bile duct measures 7 mm which is within normal limits for the   patient's age.    SPLEEN: No splenomegaly.    < end of copied text >    < from: MR Abdomen w/ IV Cont (04.25.18 @ 17:59) >  PANCREAS:  Unremarkable.    ADRENAL GLANDS:  Unremarkable.    KIDNEYS:  No hydronephrosis bilaterally. Tiny subcentimeter cysts   bilaterally.    ABDOMINAL NODES:  No adenopathy.    BONES/SOFT TISSUES:  Unremarkable.    OTHER:  Mild multilevel discogenic degenerative changes throughout the   spine. No aggressive bone lesions.    IMPRESSION:  Cirrhotic liver, with 3 distinct liver lesions are identified.    Peripherally enhancing hypovascular mass measuring 2.7 x 2.4 cm on series   901 image 21, at the right hepatic dome in segment 8. Differential   includes hypovascular hepatocellular carcinoma versus cholangiocarcinoma.    Mild T2 hyperintense mass measuring 2.0 x 1.9 cm on series 4 image 20 in   segment 3/4b.  It demonstrates heterogeneous arterial phase enhancement   within the mass without definite venous phase washout. The mass is   indeterminate at this time and could represent a high-grade dysplastic   nodule.    1.0 cm arterial enhancing focus in segment 7 on series 901 image 42   without definite portal venous base washout. Differential includes a   dysplastic nodule or less likely a vascular shunt.    A 3 month follow-up MRI with contrast is recommended.      < end of copied text >    < from: CT Abdomen and Pelvis w/ IV Cont (04.20.18 @ 18:48) >  IMPRESSION:    1. Colitis extending from the cecum to the splenic flexure. Differential   etiologiesinclude infectious, inflammatory, and ischemic processes.  2. Pericholecystic inflammation, likely secondary to the colitis.  3. Cirrhosis. Mass within the hepatic dome as above. Nonemergent MRI with   and without contrast is recommended to exclude hepatocellular carcinoma.  4. Evidence of mild pulmonary edema at the lung bases.    < end of copied text >

## 2018-04-27 NOTE — PRE-ANESTHESIA EVALUATION ADULT - NSANTHOSAYNRD_GEN_A_CORE
No. FAISAL screening performed.  STOP BANG Legend: 0-2 = LOW Risk; 3-4 = INTERMEDIATE Risk; 5-8 = HIGH Risk/not done
not done/No. FAISAL screening performed.  STOP BANG Legend: 0-2 = LOW Risk; 3-4 = INTERMEDIATE Risk; 5-8 = HIGH Risk

## 2018-04-27 NOTE — PROGRESS NOTE ADULT - SUBJECTIVE AND OBJECTIVE BOX
SUBJECTIVE:    Patient is a 82y old Male who presents with a chief complaint of Diarrhea (21 Apr 2018 00:03)    Currently admitted to medicine with the primary diagnosis of Severe sepsis     Today is hospital day 7d. Pt taken for colonoscopy yesterday but prep was not adequate; required additional Go lytely last night      PAST MEDICAL & SURGICAL HISTORY  HTN (hypertension)  DM (diabetes mellitus)  UTI (urinary tract infection)  Depression  Cirrhosis  No significant past surgical history    SOCIAL HISTORY:  Negative for smoking/alcohol/drug use.     ALLERGIES:  No Known Allergies    MEDICATIONS:  STANDING MEDICATIONS  cefTRIAXone   IVPB 2 Gram(s) IV Intermittent every 24 hours  metroNIDAZOLE  IVPB 500 milliGRAM(s) IV Intermittent every 8 hours  pantoprazole    Tablet 40 milliGRAM(s) Oral before breakfast  polyethylene glycol/electrolyte Solution. 4000 milliLiter(s) Oral once    PRN MEDICATIONS  acetaminophen   Tablet 650 milliGRAM(s) Oral every 6 hours PRN  bisacodyl 15 milliGRAM(s) Oral every 6 hours PRN  ondansetron Injectable 4 milliGRAM(s) IV Push every 6 hours PRN    VITALS:   T(F): 98.4  HR: 57  BP: 165/88  RR: 17  SpO2: 98%    LABS:                        12.0   6.22  )-----------( 271      ( 27 Apr 2018 05:41 )             34.8     04-27    138  |  101  |  10  ----------------------------<  103<H>  4.2   |  24  |  0.9    Ca    8.3<L>      27 Apr 2018 05:41      RADIOLOGY:    < from: Xray Chest 1 View AP/PA (04.26.18 @ 08:33) >  Impression:      No radiographic evidence of acute cardiopulmonary disease.    < end of copied text >      PHYSICAL EXAM:  GEN: No acute distress  HEENT: NC/AT  LUNGS: Clear to auscultation bilaterally   HEART: S1/S2 present. RRR.   ABD: Soft, mild TTP non-distended. Bowel sounds present  EXT: NC/NC/NE/2+PP/CLARKE  NEURO: AAOX3

## 2018-04-27 NOTE — PROGRESS NOTE ADULT - ASSESSMENT
82M pmh of DM, HTN p/w 1 episode of vomiting and 8 episodes of diarrhea    #Mass within the hepatic dome and imaging consistent with cirrhosis  - Heme/Onc eval appreciated; IR consulted for biopsy of liver lesion. CT chest IV contrast for staging ordered. Will check CA 19-9  - MRI confirmed 3 liver lesions suggestive of hepatocellular carcinoma vs. Cholangiocarcinoma, 1 possible high grade dysplastic nodule and 1 dysplastic nodule vs vascular shunt  - CEA, ELISEO, Anti-SM, and AFP negative.  - AMA, Cerruloplasmin, A1A received by lab and pending result  - No ETOH abuse hx, pt/family unaware of dx  - Hepatitis panel negative  - Alk phos still elevated. Will monitor    #Colitis  - Pt s/p colonoscopy today which showed mild patchy areas of inflammation and erosions seen in the ascending colon, moderate in transverse colon and mild in descending colon. Two biopsies taken. Mild diverticulosis found in sigmoid colon, internal hemorrhoids   - Per GI recs, resume regular diet as tolerated; f/u results of biopsy specimens. Vascular Eval? Oncology eval for hepatic lesions, consider IR eval for biopsy of liver lesions.  - C diff negative  - Will continue on ceftriaxone and flagyl     #Cystitis  - Dysuria improving  - Ucx showed no growth although U/A showed 6-10 WBC's and few bacteria  - Continue Ceftriaxone    # Diarrhea  - Resolved  - C Diff and Stool cx negative    # JOSE with Electrolyte abnormalities  - Hyponatremia- stable  - Hyperkalemia- resolved  - JOSE- likely pre-renal, resolved    # DM  - Glucose mildly elevated last night; will continue to monitor  - c/w FS and insulin    # HTN  - BP stable  - Family will bring home meds to be started    #Anemia  - f/u Vit B12 and Folic Acid  GI/DVT PPX  From [place]

## 2018-04-27 NOTE — PROGRESS NOTE ADULT - SUBJECTIVE AND OBJECTIVE BOX
JADEN RACHEL  82y  [facility] [place] [address] A      Patient is a 82y old  Male who presents with a chief complaint of Diarrhea (21 Apr 2018 00:03)      INTERVAL HPI/OVERNIGHT EVENTS:    unable to perform full colonoscopy yesterday  because of inadequate prep , re prepped , and scheduleed today    REVIEW OF SYSTEMS:  CONSTITUTIONAL: No fever, weight loss, or fatigue  EYES: No eye pain, visual disturbances, or discharge  ENMT:  No difficulty hearing, tinnitus, vertigo; No sinus or throat pain  NECK: No pain or stiffness  BREASTS: No pain, masses, or nipple discharge  RESPIRATORY: No cough, wheezing, chills or hemoptysis; No shortness of breath  CARDIOVASCULAR: No chest pain, palpitations, dizziness, or leg swelling  GASTROINTESTINAL:+ Diarrhea   GENITOURINARY: No dysuria, frequency, hematuria, or incontinence  NEUROLOGICAL: No headaches, memory loss, loss of strength, numbness, or tremors  SKIN: No itching, burning, rashes, or lesions   LYMPH NODES: No enlarged glands  ENDOCRINE: No heat or cold intolerance; No hair loss  MUSCULOSKELETAL: No joint pain or swelling; No muscle, back, or extremity pain  PSYCHIATRIC: No depression, anxiety, mood swings, or difficulty sleeping  HEME/LYMPH: No easy bruising, or bleeding gums  ALLERY AND IMMUNOLOGIC: No hives or eczema  FAMILY HISTORY:  No pertinent family history in first degree relatives    T(C): 36.6 (04-26-18 @ 21:53), Max: 36.9 (04-26-18 @ 16:54)  HR: 57 (04-26-18 @ 21:53) (52 - 69)  BP: 165/74 (04-26-18 @ 21:53) (127/65 - 178/76)  RR: 18 (04-26-18 @ 21:53) (18 - 20)  SpO2: --  Wt(kg): --Vital Signs Last 24 Hrs  T(C): 36.6 (26 Apr 2018 21:53), Max: 36.9 (26 Apr 2018 16:54)  T(F): 97.8 (26 Apr 2018 21:53), Max: 98.5 (26 Apr 2018 16:54)  HR: 57 (26 Apr 2018 21:53) (52 - 69)  BP: 165/74 (26 Apr 2018 21:53) (127/65 - 178/76)  BP(mean): --  RR: 18 (26 Apr 2018 21:53) (18 - 20)  SpO2: --    PHYSICAL EXAM:  GENERAL: NAD, well-groomed, well-developed  HEAD:  Atraumatic, Normocephalic  EYES: EOMI, PERRLA, conjunctiva and sclera clear  ENMT: No tonsillar erythema, exudates, or enlargement; Moist mucous membranes, Good dentition, No lesions  NECK: Supple, No JVD, Normal thyroid  NERVOUS SYSTEM:  Alert & Oriented X3, Good concentration; Motor Strength 5/5 B/L upper and lower extremities; DTRs 2+ intact and symmetric  CHEST/LUNG: Clear to auscultation bilaterally  HEART: Regular rate and rhythm; No murmurs, rubs, or gallops  ABDOMEN: Soft, Nontender, Nondistended; Bowel sounds present  EXTREMITIES:  2+ Peripheral Pulses, No clubbing, cyanosis, or edema  LYMPH: No lymphadenopathy noted  SKIN: No rashes or lesions    Consultant(s) Notes Reviewed:  [x ] YES  [ ] NO  Care Discussed with Consultants/Other Providers [ x] YES  [ ] NO    LABS:                            12.0   6.22  )-----------( 271      ( 27 Apr 2018 05:41 )             34.8   04-27    138  |  101  |  10  ----------------------------<  103<H>  4.2   |  24  |  0.9    Ca    8.3<L>      27 Apr 2018 05:41              acetaminophen   Tablet 650 milliGRAM(s) Oral every 6 hours PRN  bisacodyl 15 milliGRAM(s) Oral every 6 hours PRN  cefTRIAXone   IVPB 2 Gram(s) IV Intermittent every 24 hours  metroNIDAZOLE  IVPB 500 milliGRAM(s) IV Intermittent every 8 hours  ondansetron Injectable 4 milliGRAM(s) IV Push every 6 hours PRN  pantoprazole    Tablet 40 milliGRAM(s) Oral before breakfast  polyethylene glycol/electrolyte Solution. 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

## 2018-04-27 NOTE — CONSULT NOTE ADULT - ATTENDING COMMENTS
Pt seen and examined.  Agree with above A/P.  Arrange IR guided biopsy of liver lesions.  Further recommendations will follow.

## 2018-04-27 NOTE — CONSULT NOTE ADULT - ASSESSMENT
This is an 81 y/o M from [place] with DM, HTN, undiagnosed cirrhosis, DLD who p/w colitis complicated by sepsis and was found to have multiple hepatic lesions suspicious for HCC vs cholangiocarcinoma associated with cirrhosis both on MR and CT scan of abdomen. He had colonoscopy which did not show any suspicious lesions.    1. Liver lesions: Unclear etiology. HCC vs cholangiocarcinoma vs mets from upper GI tract. Recommend liver biopsy. Get CT chest with contrast to determine the extent of the disease. Check CA 19-9.    2. Anemia: Mild. Check FA, vit B12.    3. Continue with meds for other co-morbidities.    4. DVT/GI ppx

## 2018-04-27 NOTE — PROGRESS NOTE ADULT - ASSESSMENT
83yo Tamaz[place]-speaking male with Past Medical History of DM and HTN admitted for nausea/vomiting and multiple episodes of diarrhea secondary to acute colitis.      Diarrhea secondary to acute colitis: C.diff PCR was negative  Continue IV Rocephin and Flagyl. colonoscopy to be reperformed today   Liver Cirrhosis: rule out HCC.  Hepatitis B&C viral studies were negative.  Check AFP and CEA.  MRI SHOWS SUSPICIOUS LESIONS , FOLLOW UP GI , AFP NEGATIVE ,HEPATITIS PANEL NEGATIVE ,  oncology consult   Acute kidney injury: serum creatinine has recovered from admission.  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

## 2018-04-27 NOTE — CHART NOTE - NSCHARTNOTEFT_GEN_A_CORE
Registered Dietitian At Risk Follow-Up     Pertinent Subjective Information: Pt was not in the room for both RD visits as pt was getting colonoscopy per RN. However pt was tolerating clear liquid diet well with no issues reported.      Pertinent Medical Interventions: Unable to perform full colonoscopy yesterday  because of inadequate prep , re-prepped , and scheduled today.    Diet order: NPO after midnight      Anthropometrics:  - Ht. 64"  - Wt. 145#--wt change likely 2/2 bed scale inaccuracy     Pertinent Lab Data: Reviewed (4/27): Glu 103    Pertinent Meds: flagyl, dulcolax, zofran, protonix      Physical Findings:  - Appearance: pt not in room upon both RD visits   - GI function: no GI distress noted per RN  - Oral/Mouth cavity: denies difficulty swallowing/chewing  - Skin: Silvano 21--skin intact      Nutrition Requirements  Weight Used: ABW: 148#/67kg    Estimated Calorie Needs: 8415-9203 kcal/day (MSJ x 1.2-1.3 AF)--remains as of 4/24     Estimated Protein Needs: 67-80 gm/day (1-1.2 gm/kg ABW)--remains as of 4/24     Estimated Fluid Needs: 1 ml/kcal     Nutrient Intake: not meeting pro/kcal needs at this time      Previous Nutrition Diagnosis: Inadequate protein-energy intake (ongoing)     Nutrition Intervention: meals and snacks    Recommendations:  1. When medically appropriate, advance diet to GI Soft     Goal/Expected Outcome: Nutrition goals in 4 days: pt's diet to be advanced and pt to consume >50% of meals and snacks    Indicator/Monitoring: RD to monitor diet order, energy intake, nutrition focused physical findings

## 2018-04-28 LAB
ANION GAP SERPL CALC-SCNC: 10 MMOL/L — SIGNIFICANT CHANGE UP (ref 7–14)
BUN SERPL-MCNC: 14 MG/DL — SIGNIFICANT CHANGE UP (ref 10–20)
CALCIUM SERPL-MCNC: 8.2 MG/DL — LOW (ref 8.5–10.1)
CHLORIDE SERPL-SCNC: 105 MMOL/L — SIGNIFICANT CHANGE UP (ref 98–110)
CO2 SERPL-SCNC: 23 MMOL/L — SIGNIFICANT CHANGE UP (ref 17–32)
CREAT SERPL-MCNC: 1.1 MG/DL — SIGNIFICANT CHANGE UP (ref 0.7–1.5)
ESTIMATED AVERAGE GLUCOSE: 186 MG/DL — HIGH (ref 68–114)
GLUCOSE SERPL-MCNC: 214 MG/DL — HIGH (ref 70–99)
HBA1C BLD-MCNC: 8.1 % — HIGH (ref 4–5.6)
HCT VFR BLD CALC: 34.6 % — LOW (ref 42–52)
HCT VFR BLD CALC: 36 % — LOW (ref 39–50)
HGB BLD-MCNC: 11.9 G/DL — LOW (ref 14–18)
MCHC RBC-ENTMCNC: 31.6 PG — HIGH (ref 27–31)
MCHC RBC-ENTMCNC: 34.4 G/DL — SIGNIFICANT CHANGE UP (ref 32–37)
MCV RBC AUTO: 91.8 FL — SIGNIFICANT CHANGE UP (ref 80–94)
NRBC # BLD: 0 /100 WBCS — SIGNIFICANT CHANGE UP (ref 0–0)
PLATELET # BLD AUTO: 284 K/UL — SIGNIFICANT CHANGE UP (ref 130–400)
POTASSIUM SERPL-MCNC: 4 MMOL/L — SIGNIFICANT CHANGE UP (ref 3.5–5)
POTASSIUM SERPL-SCNC: 4 MMOL/L — SIGNIFICANT CHANGE UP (ref 3.5–5)
RBC # BLD: 3.77 M/UL — LOW (ref 4.7–6.1)
RBC # FLD: 14.6 % — HIGH (ref 11.5–14.5)
SODIUM SERPL-SCNC: 138 MMOL/L — SIGNIFICANT CHANGE UP (ref 135–146)
VIT B12 SERPL-MCNC: >2000 PG/ML — HIGH (ref 232–1245)
WBC # BLD: 6.07 K/UL — SIGNIFICANT CHANGE UP (ref 4.8–10.8)
WBC # FLD AUTO: 6.07 K/UL — SIGNIFICANT CHANGE UP (ref 4.8–10.8)

## 2018-04-28 RX ORDER — AMLODIPINE BESYLATE 2.5 MG/1
5 TABLET ORAL ONCE
Qty: 0 | Refills: 0 | Status: DISCONTINUED | OUTPATIENT
Start: 2018-04-28 | End: 2018-05-02

## 2018-04-28 RX ADMIN — Medication 100 MILLIGRAM(S): at 13:38

## 2018-04-28 RX ADMIN — Medication 100 MILLIGRAM(S): at 05:20

## 2018-04-28 RX ADMIN — CEFTRIAXONE 100 GRAM(S): 500 INJECTION, POWDER, FOR SOLUTION INTRAMUSCULAR; INTRAVENOUS at 12:50

## 2018-04-28 RX ADMIN — Medication 100 MILLIGRAM(S): at 22:04

## 2018-04-28 RX ADMIN — PANTOPRAZOLE SODIUM 40 MILLIGRAM(S): 20 TABLET, DELAYED RELEASE ORAL at 09:19

## 2018-04-28 NOTE — PROGRESS NOTE ADULT - ASSESSMENT
83yo Hungarian-speaking male with Past Medical History of DM and HTN admitted for nausea/vomiting and multiple episodes of diarrhea secondary to acute colitis.      Diarrhea secondary to acute colitis: C.diff PCR was negative  Continue IV Rocephin and Flagyl. s/p colonoscopy , resume diet , pain resolved/ diarrhea resolved  Liver Cirrhosis: rule out HCC.  Hepatitis B&C viral studies were negative.  Check AFP and CEA.  MRI SHOWS SUSPICIOUS LESIONS ,  , AFP NEGATIVE ,HEPATITIS PANEL NEGATIVE ,  oncology consult appreciated , will arrange with IR for biopsy of lesion   dysphagia -   appreciate ent eval , awaiting slp eval , will order ct soft tissue neck   Acute kidney injury: serum creatinine has recovered from admission.  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

## 2018-04-28 NOTE — PROGRESS NOTE ADULT - SUBJECTIVE AND OBJECTIVE BOX
82 year old Male with c/o dysphagia, liquids > solids, with intermittent improvement. Pt. still c/o pain to hard palate close to teeth but states that dysphagia is improved, Awaiting for SLP eval. No acute events overnight.  Pt's family at bedside translating to [place].          Vital Signs Last 24 Hrs  T(C): 36.8 (28 Apr 2018 05:07), Max: 36.9 (27 Apr 2018 13:03)  T(F): 98.2 (28 Apr 2018 05:07), Max: 98.4 (27 Apr 2018 13:03)  HR: 65 (28 Apr 2018 05:07) (54 - 65)  BP: 153/70 (28 Apr 2018 05:07) (130/70 - 190/86)  RR: 20 (28 Apr 2018 05:07) (17 - 20)  SpO2: 98% (27 Apr 2018 14:29) (98% - 99%)    PE:  General: A&Ox 3 in  NAD  HEENT: NC/AT, posterior oropharynx pink no edema or erythema, no tonsilar hypertrophy, no exudates B/L, uvula midline. neck supple, trache midline. - LAD.       LABS:                        11.9   6.07  )-----------( 284      ( 28 Apr 2018 05:22 )             34.6     04-28    138  |  105  |  14  ----------------------------<  214<H>  4.0   |  23  |  1.1    Ca    8.2<L>      28 Apr 2018 05:22       Assessment :  82 year old Male with dysphagia, liquids > solids, with intermittent improvement.        Plan:  No acute ENT intervention, FFL evaluation 4/25/18 WNL.   Consider SLP evaluation & possible diet modification.

## 2018-04-28 NOTE — PROGRESS NOTE ADULT - SUBJECTIVE AND OBJECTIVE BOX
JADENRACHEL  82y  [facility] [place] [address] A      Patient is a 82y old  Male who presents with a chief complaint of Diarrhea (21 Apr 2018 00:03)      INTERVAL HPI/OVERNIGHT EVENTS:      diarrhea resolved,     REVIEW OF SYSTEMS:  CONSTITUTIONAL: No fever, weight loss, or fatigue  EYES: No eye pain, visual disturbances, or discharge  ENMT:  No difficulty hearing, tinnitus, vertigo; No sinus or throat pain  NECK: No pain or stiffness  BREASTS: No pain, masses, or nipple discharge  RESPIRATORY: No cough, wheezing, chills or hemoptysis; No shortness of breath  CARDIOVASCULAR: No chest pain, palpitations, dizziness, or leg swelling  GASTROINTESTINAL: No abdominal or epigastric pain. No nausea, vomiting, or hematemesis; No diarrhea or constipation. No melena or hematochezia.  GENITOURINARY: No dysuria, frequency, hematuria, or incontinence  NEUROLOGICAL: No headaches, memory loss, loss of strength, numbness, or tremors  SKIN: No itching, burning, rashes, or lesions   LYMPH NODES: No enlarged glands  ENDOCRINE: No heat or cold intolerance; No hair loss  MUSCULOSKELETAL: No joint pain or swelling; No muscle, back, or extremity pain  PSYCHIATRIC: No depression, anxiety, mood swings, or difficulty sleeping  HEME/LYMPH: No easy bruising, or bleeding gums  ALLERY AND IMMUNOLOGIC: No hives or eczema  FAMILY HISTORY:  No pertinent family history in first degree relatives    T(C): 36.8 (04-28-18 @ 05:07), Max: 36.8 (04-28-18 @ 05:07)  HR: 65 (04-28-18 @ 05:07) (54 - 65)  BP: 153/70 (04-28-18 @ 05:07) (130/70 - 190/86)  RR: 20 (04-28-18 @ 05:07) (17 - 20)  SpO2: 98% (04-27-18 @ 14:29) (98% - 99%)  Wt(kg): --Vital Signs Last 24 Hrs  T(C): 36.8 (28 Apr 2018 05:07), Max: 36.8 (28 Apr 2018 05:07)  T(F): 98.2 (28 Apr 2018 05:07), Max: 98.2 (28 Apr 2018 05:07)  HR: 65 (28 Apr 2018 05:07) (54 - 65)  BP: 153/70 (28 Apr 2018 05:07) (130/70 - 190/86)  BP(mean): --  RR: 20 (28 Apr 2018 05:07) (17 - 20)  SpO2: 98% (27 Apr 2018 14:29) (98% - 99%)    PHYSICAL EXAM:  GENERAL: NAD, well-groomed, well-developed  HEAD:  Atraumatic, Normocephalic  EYES: EOMI, PERRLA, conjunctiva and sclera clear  ENMT: No tonsillar erythema, exudates, or enlargement; Moist mucous membranes, Good dentition, No lesions  NECK: Supple, No JVD, Normal thyroid  NERVOUS SYSTEM:  Alert & Oriented X3, Good concentration; Motor Strength 5/5 B/L upper and lower extremities; DTRs 2+ intact and symmetric  PULM: Clear to auscultation bilaterally  CARDIAC: Regular rate and rhythm; No murmurs, rubs, or gallops  GI: Soft, Nontender, Nondistended; Bowel sounds present  EXTREMITIES:  2+ Peripheral Pulses, No clubbing, cyanosis, or edema  LYMPH: No lymphadenopathy noted  SKIN: No rashes or lesions    Consultant(s) Notes Reviewed:  [x ] YES  [ ] NO  Care Discussed with Consultants/Other Providers [ x] YES  [ ] NO    LABS:                            11.9   6.07  )-----------( 284      ( 28 Apr 2018 05:22 )             34.6   04-28    138  |  105  |  14  ----------------------------<  214<H>  4.0   |  23  |  1.1    Ca    8.2<L>      28 Apr 2018 05:22              acetaminophen   Tablet 650 milliGRAM(s) Oral every 6 hours PRN  bisacodyl 15 milliGRAM(s) Oral every 6 hours PRN  cefTRIAXone   IVPB 2 Gram(s) IV Intermittent every 24 hours  metroNIDAZOLE  IVPB 500 milliGRAM(s) IV Intermittent every 8 hours  ondansetron Injectable 4 milliGRAM(s) IV Push every 6 hours PRN  pantoprazole    Tablet 40 milliGRAM(s) Oral before breakfast  polyethylene glycol/electrolyte Solution. 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

## 2018-04-29 LAB
ANION GAP SERPL CALC-SCNC: 10 MMOL/L — SIGNIFICANT CHANGE UP (ref 7–14)
BUN SERPL-MCNC: 14 MG/DL — SIGNIFICANT CHANGE UP (ref 10–20)
CALCIUM SERPL-MCNC: 8.3 MG/DL — LOW (ref 8.5–10.1)
CHLORIDE SERPL-SCNC: 101 MMOL/L — SIGNIFICANT CHANGE UP (ref 98–110)
CO2 SERPL-SCNC: 24 MMOL/L — SIGNIFICANT CHANGE UP (ref 17–32)
CREAT SERPL-MCNC: 1.1 MG/DL — SIGNIFICANT CHANGE UP (ref 0.7–1.5)
ESTIMATED AVERAGE GLUCOSE: 189 MG/DL — HIGH (ref 68–114)
FOLATE RBC-MCNC: 1269 NG/ML — SIGNIFICANT CHANGE UP (ref 499–1504)
GLUCOSE SERPL-MCNC: 124 MG/DL — HIGH (ref 70–99)
HBA1C BLD-MCNC: 8.2 % — HIGH (ref 4–5.6)
HCT VFR BLD CALC: 34.1 % — LOW (ref 42–52)
HGB BLD-MCNC: 11.7 G/DL — LOW (ref 14–18)
MCHC RBC-ENTMCNC: 31.4 PG — HIGH (ref 27–31)
MCHC RBC-ENTMCNC: 34.3 G/DL — SIGNIFICANT CHANGE UP (ref 32–37)
MCV RBC AUTO: 91.4 FL — SIGNIFICANT CHANGE UP (ref 80–94)
NRBC # BLD: 0 /100 WBCS — SIGNIFICANT CHANGE UP (ref 0–0)
PLATELET # BLD AUTO: 281 K/UL — SIGNIFICANT CHANGE UP (ref 130–400)
POTASSIUM SERPL-MCNC: 4 MMOL/L — SIGNIFICANT CHANGE UP (ref 3.5–5)
POTASSIUM SERPL-SCNC: 4 MMOL/L — SIGNIFICANT CHANGE UP (ref 3.5–5)
PROT SERPL-MCNC: 6.8 G/DL — SIGNIFICANT CHANGE UP (ref 6–8.3)
PROT SERPL-MCNC: 6.8 G/DL — SIGNIFICANT CHANGE UP (ref 6–8.3)
RBC # BLD: 3.73 M/UL — LOW (ref 4.7–6.1)
RBC # FLD: 14.6 % — HIGH (ref 11.5–14.5)
SODIUM SERPL-SCNC: 135 MMOL/L — SIGNIFICANT CHANGE UP (ref 135–146)
WBC # BLD: 5.76 K/UL — SIGNIFICANT CHANGE UP (ref 4.8–10.8)
WBC # FLD AUTO: 5.76 K/UL — SIGNIFICANT CHANGE UP (ref 4.8–10.8)

## 2018-04-29 RX ORDER — KETOROLAC TROMETHAMINE 30 MG/ML
15 SYRINGE (ML) INJECTION EVERY 12 HOURS
Qty: 0 | Refills: 0 | Status: DISCONTINUED | OUTPATIENT
Start: 2018-04-29 | End: 2018-04-29

## 2018-04-29 RX ADMIN — PANTOPRAZOLE SODIUM 40 MILLIGRAM(S): 20 TABLET, DELAYED RELEASE ORAL at 08:00

## 2018-04-29 RX ADMIN — Medication 100 MILLIGRAM(S): at 21:10

## 2018-04-29 RX ADMIN — CEFTRIAXONE 100 GRAM(S): 500 INJECTION, POWDER, FOR SOLUTION INTRAMUSCULAR; INTRAVENOUS at 12:41

## 2018-04-29 RX ADMIN — Medication 100 MILLIGRAM(S): at 13:26

## 2018-04-29 RX ADMIN — Medication 100 MILLIGRAM(S): at 05:40

## 2018-04-29 NOTE — PROGRESS NOTE ADULT - SUBJECTIVE AND OBJECTIVE BOX
JADENRACHEL  82y  [facility] [place] [address] A      Patient is a 82y old  Male who presents with a chief complaint of Diarrhea (21 Apr 2018 00:03)      INTERVAL HPI/OVERNIGHT EVENTS:  no acute complaints overnight , s/p neck ct       REVIEW OF SYSTEMS:  CONSTITUTIONAL: No fever, weight loss, or fatigue  EYES: No eye pain, visual disturbances, or discharge  ENMT:  No difficulty hearing, tinnitus, vertigo; No sinus or throat pain  NECK: No pain or stiffness  BREASTS: No pain, masses, or nipple discharge  RESPIRATORY: No cough, wheezing, chills or hemoptysis; No shortness of breath  CARDIOVASCULAR: No chest pain, palpitations, dizziness, or leg swelling  GASTROINTESTINAL: No abdominal or epigastric pain. No nausea, vomiting, or hematemesis; No diarrhea or constipation. No melena or hematochezia.  GENITOURINARY: No dysuria, frequency, hematuria, or incontinence  NEUROLOGICAL: No headaches, memory loss, loss of strength, numbness, or tremors  SKIN: No itching, burning, rashes, or lesions   LYMPH NODES: No enlarged glands  ENDOCRINE: No heat or cold intolerance; No hair loss  MUSCULOSKELETAL: No joint pain or swelling; No muscle, back, or extremity pain  PSYCHIATRIC: No depression, anxiety, mood swings, or difficulty sleeping  HEME/LYMPH: No easy bruising, or bleeding gums  ALLERY AND IMMUNOLOGIC: No hives or eczema  FAMILY HISTORY:  No pertinent family history in first degree relatives    T(C): 35.6 (04-29-18 @ 05:31), Max: 36.4 (04-28-18 @ 14:52)  HR: 62 (04-29-18 @ 05:31) (59 - 68)  BP: 136/62 (04-29-18 @ 05:31) (136/62 - 179/79)  RR: 18 (04-29-18 @ 05:31) (18 - 19)  SpO2: 98% (04-28-18 @ 20:30) (98% - 98%)  Wt(kg): --Vital Signs Last 24 Hrs  T(C): 35.6 (29 Apr 2018 05:31), Max: 36.4 (28 Apr 2018 14:52)  T(F): 96 (29 Apr 2018 05:31), Max: 97.6 (28 Apr 2018 14:52)  HR: 62 (29 Apr 2018 05:31) (59 - 68)  BP: 136/62 (29 Apr 2018 05:31) (136/62 - 179/79)  BP(mean): --  RR: 18 (29 Apr 2018 05:31) (18 - 19)  SpO2: 98% (28 Apr 2018 20:30) (98% - 98%)    PHYSICAL EXAM:  GENERAL: NAD, well-groomed, well-developed  HEAD:  Atraumatic, Normocephalic  EYES: EOMI, PERRLA, conjunctiva and sclera clear  ENMT: No tonsillar erythema, exudates, or enlargement; Moist mucous membranes, Good dentition, No lesions, tenderness on left neck but do not appreciate any lymphadenopahty or lesions   NECK: Supple, No JVD, Normal thyroid  NERVOUS SYSTEM:  Alert & Oriented X3, Good concentration; Motor Strength 5/5 B/L upper and lower extremities; DTRs 2+ intact and symmetric  PULM: Clear to auscultation bilaterally  CARDIAC: Regular rate and rhythm; No murmurs, rubs, or gallops  GI: Soft, Nontender, Nondistended; Bowel sounds present  EXTREMITIES:  2+ Peripheral Pulses, No clubbing, cyanosis, or edema  LYMPH: No lymphadenopathy noted  SKIN: No rashes or lesions    Consultant(s) Notes Reviewed:  [x ] YES  [ ] NO  Care Discussed with Consultants/Other Providers [ x] YES  [ ] NO    LABS:                            11.7   5.76  )-----------( 281      ( 29 Apr 2018 06:36 )             34.1   04-29    135  |  101  |  14  ----------------------------<  124<H>  4.0   |  24  |  1.1    Ca    8.3<L>      29 Apr 2018 06:36    Hemoglobin A1C with Mean Plasma Glucose (04.29.18 @ 06:36)    Hemoglobin A1C, Whole Blood: 8.2 %    Mean Plasma Glucose: 189 mg/dL              acetaminophen   Tablet 650 milliGRAM(s) Oral every 6 hours PRN  amLODIPine   Tablet 5 milliGRAM(s) Oral once PRN  bisacodyl 15 milliGRAM(s) Oral every 6 hours PRN  cefTRIAXone   IVPB 2 Gram(s) IV Intermittent every 24 hours  metroNIDAZOLE  IVPB 500 milliGRAM(s) IV Intermittent every 8 hours  ondansetron Injectable 4 milliGRAM(s) IV Push every 6 hours PRN  pantoprazole    Tablet 40 milliGRAM(s) Oral before breakfast  polyethylene glycol/electrolyte Solution. 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

## 2018-04-29 NOTE — PROGRESS NOTE ADULT - SUBJECTIVE AND OBJECTIVE BOX
SUBJECTIVE:    Patient is a 82y old Male who presents with a chief complaint of Diarrhea (21 Apr 2018 00:03)    Currently admitted to medicine with the primary diagnosis of Severe sepsis     Today is hospital day 9d. This morning he is resting comfortably in bed. No diarrhea.     PAST MEDICAL & SURGICAL HISTORY  HTN (hypertension)  DM (diabetes mellitus)  UTI (urinary tract infection)  Depression  Cirrhosis  No significant past surgical history    SOCIAL HISTORY:  Negative for smoking/alcohol/drug use.     ALLERGIES:  No Known Allergies    MEDICATIONS:  STANDING MEDICATIONS  cefTRIAXone   IVPB 2 Gram(s) IV Intermittent every 24 hours  metroNIDAZOLE  IVPB 500 milliGRAM(s) IV Intermittent every 8 hours  pantoprazole    Tablet 40 milliGRAM(s) Oral before breakfast  polyethylene glycol/electrolyte Solution. 4000 milliLiter(s) Oral once    PRN MEDICATIONS  acetaminophen   Tablet 650 milliGRAM(s) Oral every 6 hours PRN  amLODIPine   Tablet 5 milliGRAM(s) Oral once PRN  bisacodyl 15 milliGRAM(s) Oral every 6 hours PRN  ondansetron Injectable 4 milliGRAM(s) IV Push every 6 hours PRN    VITALS:   T(F): 97.6  HR: 55  BP: 153/74  RR: 18  SpO2: 98%    LABS:                        11.7   5.76  )-----------( 281      ( 29 Apr 2018 06:36 )             34.1     04-29    135  |  101  |  14  ----------------------------<  124<H>  4.0   |  24  |  1.1    Ca    8.3<L>      29 Apr 2018 06:36    CAPILLARY BLOOD GLUCOSE  140 (29 Apr 2018 16:55)  131 (29 Apr 2018 11:17)  151 (28 Apr 2018 21:19)    RADIOLOGY:  < from: CT Neck Soft Tissue w/ IV Cont (04.28.18 @ 21:56) >    Impression:    Unremarkable evaluation of the soft tissues of the neck.    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 Consider MRI of the cervical spine for further evaluation.    < from: CT Chest w/ IV Cont (04.27.18 @ 23:18) >  IMPRESSION:    1.  Scattered focal areas of interstitial septal thickening with small   nodularity, indeterminant etiology. No baseline CT chest comparison. With   given history of HCC, suggest short-term follow-up CT chest in 8-10 weeks.  2.  Mild interstitial pulmonary edema.      PHYSICAL EXAM:  GEN: No acute distress  HEENT: NC/AT  LUNGS: Clear to auscultation bilaterally   HEART: S1/S2 present. RRR.   ABD: Soft, mild TTP non-distended. Bowel sounds present  EXT: NC/NC/NE/2+PP/CLARKE  NEURO: AAOX3

## 2018-04-29 NOTE — SWALLOW BEDSIDE ASSESSMENT ADULT - ESOPHAGEAL PHASE
pt refused hard solid trials reporting discomfort at sternal level when eating. possible esophageal dysfunction

## 2018-04-29 NOTE — PROGRESS NOTE ADULT - ASSESSMENT
82M pmh of DM, HTN p/w 1 episode of vomiting and 8 episodes of diarrhea    #Mass within the hepatic dome and imaging consistent with cirrhosis  - Heme/Onc eval appreciated; IR consulted for biopsy of liver lesion. CT chest IV contrast showed Scattered focal areas of interstitial septal thickening with small nodularity  - MRI confirmed 3 liver lesions suggestive of hepatocellular carcinoma vs. Cholangiocarcinoma, 1 possible high grade dysplastic nodule and 1 dysplastic nodule vs vascular shunt  -  Hepatitis panel, A1A  CEA, ELISEO, Anti-SM, and AFP negative.  - CA 19-9, AMA, Cerruloplasmin pending  - No ETOH abuse hx, pt/family unaware of dx  - Alk phos still elevated. AST ALT mildly elevated. Will monitor    #Colitis  - Pt s/p colonoscopy; mild patchy areas of inflammation and erosions seen in the ascending colon, moderate in transverse colon and mild in descending colon. Two biopsies taken. Mild diverticulosis found in sigmoid colon, internal hemorrhoids   - Per GI recs, regular diet as tolerated; f/u results of biopsy specimens. Oncology on board; IR consulted for biopsy of liver lesions.  - C diff negative  - Will continue on ceftriaxone and flagyl     #Anemia  - Vit B12 >1200 and Folic Acid normal  - Heme/ Onc following    #Dysphagia  - CT neck normal soft tissue, also showed disk herniation with mod spinal stenosis    #Cystitis  - Dysuria improving  - Ucx showed no growth although U/A showed 6-10 WBC's and few bacteria  - Continue Ceftriaxone    # Diarrhea  - Resolved  - C Diff and Stool cx negative    # JOSE with Electrolyte abnormalities  - Hyponatremia- stable  - Hyperkalemia- resolved  - JOSE- likely pre-renal, resolved    # DM  - Glucose controlled; will continue to monitor  - c/w FS and insulin    # HTN  - BP stable    GI/DVT PPX  From [place]

## 2018-04-29 NOTE — PROGRESS NOTE ADULT - ASSESSMENT
81yo Malay-speaking male with Past Medical History of DM and HTN admitted for nausea/vomiting and multiple episodes of diarrhea secondary to acute colitis.      Diarrhea secondary to acute colitis: Continue IV Rocephin and Flagyl. s/p colonoscopy   Liver Cirrhosis: rule out HCC.  Hepatitis B&C viral studies were negative.  Check AFP and CEA.  MRI SHOWS SUSPICIOUS LESIONS ,  , AFP NEGATIVE ,HEPATITIS PANEL NEGATIVE ,  oncology consult appreciated , will arrange with IR for biopsy of lesion in AM  dysphagia -   CT SOFT TISSUE COMPLETED , Awaiting report   Acute kidney injury: serum creatinine has recovered from admission. CKD 2  DMII: monitor FS and start Lantus/Lispro if FS >200, will need close outpatient monitoring since hemoglobin a1c is diagnostic for DM and would require medication, will d/c on low dose metformin 500 mg qd and outpatient follow up   GI/DVT prophylaxis

## 2018-04-29 NOTE — SWALLOW BEDSIDE ASSESSMENT ADULT - SWALLOW EVAL: DIAGNOSIS
toleration observed for soft and thins. pt states harder foods or meats have difficulty "going down." pt reports softer or moist foods go down more easily. daughter present to provide interpretatino

## 2018-04-30 LAB
ANION GAP SERPL CALC-SCNC: 9 MMOL/L — SIGNIFICANT CHANGE UP (ref 7–14)
BUN SERPL-MCNC: 14 MG/DL — SIGNIFICANT CHANGE UP (ref 10–20)
CALCIUM SERPL-MCNC: 8.3 MG/DL — LOW (ref 8.5–10.1)
CANCER AG19-9 SERPL-ACNC: 63.5 U/ML — HIGH
CERULOPLASMIN SERPL-MCNC: 26 MG/DL — SIGNIFICANT CHANGE UP (ref 20–60)
CHLORIDE SERPL-SCNC: 104 MMOL/L — SIGNIFICANT CHANGE UP (ref 98–110)
CO2 SERPL-SCNC: 24 MMOL/L — SIGNIFICANT CHANGE UP (ref 17–32)
CREAT SERPL-MCNC: 1.1 MG/DL — SIGNIFICANT CHANGE UP (ref 0.7–1.5)
GLUCOSE SERPL-MCNC: 121 MG/DL — HIGH (ref 70–99)
HCT VFR BLD CALC: 33.7 % — LOW (ref 42–52)
HGB BLD-MCNC: 11.6 G/DL — LOW (ref 14–18)
MCHC RBC-ENTMCNC: 31.3 PG — HIGH (ref 27–31)
MCHC RBC-ENTMCNC: 34.4 G/DL — SIGNIFICANT CHANGE UP (ref 32–37)
MCV RBC AUTO: 90.8 FL — SIGNIFICANT CHANGE UP (ref 80–94)
NRBC # BLD: 0 /100 WBCS — SIGNIFICANT CHANGE UP (ref 0–0)
PLATELET # BLD AUTO: 278 K/UL — SIGNIFICANT CHANGE UP (ref 130–400)
POTASSIUM SERPL-MCNC: 4.1 MMOL/L — SIGNIFICANT CHANGE UP (ref 3.5–5)
POTASSIUM SERPL-SCNC: 4.1 MMOL/L — SIGNIFICANT CHANGE UP (ref 3.5–5)
RBC # BLD: 3.71 M/UL — LOW (ref 4.7–6.1)
RBC # FLD: 14.4 % — SIGNIFICANT CHANGE UP (ref 11.5–14.5)
SODIUM SERPL-SCNC: 137 MMOL/L — SIGNIFICANT CHANGE UP (ref 135–146)
SURGICAL PATHOLOGY STUDY: SIGNIFICANT CHANGE UP
WBC # BLD: 5.49 K/UL — SIGNIFICANT CHANGE UP (ref 4.8–10.8)
WBC # FLD AUTO: 5.49 K/UL — SIGNIFICANT CHANGE UP (ref 4.8–10.8)

## 2018-04-30 RX ADMIN — Medication 100 MILLIGRAM(S): at 05:56

## 2018-04-30 RX ADMIN — PANTOPRAZOLE SODIUM 40 MILLIGRAM(S): 20 TABLET, DELAYED RELEASE ORAL at 07:54

## 2018-04-30 RX ADMIN — Medication 100 MILLIGRAM(S): at 22:05

## 2018-04-30 RX ADMIN — Medication 100 MILLIGRAM(S): at 13:43

## 2018-04-30 RX ADMIN — CEFTRIAXONE 100 GRAM(S): 500 INJECTION, POWDER, FOR SOLUTION INTRAMUSCULAR; INTRAVENOUS at 17:20

## 2018-04-30 NOTE — PROGRESS NOTE ADULT - SUBJECTIVE AND OBJECTIVE BOX
JADENRACHEL  82y  [facility] [address] [place] [address] A      Patient is a 82y old  Male who presents with a chief complaint of Diarrhea (21 Apr 2018 00:03)      INTERVAL HPI/OVERNIGHT EVENTS:  no events overnight       REVIEW OF SYSTEMS:  CONSTITUTIONAL: No fever, weight loss, or fatigue  EYES: No eye pain, visual disturbances, or discharge  ENMT:  No difficulty hearing, tinnitus, vertigo; No sinus or throat pain  NECK: No pain or stiffness  BREASTS: No pain, masses, or nipple discharge  RESPIRATORY: No cough, wheezing, chills or hemoptysis; No shortness of breath  CARDIOVASCULAR: No chest pain, palpitations, dizziness, or leg swelling  GASTROINTESTINAL: No abdominal or epigastric pain. No nausea, vomiting, or hematemesis; No diarrhea or constipation. No melena or hematochezia.  GENITOURINARY: No dysuria, frequency, hematuria, or incontinence  NEUROLOGICAL: No headaches, memory loss, loss of strength, numbness, or tremors  SKIN: No itching, burning, rashes, or lesions   LYMPH NODES: No enlarged glands  ENDOCRINE: No heat or cold intolerance; No hair loss  MUSCULOSKELETAL: No joint pain or swelling; No muscle, back, or extremity pain  PSYCHIATRIC: No depression, anxiety, mood swings, or difficulty sleeping  HEME/LYMPH: No easy bruising, or bleeding gums  ALLERY AND IMMUNOLOGIC: No hives or eczema  FAMILY HISTORY:  No pertinent family history in first degree relatives    T(C): 36.8 (04-30-18 @ 07:03), Max: 36.8 (04-30-18 @ 07:03)  HR: 63 (04-30-18 @ 07:03) (55 - 63)  BP: 138/65 (04-30-18 @ 07:03) (138/65 - 153/74)  RR: 18 (04-30-18 @ 07:03) (18 - 18)  SpO2: --  Wt(kg): --Vital Signs Last 24 Hrs  T(C): 36.8 (30 Apr 2018 07:03), Max: 36.8 (30 Apr 2018 07:03)  T(F): 98.3 (30 Apr 2018 07:03), Max: 98.3 (30 Apr 2018 07:03)  HR: 63 (30 Apr 2018 07:03) (55 - 63)  BP: 138/65 (30 Apr 2018 07:03) (138/65 - 153/74)  BP(mean): --  RR: 18 (30 Apr 2018 07:03) (18 - 18)  SpO2: --    PHYSICAL EXAM:  GENERAL: NAD, well-groomed, well-developed  HEAD:  Atraumatic, Normocephalic  EYES: EOMI, PERRLA, conjunctiva and sclera clear  ENMT: No tonsillar erythema, exudates, or enlargement; Moist mucous membranes, Good dentition, No lesions  NECK: Supple, No JVD, Normal thyroid  NERVOUS SYSTEM:  Alert & Oriented X3, Good concentration; Motor Strength 5/5 B/L upper and lower extremities; DTRs 2+ intact and symmetric  PULM: Clear to auscultation bilaterally  CARDIAC: Regular rate and rhythm; No murmurs, rubs, or gallops  GI: Soft, Nontender, Nondistended; Bowel sounds present  EXTREMITIES:  2+ Peripheral Pulses, No clubbing, cyanosis, or edema  LYMPH: No lymphadenopathy noted  SKIN: No rashes or lesions    Consultant(s) Notes Reviewed:  [x ] YES  [ ] NO  Care Discussed with Consultants/Other Providers [ x] YES  [ ] NO    LABS:                            11.6   5.49  )-----------( 278      ( 30 Apr 2018 07:18 )             33.7   04-30    137  |  104  |  14  ----------------------------<  121<H>  4.1   |  24  |  1.1    Ca    8.3<L>      30 Apr 2018 07:18              acetaminophen   Tablet 650 milliGRAM(s) Oral every 6 hours PRN  amLODIPine   Tablet 5 milliGRAM(s) Oral once PRN  bisacodyl 15 milliGRAM(s) Oral every 6 hours PRN  cefTRIAXone   IVPB 2 Gram(s) IV Intermittent every 24 hours  metroNIDAZOLE  IVPB 500 milliGRAM(s) IV Intermittent every 8 hours  ondansetron Injectable 4 milliGRAM(s) IV Push every 6 hours PRN  pantoprazole    Tablet 40 milliGRAM(s) Oral before breakfast  polyethylene glycol/electrolyte Solution. 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

## 2018-04-30 NOTE — CONSULT NOTE ADULT - SUBJECTIVE AND OBJECTIVE BOX
INTERVENTIONAL RADIOLOGY CONSULT:     Procedure Requested: liver biopsy    HPI:  83 y/o Citizen of [place] speaking male from [place] with a hx of DM, HTN was BIB by family  1 episode of vomiting and 8 episodes of diarrhea.    Per daughter at bedside, pt arrived approx. 2 weeks ago and has been eating at home. He suddenly developed diarrhea this AM with nasuea and an episode of vomiting. She states he has had diarrhea in [place] approx. 6 months apart and was told he had viral infection. He has also had UTI in the past. Presently, pt and family denied a known hx of cirrhosis, ETOH use, blood transfusions, known hx of hepatitis. Pt does attest to weight loss over a course of 1 year although he cannot quantify and also tells me he has poor appetite. He complains of mild dysuria and urinary urgency, but does empty his bladder. Since being admitted to , he did not report further vomiting, but does complain of nausea.   He denied fevers, chills, SOB, CP or weakness     ** Pt's daughter will bring the medications in the morning (21 Apr 2018 00:03)      PAST MEDICAL & SURGICAL HISTORY:  HTN (hypertension)  DM (diabetes mellitus)  UTI (urinary tract infection)  Depression  Cirrhosis  No significant past surgical history      MEDICATIONS  (STANDING):  cefTRIAXone   IVPB 2 Gram(s) IV Intermittent every 24 hours  metroNIDAZOLE  IVPB 500 milliGRAM(s) IV Intermittent every 8 hours  pantoprazole    Tablet 40 milliGRAM(s) Oral before breakfast  polyethylene glycol/electrolyte Solution. 4000 milliLiter(s) Oral once    MEDICATIONS  (PRN):  acetaminophen   Tablet 650 milliGRAM(s) Oral every 6 hours PRN For Temp greater than 38 C (100.4 F)  amLODIPine   Tablet 5 milliGRAM(s) Oral once PRN bp >180  bisacodyl 15 milliGRAM(s) Oral every 6 hours PRN Constipation  ondansetron Injectable 4 milliGRAM(s) IV Push every 6 hours PRN Nausea and/or Vomiting      Allergies    No Known Allergies    Intolerances        Social History:   Smoking: Yes [ ]  No [ ]   ______pk yrs  ETOH  Yes [ ]  No [ ]  Social [ ]  DRUGS:  Yes [ ]  No [ ]  if so what______________    FAMILY HISTORY:  No pertinent family history in first degree relatives      Physical Exam:   Vital Signs Last 24 Hrs  T(C): 36.8 (30 Apr 2018 07:03), Max: 36.8 (30 Apr 2018 07:03)  T(F): 98.3 (30 Apr 2018 07:03), Max: 98.3 (30 Apr 2018 07:03)  HR: 63 (30 Apr 2018 07:03) (55 - 63)  BP: 138/65 (30 Apr 2018 07:03) (138/65 - 153/74)  BP(mean): --  RR: 18 (30 Apr 2018 07:03) (18 - 18)  SpO2: --      Labs:                         11.6   5.49  )-----------( 278      ( 30 Apr 2018 07:18 )             33.7     04-30    137  |  104  |  14  ----------------------------<  121<H>  4.1   |  24  |  1.1    Ca    8.3<L>      30 Apr 2018 07:18          Pertinent labs:                      11.6   5.49  )-----------( 278      ( 30 Apr 2018 07:18 )             33.7       04-30    137  |  104  |  14  ----------------------------<  121<H>  4.1   |  24  |  1.1    Ca    8.3<L>      30 Apr 2018 07:18      Radiology & Additional Studies:   pending review    ASSESSMENT/ PLAN:   - consulted by primary team for liver biopsy - multiple liver lesions seen on prior imaging suggestive of HCC  - will further evaluate imaging before scheduling procedure     Thank you for the courtesy of this consult, please call [phone] with any further questions. INTERVENTIONAL RADIOLOGY CONSULT:     Procedure Requested: liver biopsy    HPI:  81 y/o [place] speaking male from [place] with a hx of DM, HTN was BIB by family  1 episode of vomiting and 8 episodes of diarrhea.    Per daughter at bedside, pt arrived approx. 2 weeks ago and has been eating at home. He suddenly developed diarrhea this AM with nasuea and an episode of vomiting. She states he has had diarrhea in [place] approx. 6 months apart and was told he had viral infection. He has also had UTI in the past. Presently, pt and family denied a known hx of cirrhosis, ETOH use, blood transfusions, known hx of hepatitis. Pt does attest to weight loss over a course of 1 year although he cannot quantify and also tells me he has poor appetite. He complains of mild dysuria and urinary urgency, but does empty his bladder. Since being admitted to , he did not report further vomiting, but does complain of nausea.   He denied fevers, chills, SOB, CP or weakness     ** Pt's daughter will bring the medications in the morning (21 Apr 2018 00:03)      PAST MEDICAL & SURGICAL HISTORY:  HTN (hypertension)  DM (diabetes mellitus)  UTI (urinary tract infection)  Depression  Cirrhosis  No significant past surgical history      MEDICATIONS  (STANDING):  cefTRIAXone   IVPB 2 Gram(s) IV Intermittent every 24 hours  metroNIDAZOLE  IVPB 500 milliGRAM(s) IV Intermittent every 8 hours  pantoprazole    Tablet 40 milliGRAM(s) Oral before breakfast  polyethylene glycol/electrolyte Solution. 4000 milliLiter(s) Oral once    MEDICATIONS  (PRN):  acetaminophen   Tablet 650 milliGRAM(s) Oral every 6 hours PRN For Temp greater than 38 C (100.4 F)  amLODIPine   Tablet 5 milliGRAM(s) Oral once PRN bp >180  bisacodyl 15 milliGRAM(s) Oral every 6 hours PRN Constipation  ondansetron Injectable 4 milliGRAM(s) IV Push every 6 hours PRN Nausea and/or Vomiting      Allergies    No Known Allergies    Intolerances        Social History:   Smoking: Yes [ ]  No [ ]   ______pk yrs  ETOH  Yes [ ]  No [ ]  Social [ ]  DRUGS:  Yes [ ]  No [ ]  if so what______________    FAMILY HISTORY:  No pertinent family history in first degree relatives      Physical Exam:   Vital Signs Last 24 Hrs  T(C): 36.8 (30 Apr 2018 07:03), Max: 36.8 (30 Apr 2018 07:03)  T(F): 98.3 (30 Apr 2018 07:03), Max: 98.3 (30 Apr 2018 07:03)  HR: 63 (30 Apr 2018 07:03) (55 - 63)  BP: 138/65 (30 Apr 2018 07:03) (138/65 - 153/74)  BP(mean): --  RR: 18 (30 Apr 2018 07:03) (18 - 18)  SpO2: --      Labs:                         11.6   5.49  )-----------( 278      ( 30 Apr 2018 07:18 )             33.7     04-30    137  |  104  |  14  ----------------------------<  121<H>  4.1   |  24  |  1.1    Ca    8.3<L>      30 Apr 2018 07:18          Pertinent labs:                      11.6   5.49  )-----------( 278      ( 30 Apr 2018 07:18 )             33.7       04-30    137  |  104  |  14  ----------------------------<  121<H>  4.1   |  24  |  1.1    Ca    8.3<L>      30 Apr 2018 07:18      Radiology & Additional Studies:   < from: MR Abdomen w/ IV Cont (04.25.18 @ 17:59) >  IMPRESSION:  Cirrhotic liver, with 3 distinct liver lesions are identified.    Peripherally enhancing hypovascular mass measuring 2.7 x 2.4 cm on series   901 image 21, at the right hepatic dome in segment 8. Differential   includes hypovascular hepatocellular carcinoma versus cholangiocarcinoma.    Mild T2 hyperintense mass measuring 2.0 x 1.9 cm on series 4 image 20 in   segment 3/4b.  It demonstrates heterogeneous arterial phase enhancement   within the mass without definite venous phase washout. The mass is   indeterminate at this time and could represent a high-grade dysplastic   nodule.    1.0 cm arterial enhancing focus in segment 7 on series 901 image 42   without definite portal venous base washout. Differential includes a   dysplastic nodule or less likely a vascular shunt.    A 3 month follow-up MRI with contrast is recommended.    < end of copied text >      ASSESSMENT/ PLAN:   - consulted by primary team for liver biopsy - multiple liver lesions seen on prior imaging suggestive of HCC  - will further evaluate imaging before scheduling procedure     Addendum:  - due to patients age and high risk location of biopsy will do bedside assessment of patient and discuss risk vs. benefit of procedure before proceeding    Thank you for the courtesy of this consult, please call [phone] with any further questions.

## 2018-04-30 NOTE — PROGRESS NOTE ADULT - ASSESSMENT
83yo [place]-speaking male with Past Medical History of DM and HTN admitted for nausea/vomiting and multiple episodes of diarrhea secondary to acute colitis.      Diarrhea secondary to acute colitis: Continue IV Rocephin and Flagyl. s/p colonoscopy   Liver Cirrhosis: rule out HCC.  Hepatitis B&C viral studies were negative.  Check AFP and CEA.  MRI SHOWS SUSPICIOUS LESIONS ,  , AFP NEGATIVE ,HEPATITIS PANEL NEGATIVE , IR reviewing case for possible biopsy  dysphagia -   resolved , ct scan shows no pathology   Acute kidney injury: serum creatinine has recovered from admission. CKD 2  DMII: monitor FS and start Lantus/Lispro if FS >200, will need close outpatient monitoring since hemoglobin a1c is diagnostic for DM and would require medication, will d/c on low dose metformin 500 mg qd and outpatient follow up   GI/DVT prophylaxis

## 2018-04-30 NOTE — PROGRESS NOTE ADULT - SUBJECTIVE AND OBJECTIVE BOX
SUBJECTIVE:    Patient is a 82y old Male who presents with a chief complaint of Diarrhea (21 Apr 2018 00:03)    Currently admitted to medicine with the primary diagnosis of Severe sepsis     Today is hospital day 10d. This morning he is resting comfortably in bed and reports no new issues or overnight events. Pt denies abdominal pain, no diarrhea, tolerating regular diet.     PAST MEDICAL & SURGICAL HISTORY  HTN (hypertension)  DM (diabetes mellitus)  UTI (urinary tract infection)  Depression  Cirrhosis  No significant past surgical history    SOCIAL HISTORY:  Negative for smoking/alcohol/drug use.     ALLERGIES:  No Known Allergies    MEDICATIONS:  STANDING MEDICATIONS  cefTRIAXone   IVPB 2 Gram(s) IV Intermittent every 24 hours  metroNIDAZOLE  IVPB 500 milliGRAM(s) IV Intermittent every 8 hours  pantoprazole    Tablet 40 milliGRAM(s) Oral before breakfast  polyethylene glycol/electrolyte Solution. 4000 milliLiter(s) Oral once    PRN MEDICATIONS  acetaminophen   Tablet 650 milliGRAM(s) Oral every 6 hours PRN  amLODIPine   Tablet 5 milliGRAM(s) Oral once PRN  bisacodyl 15 milliGRAM(s) Oral every 6 hours PRN  ondansetron Injectable 4 milliGRAM(s) IV Push every 6 hours PRN    VITALS:   T(F): 98.3  HR: 63  BP: 138/65  RR: 18  SpO2: --    LABS:                        11.6   5.49  )-----------( 278      ( 30 Apr 2018 07:18 )             33.7     04-30    137  |  104  |  14  ----------------------------<  121<H>  4.1   |  24  |  1.1    Ca    8.3<L>      30 Apr 2018 07:18    CAPILLARY BLOOD GLUCOSE  108 (30 Apr 2018 07:03)  159 (29 Apr 2018 22:29)  140 (29 Apr 2018 16:55)  131 (29 Apr 2018 11:17)    RADIOLOGY:        PHYSICAL EXAM:  GEN: No acute distress  HEENT: NC/AT  LUNGS: Clear to auscultation bilaterally   HEART: S1/S2 present. RRR.   ABD: Soft, non-tender, non-distended. Bowel sounds present  EXT: NC/NC/NE/2+PP/CLARKE  NEURO: AAOX3 SUBJECTIVE:    Patient is a 82y old Male who presents with a chief complaint of Diarrhea (21 Apr 2018 00:03)    Currently admitted to medicine with the primary diagnosis of Severe sepsis     Today is hospital day 10d. This morning he is resting comfortably in bed and reports no new issues or overnight events. Pt denies abdominal pain, no diarrhea, tolerating regular diet.     PAST MEDICAL & SURGICAL HISTORY  HTN (hypertension)  DM (diabetes mellitus)  UTI (urinary tract infection)  Depression  Cirrhosis  No significant past surgical history    SOCIAL HISTORY:  Negative for smoking/alcohol/drug use.     ALLERGIES:  No Known Allergies    MEDICATIONS:  STANDING MEDICATIONS  cefTRIAXone   IVPB 2 Gram(s) IV Intermittent every 24 hours  metroNIDAZOLE  IVPB 500 milliGRAM(s) IV Intermittent every 8 hours  pantoprazole    Tablet 40 milliGRAM(s) Oral before breakfast  polyethylene glycol/electrolyte Solution. 4000 milliLiter(s) Oral once    PRN MEDICATIONS  acetaminophen   Tablet 650 milliGRAM(s) Oral every 6 hours PRN  amLODIPine   Tablet 5 milliGRAM(s) Oral once PRN  bisacodyl 15 milliGRAM(s) Oral every 6 hours PRN  ondansetron Injectable 4 milliGRAM(s) IV Push every 6 hours PRN    VITALS:   T(F): 98.3  HR: 63  BP: 138/65  RR: 18  SpO2: --    LABS:                        11.6   5.49  )-----------( 278      ( 30 Apr 2018 07:18 )             33.7     04-30    137  |  104  |  14  ----------------------------<  121<H>  4.1   |  24  |  1.1    Ca    8.3<L>      30 Apr 2018 07:18    CAPILLARY BLOOD GLUCOSE  108 (30 Apr 2018 07:03)  159 (29 Apr 2018 22:29)  140 (29 Apr 2018 16:55)  131 (29 Apr 2018 11:17)    RADIOLOGY:        PHYSICAL EXAM:  GEN: No acute distress  HEENT: NC/AT  LUNGS: Clear to auscultation bilaterally   HEART: S1/S2 present. RRR.   ABD: Soft, non-tender, non-distended. Bowel sounds present. No suprapubic tenderness  EXT: NC/NC/NE/2+PP/CLARKE  NEURO: AAOX3, no focal deficits

## 2018-04-30 NOTE — PROGRESS NOTE ADULT - ASSESSMENT
82M pmh of DM, HTN p/w 1 episode of vomiting and 8 episodes of diarrhea    #Mass within the hepatic dome and imaging consistent with cirrhosis  - Heme/Onc eval appreciated; IR consulted for biopsy of liver lesion. Awaiting f/u and/or call back  - CT chest IV contrast showed Scattered focal areas of interstitial septal thickening with small nodularity. f/u heme onc recs  - MRI confirmed 3 liver lesions suggestive of hepatocellular carcinoma vs. Cholangiocarcinoma, 1 possible high grade dysplastic nodule and 1 dysplastic nodule vs vascular shunt  -  Hepatitis panel, A1A  CEA, ELISEO, Anti-SM, and AFP negative.  - CA 19-9, AMA, Cerruloplasmin pending  - No ETOH abuse hx, pt/family unaware of dx  - Alk phos still elevated. AST ALT mildly elevated. Will monitor    #Colitis  - Pt s/p colonoscopy; mild patchy areas of inflammation and erosions seen in the ascending colon, moderate in transverse colon and mild in descending colon. Two biopsies taken. Mild diverticulosis found in sigmoid colon, internal hemorrhoids   - Per GI recs, regular diet as tolerated; f/u results of biopsy specimens.   - C diff negative  - Will continue on ceftriaxone and flagyl     #Anemia  - Vit B12 >1200 and Folic Acid normal  - Heme/ Onc following    #Dysphagia  - CT neck normal soft tissue, also showed disk herniation with mod spinal stenosis    #Cystitis  - Dysuria improving  - Ucx showed no growth although U/A showed 6-10 WBC's and few bacteria  - Continue Ceftriaxone    # Diarrhea  - Resolved  - C Diff and Stool cx negative    # JOSE with Electrolyte abnormalities  - Hyponatremia- stable  - Hyperkalemia- resolved  - JOSE- likely pre-renal, resolved    # DM  - Glucose controlled; will continue to monitor  - c/w FS and insulin    # HTN  - BP stable    GI/DVT PPX  From [place] 82M pmh of DM, HTN p/w 1 episode of vomiting and 8 episodes of diarrhea    #Mass within the hepatic dome and imaging consistent with cirrhosis  - Heme/Onc eval appreciated; IR consulted for biopsy of liver lesion. Awaiting f/u and/or call back  - CT chest IV contrast showed Scattered focal areas of interstitial septal thickening with small nodularity. f/u heme onc recs  - MRI confirmed 3 liver lesions suggestive of hepatocellular carcinoma vs. Cholangiocarcinoma, 1 possible high grade dysplastic nodule and 1 dysplastic nodule vs vascular shunt  -  Hepatitis panel, A1A  CEA, ELISEO, Anti-SM, and AFP negative.  - CA 19-9, AMA, Cerruloplasmin pending  - No ETOH abuse hx, pt/family unaware of dx  - Alk phos still elevated. AST ALT mildly elevated. Will monitor    #Colitis  - Pt s/p colonoscopy; mild patchy areas of inflammation and erosions seen in the ascending colon, moderate in transverse colon and mild in descending colon. Two biopsies taken. Mild diverticulosis found in sigmoid colon, internal hemorrhoids   - GI following, f/u biopsy results  - Tolerating regular diet PO  - C diff negative  - Will continue on ceftriaxone and flagyl     #Anemia  - Vit B12 >1200 and Folic Acid normal  - Heme/ Onc following    #Dysphagia  - CT neck normal soft tissue, also showed disk herniation with mod spinal stenosis    #Cystitis  - suprapubic tenderness resolved. no dysuria  - Ucx showed no growth although U/A showed 6-10 WBC's and few bacteria  - Continue Ceftriaxone    # Diarrhea  - Resolved  - C Diff and Stool cx negative    # JOSE with Electrolyte abnormalities  - Hyponatremia- resolved  - Hyperkalemia- resolved  - JOSE- likely pre-renal, resolved    # DM  - Glucose controlled; will continue to monitor  - c/w FS and insulin    # HTN  - BP stable    GI/DVT PPX  From [place]

## 2018-05-01 ENCOUNTER — RESULT REVIEW (OUTPATIENT)
Age: 83
End: 2018-05-01

## 2018-05-01 LAB
% ALBUMIN: 44.5 % — SIGNIFICANT CHANGE UP
% ALPHA 1: 5 % — SIGNIFICANT CHANGE UP
% ALPHA 2: 14 % — SIGNIFICANT CHANGE UP
% BETA: 17.1 % — SIGNIFICANT CHANGE UP
% GAMMA: 19.4 % — SIGNIFICANT CHANGE UP
% M SPIKE: SIGNIFICANT CHANGE UP %
ALBUMIN SERPL ELPH-MCNC: 3 G/DL — LOW (ref 3.6–5.5)
ALBUMIN/GLOB SERPL ELPH: 0.8 RATIO — SIGNIFICANT CHANGE UP
ALPHA1 GLOB SERPL ELPH-MCNC: 0.3 G/DL — SIGNIFICANT CHANGE UP (ref 0.1–0.4)
ALPHA2 GLOB SERPL ELPH-MCNC: 1 G/DL — SIGNIFICANT CHANGE UP (ref 0.5–1)
ANION GAP SERPL CALC-SCNC: 12 MMOL/L — SIGNIFICANT CHANGE UP (ref 7–14)
APTT BLD: 35 SEC — SIGNIFICANT CHANGE UP (ref 27–39.2)
B-GLOBULIN SERPL ELPH-MCNC: 1.2 G/DL — HIGH (ref 0.5–1)
BUN SERPL-MCNC: 13 MG/DL — SIGNIFICANT CHANGE UP (ref 10–20)
CALCIUM SERPL-MCNC: 8.2 MG/DL — LOW (ref 8.5–10.1)
CHLORIDE SERPL-SCNC: 102 MMOL/L — SIGNIFICANT CHANGE UP (ref 98–110)
CO2 SERPL-SCNC: 24 MMOL/L — SIGNIFICANT CHANGE UP (ref 17–32)
CREAT SERPL-MCNC: 1 MG/DL — SIGNIFICANT CHANGE UP (ref 0.7–1.5)
E HISTOLYT AB SER-ACNC: NEGATIVE — SIGNIFICANT CHANGE UP
GAMMA GLOBULIN: 1.3 G/DL — SIGNIFICANT CHANGE UP (ref 0.6–1.6)
GLUCOSE SERPL-MCNC: 117 MG/DL — HIGH (ref 70–99)
HCT VFR BLD CALC: 34.1 % — LOW (ref 42–52)
HGB BLD-MCNC: 11.9 G/DL — LOW (ref 14–18)
INR BLD: 1.23 RATIO — SIGNIFICANT CHANGE UP (ref 0.65–1.3)
M-SPIKE: SIGNIFICANT CHANGE UP G/DL (ref 0–0)
MCHC RBC-ENTMCNC: 31.6 PG — HIGH (ref 27–31)
MCHC RBC-ENTMCNC: 34.9 G/DL — SIGNIFICANT CHANGE UP (ref 32–37)
MCV RBC AUTO: 90.7 FL — SIGNIFICANT CHANGE UP (ref 80–94)
MITOCHONDRIA AB SER-ACNC: SIGNIFICANT CHANGE UP
NRBC # BLD: 0 /100 WBCS — SIGNIFICANT CHANGE UP (ref 0–0)
PLATELET # BLD AUTO: 269 K/UL — SIGNIFICANT CHANGE UP (ref 130–400)
POTASSIUM SERPL-MCNC: 3.9 MMOL/L — SIGNIFICANT CHANGE UP (ref 3.5–5)
POTASSIUM SERPL-SCNC: 3.9 MMOL/L — SIGNIFICANT CHANGE UP (ref 3.5–5)
PROT PATTERN SERPL ELPH-IMP: SIGNIFICANT CHANGE UP
PROTHROM AB SERPL-ACNC: 13.3 SEC — HIGH (ref 9.95–12.87)
RBC # BLD: 3.76 M/UL — LOW (ref 4.7–6.1)
RBC # FLD: 14.6 % — HIGH (ref 11.5–14.5)
SODIUM SERPL-SCNC: 138 MMOL/L — SIGNIFICANT CHANGE UP (ref 135–146)
SURGICAL PATHOLOGY STUDY: SIGNIFICANT CHANGE UP
WBC # BLD: 6.03 K/UL — SIGNIFICANT CHANGE UP (ref 4.8–10.8)
WBC # FLD AUTO: 6.03 K/UL — SIGNIFICANT CHANGE UP (ref 4.8–10.8)

## 2018-05-01 RX ORDER — CALCIUM CARBONATE 500(1250)
1 TABLET ORAL DAILY
Qty: 0 | Refills: 0 | Status: DISCONTINUED | OUTPATIENT
Start: 2018-05-01 | End: 2018-05-02

## 2018-05-01 RX ADMIN — Medication 100 MILLIGRAM(S): at 05:43

## 2018-05-01 RX ADMIN — Medication 1 TABLET(S): at 21:50

## 2018-05-01 RX ADMIN — Medication 100 MILLIGRAM(S): at 21:50

## 2018-05-01 RX ADMIN — CEFTRIAXONE 100 GRAM(S): 500 INJECTION, POWDER, FOR SOLUTION INTRAMUSCULAR; INTRAVENOUS at 18:39

## 2018-05-01 NOTE — PROGRESS NOTE ADULT - ASSESSMENT
82M pmh of DM, HTN p/w 1 episode of vomiting and 8 episodes of diarrhea    #Mass within the hepatic dome and imaging consistent with cirrhosis  - Pt to go for liver biopsy with IR today at 11 per d/w [place]; stat PT/PTT pending. restart PO diet afterward  - Heme/Onc following;  CA 19-9 elevated and AFP negative.  - Hepatitis panel, A1A  CEA, ELISEO, Anti-SM, Cerruloplasmin negative'; AMA pending  - CT chest IV contrast showed Scattered focal areas of interstitial septal thickening with small nodularity.   - MRI confirmed 3 liver lesions suggestive of hepatocellular carcinoma vs. Cholangiocarcinoma, 1 possible high grade dysplastic nodule and 1 dysplastic nodule vs vascular shunt  - No ETOH abuse hx, pt/family unaware of dx  - Alk phos still elevated. AST ALT mildly elevated. Will monitor    #Colitis  - Pt s/p colonoscopy; mild patchy areas of inflammation and erosions seen in the ascending colon, moderate in transverse colon and mild in descending colon. Two biopsies taken. Mild diverticulosis found in sigmoid colon, internal hemorrhoids   - GI following, f/u biopsy results  - Tolerating regular diet PO  - C diff negative  - Will continue on ceftriaxone and flagyl     #Anemia  - Vit B12 >1200 and Folic Acid normal  - Heme/ Onc following    #Dysphagia  - CT neck normal soft tissue, also showed disk herniation with mod spinal stenosis    #Cystitis  - suprapubic tenderness resolved. no dysuria  - Ucx showed no growth although U/A showed 6-10 WBC's and few bacteria  - Continue Ceftriaxone    # Diarrhea  - Resolved  - C Diff and Stool cx negative    # JOSE with Electrolyte abnormalities  - Hyponatremia- resolved  - Hyperkalemia- resolved  - JOSE- likely pre-renal, resolved    # DM  - Glucose controlled; will continue to monitor  - c/w FS and insulin    # HTN  - BP stable    GI/DVT PPX  From [place]

## 2018-05-01 NOTE — CHART NOTE - NSCHARTNOTEFT_GEN_A_CORE
Registered Dietitian Note (4C- 21a)    Scheduled f/u. Pt is off the unit for liver biopsy as per RN. RD to f/u w/in 24hrs.

## 2018-05-01 NOTE — PROGRESS NOTE ADULT - PROBLEM SELECTOR PLAN 1
on antbiciotcs
continue with antibiotics, colonoscopy today
continue with antibiotics, colonoscopy today
continue with antibiotics, colonoscopy tomorrow
on antbiciotcs

## 2018-05-01 NOTE — PROGRESS NOTE ADULT - SUBJECTIVE AND OBJECTIVE BOX
JADENRACHEL  82y  [facility] [address] [place] [address] A      Patient is a 82y old  Male who presents with a chief complaint of Diarrhea (21 Apr 2018 00:03)      INTERVAL HPI/OVERNIGHT EVENTS:    no events overnight     REVIEW OF SYSTEMS:  CONSTITUTIONAL: No fever, weight loss, or fatigue  EYES: No eye pain, visual disturbances, or discharge  ENMT:  No difficulty hearing, tinnitus, vertigo; No sinus or throat pain  NECK: No pain or stiffness  BREASTS: No pain, masses, or nipple discharge  RESPIRATORY: No cough, wheezing, chills or hemoptysis; No shortness of breath  CARDIOVASCULAR: No chest pain, palpitations, dizziness, or leg swelling  GASTROINTESTINAL: No abdominal or epigastric pain. No nausea, vomiting, or hematemesis; No diarrhea or constipation. No melena or hematochezia.  GENITOURINARY: No dysuria, frequency, hematuria, or incontinence  NEUROLOGICAL: No headaches, memory loss, loss of strength, numbness, or tremors  SKIN: No itching, burning, rashes, or lesions   LYMPH NODES: No enlarged glands  ENDOCRINE: No heat or cold intolerance; No hair loss  MUSCULOSKELETAL: No joint pain or swelling; No muscle, back, or extremity pain  PSYCHIATRIC: No depression, anxiety, mood swings, or difficulty sleeping  HEME/LYMPH: No easy bruising, or bleeding gums  ALLERY AND IMMUNOLOGIC: No hives or eczema  FAMILY HISTORY:  No pertinent family history in first degree relatives    T(C): 36.3 (05-01-18 @ 04:39), Max: 37.2 (05-01-18 @ 00:12)  HR: 64 (05-01-18 @ 04:39) (58 - 64)  BP: 156/70 (05-01-18 @ 04:39) (156/70 - 177/75)  RR: 18 (05-01-18 @ 04:39) (18 - 18)  SpO2: --  Wt(kg): --Vital Signs Last 24 Hrs  T(C): 36.3 (01 May 2018 04:39), Max: 37.2 (01 May 2018 00:12)  T(F): 97.4 (01 May 2018 04:39), Max: 98.9 (01 May 2018 00:12)  HR: 64 (01 May 2018 04:39) (58 - 64)  BP: 156/70 (01 May 2018 04:39) (156/70 - 177/75)  BP(mean): --  RR: 18 (01 May 2018 04:39) (18 - 18)  SpO2: --    PHYSICAL EXAM:  GENERAL: NAD, well-groomed, well-developed  HEAD:  Atraumatic, Normocephalic  EYES: EOMI, PERRLA, conjunctiva and sclera clear  ENMT: No tonsillar erythema, exudates, or enlargement; Moist mucous membranes, Good dentition, No lesions  NECK: Supple, No JVD, Normal thyroid  NERVOUS SYSTEM:  Alert & Oriented X3, Good concentration; Motor Strength 5/5 B/L upper and lower extremities; DTRs 2+ intact and symmetric  PULM: Clear to auscultation bilaterally  CARDIAC: Regular rate and rhythm; No murmurs, rubs, or gallops  GI: Soft, Nontender, Nondistended; Bowel sounds present  EXTREMITIES:  2+ Peripheral Pulses, No clubbing, cyanosis, or edema  LYMPH: No lymphadenopathy noted  SKIN: No rashes or lesions    Consultant(s) Notes Reviewed:  [x ] YES  [ ] NO  Care Discussed with Consultants/Other Providers [ x] YES  [ ] NO    LABS:                            11.9   6.03  )-----------( 269      ( 01 May 2018 07:41 )             34.1   05-01    138  |  102  |  13  ----------------------------<  117<H>  3.9   |  24  |  1.0    Ca    8.2<L>      01 May 2018 07:41              acetaminophen   Tablet 650 milliGRAM(s) Oral every 6 hours PRN  amLODIPine   Tablet 5 milliGRAM(s) Oral once PRN  bisacodyl 15 milliGRAM(s) Oral every 6 hours PRN  cefTRIAXone   IVPB 2 Gram(s) IV Intermittent every 24 hours  metroNIDAZOLE  IVPB 500 milliGRAM(s) IV Intermittent every 8 hours  ondansetron Injectable 4 milliGRAM(s) IV Push every 6 hours PRN  pantoprazole    Tablet 40 milliGRAM(s) Oral before breakfast  polyethylene glycol/electrolyte Solution. 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

## 2018-05-01 NOTE — PROGRESS NOTE ADULT - ASSESSMENT
81yo Turkish-speaking male with Past Medical History of DM and HTN admitted for nausea/vomiting and multiple episodes of diarrhea secondary to acute colitis.      Diarrhea secondary to acute colitis: Continue IV Rocephin and Flagyl. s/p colonoscopy, symptoms resolved    Liver Cirrhosis: rule out HCC.  Hepatitis B&C viral studies were negative.  Check AFP and CEA.  MRI SHOWS SUSPICIOUS LESIONS ,  , AFP NEGATIVE ,HEPATITIS PANEL NEGATIVE , IR biopsy today  dysphagia -   resolved , ct scan shows no pathology   Acute kidney injury: serum creatinine has recovered from admission. CKD 2  DMII: monitor FS and start Lantus/Lispro if FS >200, will need close outpatient monitoring since hemoglobin a1c is diagnostic for DM and would require medication, will d/c on low dose metformin 500 mg qd and outpatient follow up   GI/DVT prophylaxis  Hypocalcemia  oscal  Anticipate d/c within 24-48hrs need social work due to insurance status

## 2018-05-01 NOTE — PROGRESS NOTE ADULT - SUBJECTIVE AND OBJECTIVE BOX
Interventional Radiology Brief- Operative Note    Procedure:  18G CT guided liver mass biopsy    Pre-Op Diagnosis:  indeterminate liver lesion; no known primary    Post-Op Diagnosis:   same    Attending: Favian    Resident: none    Anesthesia (type):  [ ] General Anesthesia  [ x] Sedation  [ ] Spinal Anesthesia  [x ] Local/Regional    Contrast: none    Estimated Blood Loss: less than 5ml    Condition:   [ ] Critical  [ ] Serious  [x ] Fair   [ ] Good    Findings/Follow up Plan of Care:  -No pneumothorax or significant bleeding  -Gelfoam injected into biopsy bed  -F/U results in 5 days  Specimens Removed:  6 cores  Implants:  none  Complications:  none  Condition/Disposition:    DC to floor    Please call Interventional Radiology [phone] with any questions, concerns, or issues.

## 2018-05-01 NOTE — PROGRESS NOTE ADULT - PROBLEM SELECTOR PLAN 3
resovled
monitor no acute intervention
resovled

## 2018-05-01 NOTE — PROGRESS NOTE ADULT - SUBJECTIVE AND OBJECTIVE BOX
SUBJECTIVE:    Patient is a 82y old Male who presents with a chief complaint of Diarrhea (21 Apr 2018 00:03)    Currently admitted to medicine with the primary diagnosis of Severe sepsis     Today is hospital day 11d. This morning he is resting comfortably in bed. No diarrhea, endorses mild suprapubic pain rating 1-2/10. No dysuria. Pt to go for liver biopsy with IR today at 11, is ok with procedure.  [id number]    PAST MEDICAL & SURGICAL HISTORY  HTN (hypertension)  DM (diabetes mellitus)  UTI (urinary tract infection)  Depression  Cirrhosis  No significant past surgical history    SOCIAL HISTORY:  Negative for smoking/alcohol/drug use.     ALLERGIES:  No Known Allergies    MEDICATIONS:  STANDING MEDICATIONS  cefTRIAXone   IVPB 2 Gram(s) IV Intermittent every 24 hours  metroNIDAZOLE  IVPB 500 milliGRAM(s) IV Intermittent every 8 hours  pantoprazole    Tablet 40 milliGRAM(s) Oral before breakfast  polyethylene glycol/electrolyte Solution. 4000 milliLiter(s) Oral once    PRN MEDICATIONS  acetaminophen   Tablet 650 milliGRAM(s) Oral every 6 hours PRN  amLODIPine   Tablet 5 milliGRAM(s) Oral once PRN  bisacodyl 15 milliGRAM(s) Oral every 6 hours PRN  ondansetron Injectable 4 milliGRAM(s) IV Push every 6 hours PRN    VITALS:   T(F): 97.4  HR: 64  BP: 156/70  RR: 18  SpO2: --    LABS:                        11.6   5.49  )-----------( 278      ( 30 Apr 2018 07:18 )             33.7     05-01    138  |  102  |  13  ----------------------------<  117<H>  3.9   |  24  |  1.0    Ca    8.2<L>      01 May 2018 07:41    Ceruloplasmin, Serum (04.30.18 @ 07:18)    Ceruloplasmin, Serum: 26 mg/dL    Cancer Antigen, GI Ca 19-9: 63.5:     [id number], Serum (04.23.18 @ 07:25)    Alpha-1-Antitrypsin, Serum: 132 mg/dL    Alpha Fetoprotein - Tumor Marker (04.23.18 @ 07:25)    Alpha Fetoprotein - Tumor Marker: 4.7      RADIOLOGY:    PHYSICAL EXAM:  GEN: No acute distress  HEENT: NC/AT  LUNGS: Clear to auscultation bilaterally   HEART: S1/S2 present. RRR.   ABD: Soft, mild suprapubic tenderness, non-distended. Bowel sounds present  EXT: NC/NC/NE/2+PP/CLARKE  NEURO: AAOX3, no focal deficits

## 2018-05-01 NOTE — PROGRESS NOTE ADULT - PROBLEM SELECTOR PLAN 2
will arrange for ir biopsy, hem/onc following
SUSPICIOUS FOR DYSPLASTIC LESION , will follow up gi
SUSPICIOUS FOR DYSPLASTIC LESION , will follow up gi
awaiting mri with liver protocol, liver chemistry workup pending
ir biopsy today
will arrange for ir biopsy, hem/onc following
will arrange for ir biopsy, hem/onc following

## 2018-05-02 ENCOUNTER — TRANSCRIPTION ENCOUNTER (OUTPATIENT)
Age: 83
End: 2018-05-02

## 2018-05-02 VITALS
TEMPERATURE: 97 F | DIASTOLIC BLOOD PRESSURE: 63 MMHG | SYSTOLIC BLOOD PRESSURE: 134 MMHG | HEART RATE: 78 BPM | RESPIRATION RATE: 18 BRPM

## 2018-05-02 LAB
ANION GAP SERPL CALC-SCNC: 13 MMOL/L — SIGNIFICANT CHANGE UP (ref 7–14)
BUN SERPL-MCNC: 13 MG/DL — SIGNIFICANT CHANGE UP (ref 10–20)
CALCIUM SERPL-MCNC: 8.2 MG/DL — LOW (ref 8.5–10.1)
CHLORIDE SERPL-SCNC: 101 MMOL/L — SIGNIFICANT CHANGE UP (ref 98–110)
CO2 SERPL-SCNC: 23 MMOL/L — SIGNIFICANT CHANGE UP (ref 17–32)
CREAT SERPL-MCNC: 0.9 MG/DL — SIGNIFICANT CHANGE UP (ref 0.7–1.5)
GLUCOSE SERPL-MCNC: 165 MG/DL — HIGH (ref 70–99)
HCT VFR BLD CALC: 36.1 % — LOW (ref 42–52)
HGB BLD-MCNC: 12.4 G/DL — LOW (ref 14–18)
MCHC RBC-ENTMCNC: 31.2 PG — HIGH (ref 27–31)
MCHC RBC-ENTMCNC: 34.3 G/DL — SIGNIFICANT CHANGE UP (ref 32–37)
MCV RBC AUTO: 90.7 FL — SIGNIFICANT CHANGE UP (ref 80–94)
NON-GYNECOLOGICAL CYTOLOGY STUDY: SIGNIFICANT CHANGE UP
NRBC # BLD: 0 /100 WBCS — SIGNIFICANT CHANGE UP (ref 0–0)
PLATELET # BLD AUTO: 280 K/UL — SIGNIFICANT CHANGE UP (ref 130–400)
POTASSIUM SERPL-MCNC: 4 MMOL/L — SIGNIFICANT CHANGE UP (ref 3.5–5)
POTASSIUM SERPL-SCNC: 4 MMOL/L — SIGNIFICANT CHANGE UP (ref 3.5–5)
RBC # BLD: 3.98 M/UL — LOW (ref 4.7–6.1)
RBC # FLD: 14.3 % — SIGNIFICANT CHANGE UP (ref 11.5–14.5)
SODIUM SERPL-SCNC: 137 MMOL/L — SIGNIFICANT CHANGE UP (ref 135–146)
WBC # BLD: 7.32 K/UL — SIGNIFICANT CHANGE UP (ref 4.8–10.8)
WBC # FLD AUTO: 7.32 K/UL — SIGNIFICANT CHANGE UP (ref 4.8–10.8)

## 2018-05-02 RX ORDER — SOD SULF/SODIUM/NAHCO3/KCL/PEG
4000 SOLUTION, RECONSTITUTED, ORAL ORAL
Qty: 0 | Refills: 0 | COMMUNITY
Start: 2018-05-02

## 2018-05-02 RX ORDER — PANTOPRAZOLE SODIUM 20 MG/1
1 TABLET, DELAYED RELEASE ORAL
Qty: 30 | Refills: 0 | OUTPATIENT
Start: 2018-05-02 | End: 2018-05-31

## 2018-05-02 RX ORDER — CALCIUM CARBONATE 500(1250)
1 TABLET ORAL
Qty: 0 | Refills: 0 | COMMUNITY
Start: 2018-05-02

## 2018-05-02 RX ORDER — ACETAMINOPHEN 500 MG
2 TABLET ORAL
Qty: 0 | Refills: 0 | DISCHARGE
Start: 2018-05-02

## 2018-05-02 RX ADMIN — Medication 1 TABLET(S): at 12:48

## 2018-05-02 RX ADMIN — PANTOPRAZOLE SODIUM 40 MILLIGRAM(S): 20 TABLET, DELAYED RELEASE ORAL at 07:49

## 2018-05-02 RX ADMIN — Medication 100 MILLIGRAM(S): at 14:39

## 2018-05-02 RX ADMIN — CEFTRIAXONE 100 GRAM(S): 500 INJECTION, POWDER, FOR SOLUTION INTRAMUSCULAR; INTRAVENOUS at 12:48

## 2018-05-02 RX ADMIN — Medication 100 MILLIGRAM(S): at 05:33

## 2018-05-02 NOTE — DISCHARGE NOTE ADULT - HOSPITAL COURSE
81 y/o Azeri speaking male from [place] with a hx of DM, HTN was BIB by family  1 episode of vomiting and 8 episodes of diarrhea. Per daughter at bedside, pt arrived approx. 2 weeks ago and has been eating at home. He suddenly developed diarrhea this AM with nasuea and an episode of vomiting. She states he has had diarrhea in [place] approx. 6 months apart and was told he had viral infection. He has also had UTI in the past. Presently, pt and family denied a known hx of cirrhosis, no ETOH use, blood transfusions, known hx of hepatitis. Pt does attest to weight loss over a course of 1 year although he cannot quantify and also tells me he has poor appetite. He complains of mild dysuria and urinary urgency, but does empty his bladder. Since being admitted to , he did not report further vomiting, but does complain of nausea. He denied fevers, chills, SOB, CP or weakness. CBC showed WBC 16, LFT's mildly elevated, mild no fevers. U/a nitrite positive and WBC 6-10. CT abd pelvis showed . Colitis extending from the cecum to the splenic flexure. Differential   	etiologies include infectious, inflammatory, and ischemic processes.  	2. Pericholecystic inflammation, likely secondary to the colitis.  	3. Cirrhosis. Mass within the hepatic dome as above. Nonemergent MRI with   	and without contrast is recommended to exclude hepatocellular carcinoma.  	4. Evidence of mild pulmonary edema at the lung bases.  	u/s done showed  1.  Nodular hepatic contour, consistent with cirrhosis. . 2.  Nonspecific mild pericholecystic fluid and borderline gallbladder wall thickening. No sonographic evidence for cholecystitis. PT admitted for Sepsis with LActecimia with lactate 4.3. Started on clears, CEftriaxone and flagyl IV. C diff negative. Hepatitis panel negative. GI consulted, rec Abx and clears advance diet as tolerated. Symptoms improved, lactate reduced and diarrhea resolved. Colonoscopy was recommended after symptoms resolved which showed     Liver mass on CT further evaluated with MRI as HCC suspected. Heme Onc consulted. CA 19-9 elevated. Anti-SM, ELISEO, A1A, Anti Mitochondirial Ab, Cerruloplasmin, CEA and AFP negative. Rec staging with CT chest which showed scatter focal areas of interstitial septal thickening. Rec f/u in 8-10 weeks. Liver biopsy recommended by GI and Heme/Onc done by IR. Pt stable for discharge with out patient follow up for liver and colon biopsy results. mild patchy areas of inflammation and erosions seen in the ascending colon, moderate in transverse colon and mild in descending colon. Two biopsies taken. Mild diverticulosis found in sigmoid colon, internal hemorrhoids. Pt tolerated PO diet with no diarrhea and improved abdominal pain.    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 Pt also had Dysphagia which was evaluated by ENT (rec soft diet) and CT neck which was negative for soft tissue obstruction. 81 y/o Hungarian speaking male from [place] with a hx of DM, HTN was BIB by family  1 episode of vomiting and 8 episodes of diarrhea. Per daughter at bedside, pt arrived approx. 2 weeks ago and has been eating at home. He suddenly developed diarrhea this AM with nasuea and an episode of vomiting. She states he has had diarrhea in [place] approx. 6 months apart and was told he had viral infection. He has also had UTI in the past. Presently, pt and family denied a known hx of cirrhosis, no ETOH use, blood transfusions, known hx of hepatitis. Pt does attest to weight loss over a course of 1 year although he cannot quantify and also tells me he has poor appetite. He complains of mild dysuria and urinary urgency, but does empty his bladder. Since being admitted to , he did not report further vomiting, but does complain of nausea. He denied fevers, chills, SOB, CP or weakness. CBC showed WBC 16, LFT's mildly elevated, mild no fevers. U/a nitrite positive and WBC 6-10. CT abd pelvis showed Colitis extending from the cecum to the splenic flexure. Differential   	etiologies include infectious, inflammatory, and ischemic processes. 2. Pericholecystic inflammation, likely secondary to the colitis. 3. Cirrhosis. Mass within the hepatic dome as above. Nonemergent MRI with   	and without contrast is recommended to exclude hepatocellular carcinoma. 4. Evidence of mild pulmonary edema at the lung bases. US abdomen done showed  1.  Nodular hepatic contour, consistent with cirrhosis. . 2.  Nonspecific mild pericholecystic fluid and borderline gallbladder wall thickening. No sonographic evidence for cholecystitis. PT admitted for Sepsis with LActecimia with lactate 4.3. Started on clears, CEftriaxone and flagyl IV. C diff negative. Hepatitis panel negative. GI consulted, rec Abx and clears advance diet as tolerated. Symptoms improved, lactate reduced and diarrhea resolved. Colonoscopy was recommended after symptoms resolved which showed mild patchy areas of inflammation and erosions seen in the ascending colon, moderate in transverse colon and mild in descending colon. Two biopsies taken. Mild diverticulosis found in sigmoid colon, internal hemorrhoids. Pt tolerated procedure well, tolerated PO diet and had no diarrhea and improved abdominal pain.    Liver mass on CT further evaluated with MRI as HCC suspected. Heme Onc consulted. CA 19-9 elevated. Anti-SM, ELISEO, A1A, Anti Mitochondirial Ab, Cerruloplasmin, CEA and AFP negative. Rec staging with CT chest which showed scatter focal areas of interstitial septal thickening. Rec f/u in 8-10 weeks. Liver biopsy recommended by GI and Heme/Onc done by IR. Pt stable for discharge with out patient follow up for liver and colon biopsy results. Pt tolerated PO diet with no diarrhea and improved abdominal pain.    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 Pt also had Dysphagia which was evaluated by ENT (rec soft diet) and CT neck which was negative for soft tissue obstruction. 83 y/o Occitan speaking male from [place] with a hx of DM, HTN was brought in by family following an episode of vomiting and 8 bouts of diarrhea. The patient recently arrived from [place] and was eating at home. His symptoms occurred suddenly prior to admission. His daughter states the patient suffered from diarrhea in [place] approx. 6 months ago and was told he had viral infection. He was treated for urinary tract past. Presently, pt and family denied a known hx of cirrhosis, no ETOH use, blood transfusions, known hx of hepatitis. Pt does attest to weight loss over a course of 1 year although he cannot quantify and also tells me he has poor appetite. He complains of mild dysuria and urinary urgency, but does empty his bladder. Since being admitted to , he did not report further vomiting, but does complain of nausea. He denied fevers, chills, SOB, CP or weakness. CBC showed WBC 16, LFT's mildly elevated, mild no fevers. U/a nitrite positive and WBC 6-10.  CT abd pelvis revealed acute colitis and cirrhosis of unknown etiology.     The patient was admitted for suspected sepsis secondary to acute colitis.  He was treated conservatively with IV antibiotics (Rocephin and Flagyl).  His diarrhea resolved with supportive care.  For finding of cirrhosis, GI was consulted.  A hepatitis panel negative and autoimmune panel was remarkable.  The patient later underwent  colonoscopy which showed mild patchy areas of inflammation and erosions seen in the ascending colon, moderate in transverse colon and mild in descending colon. Two biopsies taken. Mild diverticulosis found in sigmoid colon, internal hemorrhoids. Pt tolerated procedure well, tolerated PO diet and had no diarrhea and improved abdominal pain.    Liver mass on CT further evaluated with MRI as HCC suspected. Heme Onc consulted. CA 19-9 elevated. Anti-SM, ELISEO, A1A, Anti Mitochondirial Ab, Cerruloplasmin, CEA and AFP negative. Rec staging with CT chest which showed scatter focal areas of interstitial septal thickening.  A liver bx was later completed by interventional radiology.  Pt stable for discharge with out patient follow up for liver and colon biopsy results. Pt tolerated PO diet with no diarrhea and improved abdominal pain.    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 Pt also had Dysphagia which was evaluated by ENT (rec soft diet) and CT neck which was negative for soft tissue obstruction.

## 2018-05-02 NOTE — DISCHARGE NOTE ADULT - PROVIDER TOKENS
TOKBERENICE:'[id number]',LATRICE:'[id number]' TOKEN:'[id number]:'[id number]',TOKEN:'[id number]'

## 2018-05-02 NOTE — DISCHARGE NOTE ADULT - CARE PROVIDERS DIRECT ADDRESSES
[email or web address] [facility] [email or web address]

## 2018-05-02 NOTE — DISCHARGE NOTE ADULT - CARE PROVIDER_API CALL
Ciera Quintana), Geriatric Medicine; Internal Medicine  [address] [place] [address]  Phone: [phone]  Fax: [phone]    Charlie Diaz), Gastroenterology; Internal Medicine  [address] [place] [address]  Phone: [phone]  Fax: [phone] Ciera Quintana), Geriatric Medicine; Internal Medicine  [address] [place] [address]  Phone: [phone]  Fax: [phone]    Charlie Diaz), Gastroenterology; Internal Medicine  [address] [place] [address]  Phone: [phone]  Fax: [phone]    Raoul Chisholm), Hematology; Internal Medicine; Medical Oncology  [address] [place] [address]  Phone: [phone]  Fax: [phone]

## 2018-05-02 NOTE — PROGRESS NOTE ADULT - SUBJECTIVE AND OBJECTIVE BOX
SUBJECTIVE:    Patient is a 82y old Male who presents with a chief complaint of Diarrhea (21 Apr 2018 00:03)    Currently admitted to medicine with the primary diagnosis of Severe sepsis     Today is hospital day 12d. This morning he is resting comfortably in bed and reports no new issues or overnight events.     PAST MEDICAL & SURGICAL HISTORY  HTN (hypertension)  DM (diabetes mellitus)  UTI (urinary tract infection)  Depression  Cirrhosis  No significant past surgical history    SOCIAL HISTORY:  Negative for smoking/alcohol/drug use.     ALLERGIES:  No Known Allergies    MEDICATIONS:  STANDING MEDICATIONS  calcium carbonate 1250 mG (OsCal) 1 Tablet(s) Oral daily  cefTRIAXone   IVPB 2 Gram(s) IV Intermittent every 24 hours  metroNIDAZOLE  IVPB 500 milliGRAM(s) IV Intermittent every 8 hours  pantoprazole    Tablet 40 milliGRAM(s) Oral before breakfast  polyethylene glycol/electrolyte Solution. 4000 milliLiter(s) Oral once    PRN MEDICATIONS  acetaminophen   Tablet 650 milliGRAM(s) Oral every 6 hours PRN  amLODIPine   Tablet 5 milliGRAM(s) Oral once PRN  bisacodyl 15 milliGRAM(s) Oral every 6 hours PRN  ondansetron Injectable 4 milliGRAM(s) IV Push every 6 hours PRN    VITALS:   T(F): 99.2  HR: 65  BP: 141/64  RR: 18  SpO2: --    LABS:                        11.9   6.03  )-----------( 269      ( 01 May 2018 07:41 )             34.1     05-01    138  |  102  |  13  ----------------------------<  117<H>  3.9   |  24  |  1.0    Ca    8.2<L>      01 May 2018 07:41      PT/INR - ( 01 May 2018 10:17 )   PT: 13.30 sec;   INR: 1.23 ratio         PTT - ( 01 May 2018 10:17 )  PTT:35.0 sec    CAPILLARY BLOOD GLUCOSE  205 (01 May 2018 20:40)  148 (01 May 2018 16:43)  119 (01 May 2018 12:59)    RADIOLOGY:  < from: CT Neck Soft Tissue w/ IV Cont (04.28.18 @ 21:56) >  Impression:    Unremarkable evaluation of the soft tissues of the neck.    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 Consider MRI of the cervical spine for further evaluation.    < end of copied text >    < from: CT Chest w/ IV Cont (04.27.18 @ 23:18) >  IMPRESSION:    1.  Scattered focal areas of interstitial septal thickening with small   nodularity, indeterminant etiology. No baseline CT chest comparison. With   given history of HCC, suggest short-term follow-up CT chest in 8-10 weeks.  2.  Mild interstitial pulmonary edema.    < end of copied text >    Cancer Antigen, GI Ca 19-9 (04.28.18 @ 05:22)    Cancer Antigen, GI Ca 19-9: 63.5:    Alpha Fetoprotein - Tumor Marker (04.23.18 @ 07:25)    Alpha Fetoprotein - Tumor Marker: 4.7:    Carcinoembryonic Antigen (04.24.18 @ 08:23)    Carcinoembryonic Antigen: 4.9:    Non-smoker: less than 3.9 ng/mL       Smoker: less than 5.5 ng/mL ng/mL    Mitochondrial Antibody (04.30.18 @ 07:18)    Mitochondrial Antibody: <1:20: Method: Indirect fluorescent antibody.        PHYSICAL EXAM:  GEN: No acute distress  LUNGS: Clear to auscultation bilaterally   HEART: S1/S2 present. RRR.   ABD: Soft, non-tender, non-distended. Bowel sounds present  EXT: NC/NC/NE/2+PP/CLARKE  NEURO: AAOX3

## 2018-05-02 NOTE — DISCHARGE NOTE ADULT - PLAN OF CARE
Treat disease and prevent complications Continue medications as prescribed. Follow up with Primary Care Doctor at [facility] clinic appointment for recommendation to Gastroenterology clinic. Continue medications as prescribed. Check blood sugar before meals, at bed time and in the morning. If lightheaded, dizzy or nauseous please check blood sugar or seek medical attention Follow up Biopsy results Go to primary care doctor at [facility] clinic appointment made. They will refer you to Gastroenterology team for further biopsy specimen results. Go to primary care doctor at [facility] clinic appointment made. They will refer you to Oncology (Dr. Chisholm) for further biopsy specimen results resolved treated with IV antibiotics. Follow up with Primary care doctor to evaluate cause. If symptoms return please seek medical attention immediately.

## 2018-05-02 NOTE — DISCHARGE NOTE ADULT - PATIENT PORTAL LINK FT
You can access the [facility] Patient Portal, offered by [facility], by registering with the following website: [email or web address]

## 2018-05-02 NOTE — PROGRESS NOTE ADULT - ASSESSMENT
82M pmh of DM, HTN p/w 1 episode of vomiting and 8 episodes of diarrhea    #Mass within the hepatic dome and imaging consistent with cirrhosis  - s/p liver biopsy; f/u results in 5 days   - Heme/Onc following;  CA 19-9 elevated and AFP negative.  - Hepatitis panel, A1A  CEA, ELISEO, Anti-SM, AMA and Cerruloplasmin negative   - CT chest IV contrast showed Scattered focal areas of interstitial septal thickening with small nodularity.   - MRI confirmed 3 liver lesions suggestive of hepatocellular carcinoma vs. Cholangiocarcinoma, 1 possible high grade dysplastic nodule and 1 dysplastic nodule vs vascular shunt  - No ETOH abuse hx, pt/family unaware of dx  - Alk phos still elevated. AST ALT mildly elevated. Will monitor    #Colitis  - Pt s/p colonoscopy; mild patchy areas of inflammation and erosions seen in the ascending colon, moderate in transverse colon and mild in descending colon. Two biopsies taken. Mild diverticulosis found in sigmoid colon, internal hemorrhoids   - f/u called GI for f/u biopsy results  - Tolerating regular diet PO  - C diff negative  - Started on Ceftriaxone and flagyl 4/20. Will continue. F/u with GI regarding legnth of required therapy    #Anemia  - Vit B12 >1200 and Folic Acid normal  - Heme/ Onc following    #Dysphagia  - CT neck normal soft tissue, also showed disk herniation with mod spinal stenosis    #Cystitis  - suprapubic tenderness resolved. no dysuria  - Ucx showed no growth although U/A showed 6-10 WBC's and few bacteria  - Continue Ceftriaxone    # Diarrhea  - Resolved  - C Diff and Stool cx negative    # JOSE with Electrolyte abnormalities  - Hyponatremia- resolved  - Hyperkalemia- resolved  - JOSE- likely pre-renal, resolved    # DM  - Glucose controlled; will continue to monitor  - c/w FS and insulin    # HTN  - BP stable    GI/DVT PPX  From [place]

## 2018-05-02 NOTE — PROGRESS NOTE ADULT - PROVIDER SPECIALTY LIST ADULT
Gastroenterology
Gastroenterology
Hospitalist
Internal Medicine
Intervent Radiology
ENT
Hospitalist
Internal Medicine

## 2018-05-02 NOTE — DISCHARGE NOTE ADULT - ADDITIONAL INSTRUCTIONS
Follow up at [facility] on May 10th at 1pm. [address]  please call [phone] if any questions. Follow up at [facility] clinic on May 10th at 1pm. [address] for Diabetes management and Gastroenterology referral. please call [phone] if any questions. Follow up with Dr. Chisholm in 1 week to check results of liver biopsy.

## 2018-05-02 NOTE — CHART NOTE - NSCHARTNOTEFT_GEN_A_CORE
Registered Dietitian Follow-Up     Patient Profile Reviewed                           Yes [x]   No []     Nutrition History Previously Obtained        Yes [x]  No []       Pertinent Subjective Information:     Pertinent Medical Interventions:     Diet order:     Anthropometrics:  - Ht.  - Wt.  - %wt change  - BMI  - IBW     Pertinent Lab Data:     Pertinent Meds:     Physical Findings:  - Appearance:  - GI function:  - Tubes:  - Oral/Mouth cavity:  - Skin:     Nutrition Requirements  Weight Used:     Estimated Energy Needs    Continue []  Adjust []  Adjusted Energy Recommendations:   kcal/day        Estimated Protein Needs    Continue []  Adjust []  Adjusted Protein Recommendations:   gm/day        Estimated Fluid Needs        Continue []  Adjust []  Adjusted Fluid Recommendations:   mL/day     Nutrient Intake:        [] Previous Nutrition Diagnosis:            [] Ongoing          [] Resolved    [] No active nutrition diagnosis identified at this time     Nutrition Diagnostic #1  Problem:  Etiology:  Statement:     Nutrition Diagnostic #2  Problem:  Etiology:  Statement:     Nutrition Intervention      Goal/Expected Outcome:     Indicator/Monitoring: Registered Dietitian Follow-Up     Patient Profile Reviewed                           Yes [x]   No []     Nutrition History Previously Obtained        Yes [x]  No []       Pertinent Subjective Information: Spoke to pt daughter at bedside who reports pt consuming around ~50% of meals which is close to baseline intake. Daughter translated for pt who reported appetite is not very good and we discussed supplement options; pt agreeable to try Glucerna.     Pertinent Medical Interventions: Mass within the hepatic dome and imaging consistent with cirrhosis: s/p liver biopsy; f/u results in 5 days. Heme/Onc following. Colitis: s/p colonoscopy; mild patchy areas of inflammation and erosions seen in the ascending colon, moderate in transverse colon and mild in descending colon. Two biopsies taken. Mild diverticulosis found in sigmoid colon, internal hemorrhoids: f/u called GI for f/u biopsy results, tolerating regular diet PO. F/u with GI regarding length of required therapy. Anemia: Vit B12 >1200 and Folic Acid normal. Dysphagia: CT neck normal soft tissue, also showed disk herniation with mod spinal stenosis. Cystitis: suprapubic tenderness resolved. no dysuria. Ucx showed no growth although U/A showed 6-10 WBC's and few bacteria. Continue Ceftriaxone. Diarrhea: resolved, C Diff and Stool cx negative. JOSE with Electrolyte abnormalities: Hyponatremia- resolved, Hyperkalemia- resolved, JOSE- likely pre-renal, resolved.     Diet order: Carbohydrate consistent, evening snack, DASH/TLC      Anthropometrics:  - Ht. 64"  - Wt. no new wts  - wt change - no new wts to assess     Pertinent Lab Data: (5/1) RBC 3.76, H/H 11.9/34.1, glucose 117, Ca 8.2, HgbA1C 8.2, mean plasma glucose 189     Pertinent Meds: calcium carbonate, amlodipine, polyethylene glycol/electrolyte solution, bisacodyl, ondansetron, pantoprazole     Physical Findings:  - Appearance: alert, oriented, Surinamese speaking so daughter answered questions or translated for patient  - GI function: pt denies GI distress today and reports last BM 5/2   - Oral/Mouth cavity: pt reports a little discomfort/soreness in the throat but denies difficulty chewing swallowing   - Skin: intact, BS = 18     Nutrition Requirements  Weight Used: ABW: 148#/67kg    Estimated Calorie Needs: 7099-1475 kcal/day (MSJ x 1.2-1.3 AF)--remains as of 4/24     Estimated Protein Needs: 67-80 gm/day (1-1.2 gm/kg ABW)--remains as of 4/24     Estimated Fluid Needs: 1 ml/kcal     Nutrient Intake: not meeting needs     [x] Previous Nutrition Diagnosis: Inadequate pro-energy intake: improving            [x] Ongoing          [] Resolved    Nutrition Intervention Meals and Snacks, Medical Food Supplements  Continue current diet order and consider order Glucerna Vanilla BID.     Goal/Expected Outcome: Pt to consume >50% of meals, snacks, supplements within 3 days.     Indicator/Monitoring: RD to monitor diet order, energy intake, labs, body composition, nutrition focused physical findings.

## 2018-05-02 NOTE — DISCHARGE NOTE ADULT - SECONDARY DIAGNOSIS.
Type 2 diabetes mellitus without complication, without long-term current use of insulin Liver mass Acute cystitis without hematuria

## 2018-05-02 NOTE — DISCHARGE NOTE ADULT - MEDICATION SUMMARY - MEDICATIONS TO TAKE
I will START or STAY ON the medications listed below when I get home from the hospital:    acetaminophen 325 mg oral tablet  -- 2 tab(s) by mouth every 6 hours, As needed, For Temp greater than 38 C (100.4 F)  -- Indication: For Pain    calcium carbonate 1250 mg (500 mg elemental calcium) oral tablet  -- 1 tab(s) by mouth once a day  -- Indication: For Vitamin supplementation    pantoprazole 40 mg oral delayed release tablet  -- 1 tab(s) by mouth once a day (before a meal)  -- Indication: For GERD

## 2018-05-02 NOTE — PROGRESS NOTE ADULT - ASSESSMENT
81yo Irish-speaking male with Past Medical History of DM and HTN admitted for nausea/vomiting and multiple episodes of diarrhea secondary to acute colitis.    Liver Cirrhosis: rule out HCC.  Hepatitis B&C viral studies were negative.  MRI with liver protocol revealed three distinct liver lesions.  The patient underwent IR guided bx yesterday.  No complications were reported. The patient was instructed to follow-up pathology results as outpatient.     Diarrhea secondary to acute colitis: C.diff PCR was negative.  Symptoms improved from admission.  Discontinue antibiotics upon discharge.    Acute Cystitis: no dysuria or increased frequency reported.  See above for abx management.  Acute kidney injury: serum creatinine has recovered from admission.  DMII: monitor FS with meals.  Continue CHO consistent diet.  Restart Metformin as outpatient  GI/DVT prophylaxis  Discharge home; time spent = 35 minutes

## 2018-05-02 NOTE — PROGRESS NOTE ADULT - SUBJECTIVE AND OBJECTIVE BOX
JADENRACHEL MRN-[id number]    Hospitalist Note  83yo Albanian-speaking male with Past Medical History of DM and HTN admitted for nausea/vomiting and multiple episodes of diarrhea secondary to acute colitis.      Overnight events/Updates: status post colonoscopy and liver biopsy.  Pathology results are pending.  No further diarrhea has been reported.    Vital Signs Last 24 Hrs  T(C): 36 (02 May 2018 12:29), Max: 37.3 (02 May 2018 05:04)  T(F): 96.8 (02 May 2018 12:29), Max: 99.2 (02 May 2018 05:04)  HR: 78 (02 May 2018 12:29) (56 - 78)  BP: 134/63 (02 May 2018 12:29) (134/63 - 168/73)  BP(mean): --  RR: 18 (02 May 2018 12:29) (18 - 18)  SpO2: --    Physical Examination:  General: AAO x 3  HEENT: PERRLA, EOMI  CV= S1 & S2 appreciated  Lungs=CTA BL  Abdominal Examination= + BS, Soft, NT/ND  Extremity Examination= No C/C/E    ROS: No chest pain, no shortness of breath.  All other systems reviewed and are within normal limits except for the complaints in the HPI.    MEDICATIONS  (STANDING):  calcium carbonate 1250 mG (OsCal) 1 Tablet(s) Oral daily  cefTRIAXone   IVPB 2 Gram(s) IV Intermittent every 24 hours  metroNIDAZOLE  IVPB 500 milliGRAM(s) IV Intermittent every 8 hours  pantoprazole    Tablet 40 milliGRAM(s) Oral before breakfast  polyethylene glycol/electrolyte Solution. 4000 milliLiter(s) Oral once    MEDICATIONS  (PRN):  acetaminophen   Tablet 650 milliGRAM(s) Oral every 6 hours PRN For Temp greater than 38 C (100.4 F)  amLODIPine   Tablet 5 milliGRAM(s) Oral once PRN bp >180  bisacodyl 15 milliGRAM(s) Oral every 6 hours PRN Constipation  ondansetron Injectable 4 milliGRAM(s) IV Push every 6 hours PRN Nausea and/or Vomiting                            12.4   7.32  )-----------( 280      ( 02 May 2018 08:17 )             36.1     05-02    137  |  101  |  13  ----------------------------<  165<H>  4.0   |  23  |  0.9    Ca    8.2<L>      02 May 2018 08:17        Case discussed with housestaff & family  BATOOL Smyth [id number]

## 2018-05-02 NOTE — DISCHARGE NOTE ADULT - CARE PLAN
Principal Discharge DX:	Colitis  Goal:	Treat disease and prevent complications  Assessment and plan of treatment:	Continue medications as prescribed. Follow up with Primary Care Doctor at [facility] clinic appointment for recommendation to Gastroenterology clinic.  Secondary Diagnosis:	Type 2 diabetes mellitus without complication, without long-term current use of insulin  Goal:	Treat disease and prevent complications  Assessment and plan of treatment:	Continue medications as prescribed. Check blood sugar before meals, at bed time and in the morning. If lightheaded, dizzy or nauseous please check blood sugar or seek medical attention  Secondary Diagnosis:	Liver mass  Goal:	Follow up Biopsy results  Assessment and plan of treatment:	Go to primary care doctor at [facility] clinic appointment made. They will refer you to Gastroenterology team for further biopsy specimen results. Principal Discharge DX:	Colitis  Goal:	Treat disease and prevent complications  Assessment and plan of treatment:	Continue medications as prescribed. Follow up with Primary Care Doctor at [facility] appointment for recommendation to Gastroenterology clinic.  Secondary Diagnosis:	Type 2 diabetes mellitus without complication, without long-term current use of insulin  Goal:	Treat disease and prevent complications  Assessment and plan of treatment:	Continue medications as prescribed. Check blood sugar before meals, at bed time and in the morning. If lightheaded, dizzy or nauseous please check blood sugar or seek medical attention  Secondary Diagnosis:	Liver mass  Goal:	Follow up Biopsy results  Assessment and plan of treatment:	Go to primary care doctor at [facility] clinic appointment made. They will refer you to Oncology (Dr. Chisholm) for further biopsy specimen results  Secondary Diagnosis:	Acute cystitis without hematuria  Goal:	resolved  Assessment and plan of treatment:	treated with IV antibiotics. Follow up with Primary care doctor to evaluate cause. If symptoms return please seek medical attention immediately.

## 2018-05-03 ENCOUNTER — EMERGENCY (EMERGENCY)
Facility: HOSPITAL | Age: 83
LOS: 1 days | Discharge: HOME | End: 2018-05-03
Attending: EMERGENCY MEDICINE | Admitting: EMERGENCY MEDICINE

## 2018-05-03 VITALS
HEART RATE: 61 BPM | DIASTOLIC BLOOD PRESSURE: 69 MMHG | OXYGEN SATURATION: 98 % | TEMPERATURE: 98 F | RESPIRATION RATE: 18 BRPM | SYSTOLIC BLOOD PRESSURE: 155 MMHG

## 2018-05-03 VITALS
SYSTOLIC BLOOD PRESSURE: 154 MMHG | HEART RATE: 74 BPM | DIASTOLIC BLOOD PRESSURE: 72 MMHG | RESPIRATION RATE: 20 BRPM | TEMPERATURE: 98 F | OXYGEN SATURATION: 99 %

## 2018-05-03 DIAGNOSIS — I10 ESSENTIAL (PRIMARY) HYPERTENSION: ICD-10-CM
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
## 2018-05-03 DIAGNOSIS — T81.4XXA INFECTION FOLLOWING A PROCEDURE, INITIAL ENCOUNTER: ICD-10-CM

## 2018-05-03 DIAGNOSIS — Z79.899 OTHER LONG TERM (CURRENT) DRUG THERAPY: ICD-10-CM

## 2018-05-03 DIAGNOSIS — E11.9 TYPE 2 DIABETES MELLITUS WITHOUT COMPLICATIONS: ICD-10-CM
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
## 2018-05-03 NOTE — ED ADULT TRIAGE NOTE - CHIEF COMPLAINT QUOTE
Patient had a liver biopsy 2 days ago. As per son, patient states having pain to the site and redness.

## 2018-05-03 NOTE — ED PROVIDER NOTE - OBJECTIVE STATEMENT
81 y/o M with PMH DM, depression, HTN, cirrhosis, recent liver mass discovery with liver biopsy 2 days ago presents for redness around band-aid from liver biopsy x1 day, he was concerned for infection. He reports he has not changed the band-aid since the biopsy. He denies tenderness, swelling, warmth, obvious deformity, discharge. He has follow up scheduled this week. Denies CP, palpitations, SOB, back pain, abdominal pain, n/v/d, black or bloody stools, fevers, sweats, chills, HA, trauma, fall, cough, recent travel, recent illness, sick contacts, leg pain/swelling, urinary symptoms. Slovenian speaking only, he would like family at bedside as ad hoc .

## 2018-05-03 NOTE — ED PROVIDER NOTE - ATTENDING CONTRIBUTION TO CARE
Pt's son, Slick is translating.  Language: Tajik.  Deferred the offer of a phone .  83 y/o M here with a small abrasion/skin tear under his bandaid that has been in place for 2 days since a liver biopsy done by IR.  Per son, they have not removed the bandage, but they were worried about infection because Pt has DM.  No fever. No discharge or drainage from the area.  f/u with PCP on May 10th and multiple other f/u appts.  EXAM: well appearing. NAD.  +small skin tear under lateral flap of bandaid with mild erythema of the skin tear.  No surrounding erythema. no ttp.  No discharge or drainage noted. Biopsy site well appearing.  no abd ttp.  P: wound care.

## 2018-05-03 NOTE — ED PROVIDER NOTE - PHYSICAL EXAMINATION
PHYSICAL EXAM:    GENERAL: Alert, appears stated age, well appearing, non-toxic  SKIN: Warm, pink and dry. MMM. RUQ with band-aid covering area of small incision for liver biopsy.  lateral edge of band-aid with skin tear and mild erythema. no discharge, swelling, warmth, TTP.   EYE: Normal lids/conjunctiva  ENT: Normal hearing, patent oropharynx  NECK: +supple. No meningismus  Pulm: Bilateral BS, normal resp effort, no wheezes, stridor, or retractions  CV: RRR, no M/R/G, 2+ pulses throughout  Abd: soft, non-tender, non-distended, no hepatosplenomegaly. no CVA tenderness.   Mskel: no edema  Neuro: AAOx3

## 2018-05-03 NOTE — ED PROVIDER NOTE - MEDICAL DECISION MAKING DETAILS
d/w son changing dressing tomorrow.  soap and water for cleaning. keep covered if dirty. bacitracin as needed, but can also just keep open to air.  Return if it worsens or there is discharge or fever.

## 2018-05-03 NOTE — ED PROVIDER NOTE - NS ED ROS FT
Review of Systems    Constitutional: (-) fever  Cardiovascular: (-) chest pain, (-) syncope  Respiratory: (-) cough, (-) shortness of breath  Gastrointestinal: (-) vomiting, (-) diarrhea  Musculoskeletal: (-) neck pain, (-) back pain  Integumentary: (+) rash, (-) edema  Neurological: (-) headache  Allergic/Immunologic: (-) pruritus

## 2018-05-03 NOTE — ED PROVIDER NOTE - PROGRESS NOTE DETAILS
dressed with bacitracin. Counseled on red flags and to return for them. Counseled on importance of follow up. Patient repeats back instructions. Patient advised that they or their doctor may call [phone] to follow up on the specific results of the tests performed today in the emergency department.   Patient appears well on discharge.

## 2018-05-03 NOTE — ED ADULT NURSE NOTE - OBJECTIVE STATEMENT
pt s/p liver biopsy 2 days ago, band aid in tact to right side of chest. redness appears to be around site under band aid.

## 2018-05-07 PROBLEM — Z00.00 ENCOUNTER FOR PREVENTIVE HEALTH EXAMINATION: Noted: 2018-05-07

## 2018-05-08 DIAGNOSIS — F32.9 MAJOR DEPRESSIVE DISORDER, SINGLE EPISODE, UNSPECIFIED: ICD-10-CM

## 2018-05-08 DIAGNOSIS — C22.0 LIVER CELL CARCINOMA: ICD-10-CM

## 2018-05-08 DIAGNOSIS — D64.9 ANEMIA, UNSPECIFIED: ICD-10-CM

## 2018-05-08 DIAGNOSIS — E87.5 HYPERKALEMIA: ICD-10-CM

## 2018-05-08 DIAGNOSIS — E87.1 HYPO-OSMOLALITY AND HYPONATREMIA: ICD-10-CM

## 2018-05-08 DIAGNOSIS — K55.039 ACUTE (REVERSIBLE) ISCHEMIA OF LARGE INTESTINE, EXTENT UNSPECIFIED: ICD-10-CM

## 2018-05-08 DIAGNOSIS — K57.30 DIVERTICULOSIS OF LARGE INTESTINE WITHOUT PERFORATION OR ABSCESS WITHOUT BLEEDING: ICD-10-CM

## 2018-05-08 DIAGNOSIS — R65.20 SEVERE SEPSIS WITHOUT SEPTIC SHOCK: ICD-10-CM

## 2018-05-08 DIAGNOSIS — E11.22 TYPE 2 DIABETES MELLITUS WITH DIABETIC CHRONIC KIDNEY DISEASE: ICD-10-CM

## 2018-05-08 DIAGNOSIS — R19.7 DIARRHEA, UNSPECIFIED: ICD-10-CM

## 2018-05-08 DIAGNOSIS — I12.9 HYPERTENSIVE CHRONIC KIDNEY DISEASE WITH STAGE 1 THROUGH STAGE 4 CHRONIC KIDNEY DISEASE, OR UNSPECIFIED CHRONIC KIDNEY DISEASE: ICD-10-CM

## 2018-05-08 DIAGNOSIS — N30.00 ACUTE CYSTITIS WITHOUT HEMATURIA: ICD-10-CM

## 2018-05-08 DIAGNOSIS — N17.9 ACUTE KIDNEY FAILURE, UNSPECIFIED: ICD-10-CM

## 2018-05-08 DIAGNOSIS — K64.8 OTHER HEMORRHOIDS: ICD-10-CM

## 2018-05-08 DIAGNOSIS — N18.2 CHRONIC KIDNEY DISEASE, STAGE 2 (MILD): ICD-10-CM

## 2018-05-08 DIAGNOSIS — R13.10 DYSPHAGIA, UNSPECIFIED: ICD-10-CM

## 2018-05-08 DIAGNOSIS — Z80.0 FAMILY HISTORY OF MALIGNANT NEOPLASM OF DIGESTIVE ORGANS: ICD-10-CM

## 2018-05-08 DIAGNOSIS — K74.60 UNSPECIFIED CIRRHOSIS OF LIVER: ICD-10-CM

## 2018-05-08 DIAGNOSIS — E87.2 ACIDOSIS: ICD-10-CM

## 2018-05-08 DIAGNOSIS — Z79.84 LONG TERM (CURRENT) USE OF ORAL HYPOGLYCEMIC DRUGS: ICD-10-CM

## 2018-05-08 DIAGNOSIS — A41.9 SEPSIS, UNSPECIFIED ORGANISM: ICD-10-CM

## 2018-05-09 ENCOUNTER — APPOINTMENT (OUTPATIENT)
Dept: PODIATRY | Facility: CLINIC | Age: 83
End: 2018-05-09

## 2018-05-10 ENCOUNTER — OUTPATIENT (OUTPATIENT)
Dept: OUTPATIENT SERVICES | Facility: HOSPITAL | Age: 83
LOS: 1 days | Discharge: HOME | End: 2018-05-10

## 2018-05-10 ENCOUNTER — APPOINTMENT (OUTPATIENT)
Dept: GERIATRICS | Facility: CLINIC | Age: 83
End: 2018-05-10

## 2018-05-10 ENCOUNTER — APPOINTMENT (OUTPATIENT)
Dept: HEMATOLOGY ONCOLOGY | Facility: CLINIC | Age: 83
End: 2018-05-10

## 2018-05-10 VITALS — WEIGHT: 147 LBS

## 2018-05-10 VITALS
DIASTOLIC BLOOD PRESSURE: 70 MMHG | HEIGHT: 61 IN | HEART RATE: 65 BPM | SYSTOLIC BLOOD PRESSURE: 152 MMHG | BODY MASS INDEX: 27.78 KG/M2

## 2018-05-10 DIAGNOSIS — M54.5 LOW BACK PAIN: ICD-10-CM

## 2018-05-12 DIAGNOSIS — M54.5 LOW BACK PAIN: ICD-10-CM

## 2018-05-12 DIAGNOSIS — C22.9 MALIGNANT NEOPLASM OF LIVER, NOT SPECIFIED AS PRIMARY OR SECONDARY: ICD-10-CM

## 2018-05-12 DIAGNOSIS — E11.9 TYPE 2 DIABETES MELLITUS WITHOUT COMPLICATIONS: ICD-10-CM

## 2018-05-16 ENCOUNTER — OTHER (OUTPATIENT)
Age: 83
End: 2018-05-16

## 2018-05-17 ENCOUNTER — APPOINTMENT (OUTPATIENT)
Dept: GERIATRICS | Facility: CLINIC | Age: 83
End: 2018-05-17

## 2018-05-17 ENCOUNTER — OUTPATIENT (OUTPATIENT)
Dept: OUTPATIENT SERVICES | Facility: HOSPITAL | Age: 83
LOS: 1 days | Discharge: HOME | End: 2018-05-17

## 2018-05-17 VITALS
BODY MASS INDEX: 28.47 KG/M2 | HEIGHT: 60 IN | WEIGHT: 145 LBS | HEART RATE: 66 BPM | SYSTOLIC BLOOD PRESSURE: 128 MMHG | DIASTOLIC BLOOD PRESSURE: 69 MMHG

## 2018-05-17 DIAGNOSIS — M54.5 LOW BACK PAIN: ICD-10-CM

## 2018-05-21 ENCOUNTER — APPOINTMENT (OUTPATIENT)
Dept: HEPATOLOGY | Facility: CLINIC | Age: 83
End: 2018-05-21

## 2018-05-21 ENCOUNTER — OUTPATIENT (OUTPATIENT)
Dept: OUTPATIENT SERVICES | Facility: HOSPITAL | Age: 83
LOS: 1 days | Discharge: HOME | End: 2018-05-21

## 2018-05-21 VITALS — HEIGHT: 60 IN | SYSTOLIC BLOOD PRESSURE: 120 MMHG | DIASTOLIC BLOOD PRESSURE: 68 MMHG

## 2018-05-21 DIAGNOSIS — R94.5 ABNORMAL RESULTS OF LIVER FUNCTION STUDIES: ICD-10-CM

## 2018-05-22 DIAGNOSIS — R74.8 ABNORMAL LEVELS OF OTHER SERUM ENZYMES: ICD-10-CM

## 2018-05-22 DIAGNOSIS — R93.8 ABNORMAL FINDINGS ON DIAGNOSTIC IMAGING OF OTHER SPECIFIED BODY STRUCTURES: ICD-10-CM

## 2018-05-22 LAB
ALBUMIN SERPL ELPH-MCNC: 3.9 G/DL
ALP BLD-CCNC: 363 U/L
ALT SERPL-CCNC: 54 U/L
ANION GAP SERPL CALC-SCNC: 14 MMOL/L
AST SERPL-CCNC: 75 U/L
BILIRUB SERPL-MCNC: 0.3 MG/DL
BUN SERPL-MCNC: 17 MG/DL
CALCIUM SERPL-MCNC: 9.9 MG/DL
CHLORIDE SERPL-SCNC: 99 MMOL/L
CO2 SERPL-SCNC: 29 MMOL/L
CREAT SERPL-MCNC: 1.1 MG/DL
GLUCOSE SERPL-MCNC: 169 MG/DL
HBV SURFACE AB SER QL: NONREACTIVE
POTASSIUM SERPL-SCNC: 5.2 MMOL/L
PROT SERPL-MCNC: 7.5 G/DL
SODIUM SERPL-SCNC: 142 MMOL/L

## 2018-05-23 DIAGNOSIS — E11.9 TYPE 2 DIABETES MELLITUS WITHOUT COMPLICATIONS: ICD-10-CM

## 2018-05-23 DIAGNOSIS — H40.9 UNSPECIFIED GLAUCOMA: ICD-10-CM

## 2018-05-23 DIAGNOSIS — I10 ESSENTIAL (PRIMARY) HYPERTENSION: ICD-10-CM

## 2018-05-23 DIAGNOSIS — K29.70 GASTRITIS, UNSPECIFIED, WITHOUT BLEEDING: ICD-10-CM

## 2018-05-24 ENCOUNTER — OUTPATIENT (OUTPATIENT)
Dept: OUTPATIENT SERVICES | Facility: HOSPITAL | Age: 83
LOS: 1 days | Discharge: HOME | End: 2018-05-24
Payer: SUBSIDIZED

## 2018-05-24 ENCOUNTER — APPOINTMENT (OUTPATIENT)
Dept: HEMATOLOGY ONCOLOGY | Facility: CLINIC | Age: 83
End: 2018-05-24

## 2018-05-24 VITALS
SYSTOLIC BLOOD PRESSURE: 144 MMHG | TEMPERATURE: 96.5 F | WEIGHT: 145 LBS | BODY MASS INDEX: 28.47 KG/M2 | DIASTOLIC BLOOD PRESSURE: 71 MMHG | HEART RATE: 57 BPM | HEIGHT: 60 IN

## 2018-05-24 LAB
ANION GAP SERPL CALC-SCNC: 13 MMOL/L
BUN SERPL-MCNC: 22 MG/DL
CALCIUM SERPL-MCNC: 10 MG/DL
CHLORIDE SERPL-SCNC: 97 MMOL/L
CO2 SERPL-SCNC: 27 MMOL/L
CREAT SERPL-MCNC: 1.1 MG/DL
GLUCOSE SERPL-MCNC: 109 MG/DL
POTASSIUM SERPL-SCNC: 5 MMOL/L
SODIUM SERPL-SCNC: 137 MMOL/L

## 2018-05-25 DIAGNOSIS — K74.60 UNSPECIFIED CIRRHOSIS OF LIVER: ICD-10-CM

## 2018-05-25 DIAGNOSIS — C24.9 MALIGNANT NEOPLASM OF BILIARY TRACT, UNSPECIFIED: ICD-10-CM

## 2018-05-29 ENCOUNTER — OUTPATIENT (OUTPATIENT)
Dept: OUTPATIENT SERVICES | Facility: HOSPITAL | Age: 83
LOS: 1 days | Discharge: HOME | End: 2018-05-29

## 2018-05-29 DIAGNOSIS — C22.9 MALIGNANT NEOPLASM OF LIVER, NOT SPECIFIED AS PRIMARY OR SECONDARY: ICD-10-CM

## 2018-05-31 ENCOUNTER — APPOINTMENT (OUTPATIENT)
Dept: NUTRITION | Facility: CLINIC | Age: 83
End: 2018-05-31

## 2018-05-31 LAB
AST SERPL W P-5'-P-CCNC: 92 IU/L
CHOLEST SERPL-MCNC: 197 MG/DL
COMMENT:: NORMAL
FIBROSIS STAGE SERPL QL: NORMAL
FIBROSURE ALPHA 2 MACROGLOBULINS: 381 MG/DL
FIBROSURE ALT (SGPT): 65 IU/L
FIBROSURE APOLIPOPROTEIN A1: 131 MG/DL
FIBROSURE GGT: 792 IU/L
FIBROSURE HAPTOGLOBIN: 148 MG/DL
FIBROSURE SCORING: NORMAL
FIBROSURE TOTAL BILIRUBIN: 0.2 MG/DL
GLUCOSE SERPL-MCNC: 165 MG/DL
INTERPRETATIONS:: NORMAL
LIVER FIBR SCORE SERPL CALC.FIBROSURE: NORMAL
NASH SCORING: NORMAL
NECROINFLAMMATORY ACT GRADE SERPL QL: NORMAL
NECROINFLAMMATORY ACT SCORE SERPL: NORMAL
SERVICE CMNT-IMP: NORMAL
STEATOSIS GRADE: NORMAL
STEATOSIS GRADING: NORMAL
STEATOSIS SCORE: NORMAL
TRIGL SERPL-MCNC: 184 MG/DL

## 2018-06-04 ENCOUNTER — APPOINTMENT (OUTPATIENT)
Dept: HEMATOLOGY ONCOLOGY | Facility: CLINIC | Age: 83
End: 2018-06-04

## 2018-06-08 ENCOUNTER — APPOINTMENT (OUTPATIENT)
Dept: PODIATRY | Facility: CLINIC | Age: 83
End: 2018-06-08

## 2018-06-14 ENCOUNTER — APPOINTMENT (OUTPATIENT)
Dept: GERIATRICS | Facility: CLINIC | Age: 83
End: 2018-06-14

## 2018-06-25 ENCOUNTER — APPOINTMENT (OUTPATIENT)
Dept: HEPATOLOGY | Facility: CLINIC | Age: 83
End: 2018-06-25

## 2018-07-05 ENCOUNTER — APPOINTMENT (OUTPATIENT)
Dept: GERIATRICS | Facility: CLINIC | Age: 83
End: 2018-07-05

## 2018-07-06 ENCOUNTER — APPOINTMENT (OUTPATIENT)
Dept: GASTROENTEROLOGY | Facility: CLINIC | Age: 83
End: 2018-07-06

## 2018-07-09 ENCOUNTER — APPOINTMENT (OUTPATIENT)
Dept: HEMATOLOGY ONCOLOGY | Facility: CLINIC | Age: 83
End: 2018-07-09

## 2018-07-09 ENCOUNTER — APPOINTMENT (OUTPATIENT)
Dept: HEPATOLOGY | Facility: CLINIC | Age: 83
End: 2018-07-09

## 2018-07-12 ENCOUNTER — EMERGENCY (EMERGENCY)
Facility: HOSPITAL | Age: 83
LOS: 0 days | Discharge: HOME | End: 2018-07-13
Admitting: PHYSICIAN ASSISTANT

## 2018-07-12 VITALS
SYSTOLIC BLOOD PRESSURE: 204 MMHG | RESPIRATION RATE: 18 BRPM | HEART RATE: 63 BPM | OXYGEN SATURATION: 99 % | DIASTOLIC BLOOD PRESSURE: 92 MMHG | TEMPERATURE: 98 F

## 2018-07-12 DIAGNOSIS — E11.9 TYPE 2 DIABETES MELLITUS WITHOUT COMPLICATIONS: ICD-10-CM

## 2018-07-12 DIAGNOSIS — I25.2 OLD MYOCARDIAL INFARCTION: ICD-10-CM

## 2018-07-12 DIAGNOSIS — M79.641 PAIN IN RIGHT HAND: ICD-10-CM

## 2018-07-12 DIAGNOSIS — M79.643 PAIN IN UNSPECIFIED HAND: ICD-10-CM

## 2018-07-12 DIAGNOSIS — M79.642 PAIN IN LEFT HAND: ICD-10-CM

## 2018-07-12 DIAGNOSIS — Z79.899 OTHER LONG TERM (CURRENT) DRUG THERAPY: ICD-10-CM

## 2018-07-12 DIAGNOSIS — L60.0 INGROWING NAIL: ICD-10-CM

## 2018-07-12 DIAGNOSIS — I10 ESSENTIAL (PRIMARY) HYPERTENSION: ICD-10-CM

## 2018-07-12 DIAGNOSIS — Z86.73 PERSONAL HISTORY OF TRANSIENT ISCHEMIC ATTACK (TIA), AND CEREBRAL INFARCTION WITHOUT RESIDUAL DEFICITS: ICD-10-CM

## 2018-07-13 VITALS — SYSTOLIC BLOOD PRESSURE: 191 MMHG | DIASTOLIC BLOOD PRESSURE: 89 MMHG | HEART RATE: 54 BPM

## 2018-07-13 NOTE — ED PROVIDER NOTE - PHYSICAL EXAMINATION
pts MS exam: nontender to palp  temp nl  senstation intact  motor intact  n/v intact  no ingrown toenails at this time

## 2018-07-13 NOTE — ED PROVIDER NOTE - PMH
Cirrhosis    Depression    DM (diabetes mellitus)    HTN (hypertension)    Liver cancer    UTI (urinary tract infection)

## 2018-07-13 NOTE — ED PROVIDER NOTE - OBJECTIVE STATEMENT
pt here from [place], seen there for intermittent hand pain, R>L   dx with arthritis and "circulation" problem despite no testing being done as per daughter  pt has no sxs at this time  requesting referral to specialist  also requesting referral to podiatrist as pt with frequent ingrown toenails, h/o DM  no other sxs, no current issues- seeHPI

## 2018-07-13 NOTE — ED PROVIDER NOTE - PROGRESS NOTE DETAILS
no indication for imaging at this time, will give referral to ortho referral and podiatry  Pt extensively counseled - warning si/sxs d/w pt. Case d/w pt at length including risks vs benefits of different management/treatment options. Pt instructed to return to ed for re-evaluation or f/u with PCP should sxs persist. Pt verbalizes an understanding & agreement with the plan as discussed  **no circulation issues noted or reason to obtain vascular study, but pt and daughter know all reasons to return to ed Discussed with patient the risks of uncontrolled hypertension including MI, CVA, renal disease , PVD and others.  Pt was instructed to follow up with their PCP within 48 hours to re-asses blood pressure.  Pt was also instructed that if a headache, vision change, altered consciousness, severe headache, stroke like symptoms or hematuria develop to return to the Emergency Room.

## 2018-07-17 ENCOUNTER — OUTPATIENT (OUTPATIENT)
Dept: OUTPATIENT SERVICES | Facility: HOSPITAL | Age: 83
LOS: 1 days | Discharge: HOME | End: 2018-07-17

## 2018-07-17 PROBLEM — C22.9 MALIGNANT NEOPLASM OF LIVER, NOT SPECIFIED AS PRIMARY OR SECONDARY: Chronic | Status: ACTIVE | Noted: 2018-07-13

## 2018-07-17 PROBLEM — E11.9 TYPE 2 DIABETES MELLITUS WITHOUT COMPLICATIONS: Chronic | Status: ACTIVE | Noted: 2018-04-21

## 2018-07-17 PROBLEM — I10 ESSENTIAL (PRIMARY) HYPERTENSION: Chronic | Status: ACTIVE | Noted: 2018-04-21

## 2018-07-17 PROBLEM — F32.9 MAJOR DEPRESSIVE DISORDER, SINGLE EPISODE, UNSPECIFIED: Chronic | Status: ACTIVE | Noted: 2018-04-20

## 2018-07-17 PROBLEM — K74.60 UNSPECIFIED CIRRHOSIS OF LIVER: Chronic | Status: ACTIVE | Noted: 2018-04-20

## 2018-07-17 PROBLEM — N39.0 URINARY TRACT INFECTION, SITE NOT SPECIFIED: Chronic | Status: ACTIVE | Noted: 2018-04-20

## 2018-07-26 ENCOUNTER — OUTPATIENT (OUTPATIENT)
Dept: OUTPATIENT SERVICES | Facility: HOSPITAL | Age: 83
LOS: 1 days | Discharge: HOME | End: 2018-07-26

## 2018-07-26 ENCOUNTER — APPOINTMENT (OUTPATIENT)
Dept: PODIATRY | Facility: CLINIC | Age: 83
End: 2018-07-26
Payer: SUBSIDIZED

## 2018-07-26 VITALS
BODY MASS INDEX: 27.88 KG/M2 | HEART RATE: 79 BPM | HEIGHT: 60 IN | SYSTOLIC BLOOD PRESSURE: 179 MMHG | WEIGHT: 142 LBS | DIASTOLIC BLOOD PRESSURE: 75 MMHG

## 2018-07-26 DIAGNOSIS — I83.90 ASYMPTOMATIC VARICOSE VEINS OF UNSPECIFIED LOWER EXTREMITY: ICD-10-CM

## 2018-07-26 PROCEDURE — 11721 DEBRIDE NAIL 6 OR MORE: CPT

## 2018-07-26 PROCEDURE — 99213 OFFICE O/P EST LOW 20 MIN: CPT | Mod: 25

## 2018-07-27 PROBLEM — I83.90 VARICOSE VEIN OF LEG: Status: ACTIVE | Noted: 2018-07-27

## 2018-07-31 DIAGNOSIS — B35.1 TINEA UNGUIUM: ICD-10-CM

## 2018-07-31 DIAGNOSIS — M79.671 PAIN IN RIGHT FOOT: ICD-10-CM

## 2018-07-31 DIAGNOSIS — E11.9 TYPE 2 DIABETES MELLITUS WITHOUT COMPLICATIONS: ICD-10-CM

## 2018-07-31 DIAGNOSIS — M79.672 PAIN IN LEFT FOOT: ICD-10-CM

## 2018-07-31 DIAGNOSIS — I83.90 ASYMPTOMATIC VARICOSE VEINS OF UNSPECIFIED LOWER EXTREMITY: ICD-10-CM

## 2018-08-02 ENCOUNTER — APPOINTMENT (OUTPATIENT)
Dept: PLASTIC SURGERY | Facility: CLINIC | Age: 83
End: 2018-08-02

## 2018-08-02 ENCOUNTER — OTHER (OUTPATIENT)
Age: 83
End: 2018-08-02

## 2018-08-02 ENCOUNTER — LABORATORY RESULT (OUTPATIENT)
Age: 83
End: 2018-08-02

## 2018-08-02 ENCOUNTER — OUTPATIENT (OUTPATIENT)
Dept: OUTPATIENT SERVICES | Facility: HOSPITAL | Age: 83
LOS: 1 days | Discharge: HOME | End: 2018-08-02

## 2018-08-13 RX ORDER — CLOTRIMAZOLE 10 MG/ML
1 SOLUTION TOPICAL TWICE DAILY
Qty: 1 | Refills: 6 | Status: ACTIVE | COMMUNITY
Start: 2018-07-26 | End: 1900-01-01

## 2018-08-31 ENCOUNTER — APPOINTMENT (OUTPATIENT)
Dept: ENDOCRINOLOGY | Facility: CLINIC | Age: 83
End: 2018-08-31

## 2018-09-10 ENCOUNTER — APPOINTMENT (OUTPATIENT)
Dept: PODIATRY | Facility: CLINIC | Age: 83
End: 2018-09-10

## 2018-09-13 ENCOUNTER — APPOINTMENT (OUTPATIENT)
Dept: ENDOCRINOLOGY | Facility: CLINIC | Age: 83
End: 2018-09-13

## 2018-09-27 NOTE — DIETITIAN INITIAL EVALUATION ADULT. - NUTRITIONGOAL OUTCOME1
Detail Level: Simple Nutrition goals in 3 days: pt's diet to be advanced and pt to consume >50% of meals and snacks

## 2018-10-30 ENCOUNTER — APPOINTMENT (OUTPATIENT)
Dept: NEUROLOGY | Facility: CLINIC | Age: 83
End: 2018-10-30

## 2019-01-01 NOTE — ED ADULT NURSE NOTE - PT NEEDS ASSIST
Subjective:    Jennifer Gutierres is a 4 m o  female who is brought in for this well child visit  History provided by: mother and father    Current Issues:  Current concerns: mother has post partum depression ,she has history of depression in past ,was under care of psychiatrist then she stopped going for her therapy ,she wants to make appointment with the same therapist     Well Child Assessment:  History was provided by the mother  Edgardo Perez lives with her mother, father and grandmother  Interval problems include caregiver depression  Nutrition  Types of milk consumed include formula  Formula - Types of formula consumed include cow's milk based  Feedings occur every 1-3 hours  Dental  The patient has no teething symptoms  Tooth eruption is not evident  Elimination  Urination occurs 4-6 times per 24 hours  Bowel movements occur 1-3 times per 24 hours  Stools have a formed consistency  Sleep  The patient sleeps in her crib  Sleep positions include supine  Safety  Home is child-proofed? yes  There is no smoking in the home  Home has working smoke alarms? yes  There is an appropriate car seat in use  Screening  Immunizations are not up-to-date  There are no risk factors for hearing loss  There are no risk factors for anemia  Social  The caregiver enjoys the child  Childcare is provided at child's home  The childcare provider is a parent or relative (mother will start school ,grandmother will take care of her )  Birth History    Birth     Length: 20" (50 8 cm)     Weight: 3345 g (7 lb 6 oz)     HC 34 3 cm (13 5")    Apgar     One: 9     Five: 9    Gestation Age: 36 1/7 wks    Duration of Labor: 2nd: 43m     The following portions of the patient's history were reviewed and updated as appropriate: current medications, past family history, past medical history, past social history and past surgical history      Developmental 2 Months Appropriate     Question Response Comments    Follows visually through range of 90 degrees Yes Yes on 2019 (Age - 8wk)    Lifts head momentarily Yes Yes on 2019 (Age - 8wk)    Social smile Yes Yes on 2019 (Age - 8wk)      Developmental 4 Months Appropriate     Question Response Comments    Gurgles, coos, babbles, or similar sounds Yes Yes on 2019 (Age - 4mo)    Follows parent's movements by turning head from one side to facing directly forward Yes Yes on 2019 (Age - 4mo)    Follows parent's movements by turning head from one side almost all the way to the other side Yes Yes on 2019 (Age - 4mo)    Lifts head off ground when lying prone Yes Yes on 2019 (Age - 4mo)    Lifts head to 39' off ground when lying prone Yes Yes on 2019 (Age - 4mo)    Lifts head to 80' off ground when lying prone Yes Yes on 2019 (Age - 4mo)    Laughs out loud without being tickled or touched Yes Yes on 2019 (Age - 4mo)    Plays with hands by touching them together Yes Yes on 2019 (Age - 4mo)    Will follow parent's movements by turning head all the way from one side to the other Yes Yes on 2019 (Age - 4mo)            Objective:     Growth parameters are noted and are appropriate for age  Wt Readings from Last 1 Encounters:   08/12/19 5 953 kg (13 lb 2 oz) (24 %, Z= -0 70)*     * Growth percentiles are based on WHO (Girls, 0-2 years) data  Ht Readings from Last 1 Encounters:   08/12/19 25 5" (64 8 cm) (87 %, Z= 1 11)*     * Growth percentiles are based on WHO (Girls, 0-2 years) data  42 %ile (Z= -0 20) based on WHO (Girls, 0-2 years) head circumference-for-age based on Head Circumference recorded on 2019 from contact on 2019  Vitals:    08/12/19 1103   Weight: 5 953 kg (13 lb 2 oz)   Height: 25 5" (64 8 cm)   HC: 40 6 cm (16")       Physical Exam   Constitutional: She is active  HENT:   Head: Anterior fontanelle is flat  No cranial deformity or facial anomaly     Right Ear: Tympanic membrane normal    Left Ear: Tympanic membrane normal    Nose: Nose normal  No nasal discharge  Mouth/Throat: Oropharynx is clear  Eyes: Red reflex is present bilaterally  Pupils are equal, round, and reactive to light  Conjunctivae and EOM are normal    Neck: Normal range of motion  Neck supple  Cardiovascular: Normal rate, regular rhythm, S1 normal and S2 normal  Pulses are strong  No murmur heard  Pulmonary/Chest: Effort normal and breath sounds normal    Abdominal: Soft  She exhibits no distension and no mass  There is no hepatosplenomegaly  There is no tenderness  There is no rebound and no guarding  No hernia  Musculoskeletal: Normal range of motion  She exhibits no deformity  Lymphadenopathy:     She has no cervical adenopathy  Neurological: She is alert  She has normal strength  Suck normal    Skin: Skin is warm  No rash noted  Assessment:     Healthy 4 m o  female infant  1  Encounter for immunization  DTAP HIB IPV COMBINED VACCINE IM (PENTACEL)    PNEUMOCOCCAL CONJUGATE VACCINE 13-VALENT LESS THAN 5Y0 IM (PREVNAR 13)    ROTAVIRUS VACCINE PENTAVALENT 3 DOSE ORAL (ROTA TEQ)          Plan:         1  Anticipatory guidance discussed  Specific topics reviewed: add one food at a time every 3-5 days to see if tolerated, avoid cow's milk until 15months of age, avoid infant walkers, avoid potential choking hazards (large, spherical, or coin shaped foods) unit, avoid putting to bed with bottle, avoid small toys (choking hazard), call for decreased feeding, fever, car seat issues, including proper placement, never leave unattended except in crib, risk of falling once learns to roll, safe sleep furniture, smoke detectors and start solids gradually at 4-6 months  2  Development: appropriate for age    1  Immunizations today: per orders  Vaccine Counseling: Discussed with: Ped parent/guardian: mother and father  4  Follow-up visit in 2 months for next well child visit, or sooner as needed  yes

## 2019-01-08 ENCOUNTER — APPOINTMENT (OUTPATIENT)
Dept: PODIATRY | Facility: CLINIC | Age: 84
End: 2019-01-08

## 2019-01-10 ENCOUNTER — APPOINTMENT (OUTPATIENT)
Dept: GERIATRICS | Facility: CLINIC | Age: 84
End: 2019-01-10

## 2019-01-10 ENCOUNTER — OUTPATIENT (OUTPATIENT)
Dept: OUTPATIENT SERVICES | Facility: HOSPITAL | Age: 84
LOS: 1 days | Discharge: HOME | End: 2019-01-10

## 2019-01-10 VITALS
SYSTOLIC BLOOD PRESSURE: 116 MMHG | TEMPERATURE: 97.2 F | BODY MASS INDEX: 28.47 KG/M2 | HEART RATE: 69 BPM | WEIGHT: 145 LBS | DIASTOLIC BLOOD PRESSURE: 62 MMHG | HEIGHT: 60 IN

## 2019-01-10 DIAGNOSIS — G62.9 POLYNEUROPATHY, UNSPECIFIED: ICD-10-CM

## 2019-01-10 DIAGNOSIS — L81.9 DISORDER OF PIGMENTATION, UNSPECIFIED: ICD-10-CM

## 2019-01-10 DIAGNOSIS — C22.9 MALIGNANT NEOPLASM OF LIVER, NOT SPECIFIED AS PRIMARY OR SECONDARY: ICD-10-CM

## 2019-01-10 NOTE — PHYSICAL EXAM
[General Appearance - Alert] : alert [General Appearance - In No Acute Distress] : in no acute distress [Sclera] : the sclera and conjunctiva were normal [PERRL With Normal Accommodation] : pupils were equal in size, round, and reactive to light [Extraocular Movements] : extraocular movements were intact [Normal Oral Mucosa] : normal oral mucosa [No Oral Pallor] : no oral pallor [No Oral Cyanosis] : no oral cyanosis [Outer Ear] : the ears and nose were normal in appearance [Oropharynx] : The oropharynx was normal [Neck Appearance] : the appearance of the neck was normal [Neck Cervical Mass (___cm)] : no neck mass was observed [Jugular Venous Distention Increased] : there was no jugular-venous distention [Thyroid Diffuse Enlargement] : the thyroid was not enlarged [Thyroid Nodule] : there were no palpable thyroid nodules [Auscultation Breath Sounds / Voice Sounds] : lungs were clear to auscultation bilaterally [Heart Rate And Rhythm] : heart rate was normal and rhythm regular [Heart Sounds] : normal S1 and S2 [Heart Sounds Gallop] : no gallops [Murmurs] : no murmurs [Heart Sounds Pericardial Friction Rub] : no pericardial rub [Edema] : there was no peripheral edema [Bowel Sounds] : normal bowel sounds [Abdomen Soft] : soft [Abdomen Tenderness] : non-tender [] : no hepato-splenomegaly [Abdomen Mass (___ Cm)] : no abdominal mass palpated [No CVA Tenderness] : no ~M costovertebral angle tenderness [No Spinal Tenderness] : no spinal tenderness [Abnormal Walk] : normal gait [Nail Clubbing] : no clubbing  or cyanosis of the fingernails [Musculoskeletal - Swelling] : no joint swelling seen [Motor Tone] : muscle strength and tone were normal [Cranial Nerves] : cranial nerves 2-12 were intact [Deep Tendon Reflexes (DTR)] : deep tendon reflexes were 2+ and symmetric [Oriented To Time, Place, And Person] : oriented to person, place, and time [Impaired Insight] : insight and judgment were intact [Affect] : the affect was normal [FreeTextEntry1] : peripheral neuropathy of lower extremities

## 2019-01-10 NOTE — HISTORY OF PRESENT ILLNESS
[de-identified] : 81 yo male presenting for bilateral toe pain and mild discoloration of 1 month duration. Patient's son reports that the patient is able to walk on his toes without any pain if he's barefoot. Reports that the pain has been there for 1 month. Patient has been on chemotherapy for 3 months.

## 2019-01-10 NOTE — ASSESSMENT
[FreeTextEntry1] : 81 yo male presenting for bilateral toe pain and mild discoloration of 1 month duration. \par \par Bilateral toe discoloration + peripheral neuropathy + onychomycosis\par - Gabapentin 300 mg HS\par - Referral for vascular surgery\par - Following with podiatry on Tuesday\par \par DM; HbA1c 7.3\par \par Liver cancer; following with oncologist in [place]\par \par HTN; metoprolol\par \par GERIATRICS ATTENDING; SEEN WITH EVELYN TEAM\par Patient walked in emergently with his son, wanting to talk about significant bilateral toe pain; he actually does have stabbing pains of distal LEs and feet bilat; also when he wears shoes his toes hurt - on PE he has dusky feet and may have vascular compromise of LE but no overt obstruction; also has significant fungal degeneration of toenails of both great toes.  there is also some clear discharge from right great toe; he is to see podiatry tomorrow; will refer to vascular; and also start gabapentin for peripheral neuropathy which may be on basis of DM and also potentially from chemotherapeutic agents; is to see oncology next week.

## 2019-01-11 DIAGNOSIS — B35.1 TINEA UNGUIUM: ICD-10-CM

## 2019-01-11 DIAGNOSIS — L81.9 DISORDER OF PIGMENTATION, UNSPECIFIED: ICD-10-CM

## 2019-01-11 DIAGNOSIS — G62.9 POLYNEUROPATHY, UNSPECIFIED: ICD-10-CM

## 2019-01-11 DIAGNOSIS — I10 ESSENTIAL (PRIMARY) HYPERTENSION: ICD-10-CM

## 2019-01-11 DIAGNOSIS — C22.9 MALIGNANT NEOPLASM OF LIVER, NOT SPECIFIED AS PRIMARY OR SECONDARY: ICD-10-CM

## 2019-01-11 DIAGNOSIS — E11.9 TYPE 2 DIABETES MELLITUS WITHOUT COMPLICATIONS: ICD-10-CM

## 2019-01-14 ENCOUNTER — APPOINTMENT (OUTPATIENT)
Dept: PODIATRY | Facility: CLINIC | Age: 84
End: 2019-01-14
Payer: COMMERCIAL

## 2019-01-14 ENCOUNTER — OUTPATIENT (OUTPATIENT)
Dept: OUTPATIENT SERVICES | Facility: HOSPITAL | Age: 84
LOS: 1 days | Discharge: HOME | End: 2019-01-14

## 2019-01-14 VITALS
DIASTOLIC BLOOD PRESSURE: 61 MMHG | HEART RATE: 69 BPM | SYSTOLIC BLOOD PRESSURE: 129 MMHG | BODY MASS INDEX: 28.47 KG/M2 | WEIGHT: 145 LBS | HEIGHT: 60 IN

## 2019-01-14 PROCEDURE — 99213 OFFICE O/P EST LOW 20 MIN: CPT

## 2019-01-17 ENCOUNTER — APPOINTMENT (OUTPATIENT)
Dept: PODIATRY | Facility: CLINIC | Age: 84
End: 2019-01-17
Payer: SUBSIDIZED

## 2019-01-17 ENCOUNTER — OUTPATIENT (OUTPATIENT)
Dept: OUTPATIENT SERVICES | Facility: HOSPITAL | Age: 84
LOS: 1 days | Discharge: HOME | End: 2019-01-17

## 2019-01-17 DIAGNOSIS — L08.9 LOCAL INFECTION OF THE SKIN AND SUBCUTANEOUS TISSUE, UNSPECIFIED: ICD-10-CM

## 2019-01-17 DIAGNOSIS — I73.9 PERIPHERAL VASCULAR DISEASE, UNSPECIFIED: ICD-10-CM

## 2019-01-17 PROCEDURE — 99212 OFFICE O/P EST SF 10 MIN: CPT

## 2019-01-17 RX ORDER — AMOXICILLIN AND CLAVULANATE POTASSIUM 875; 125 MG/1; MG/1
875-125 TABLET, COATED ORAL
Qty: 28 | Refills: 0 | Status: ACTIVE | COMMUNITY
Start: 2019-01-17 | End: 1900-01-01

## 2019-01-22 NOTE — ASSESSMENT
[FreeTextEntry1] : Patient examined, history and chart reviewed\par Patient examined, history and chart reviewed\par Patient given referral to vascular surgery\par Patient will require nail removal but is at risk for gangrene due to diminished pulses \par patient educated on diabetic foot health and maintenance at length \par Follow up in 2 weeks or PRN\par \par

## 2019-01-22 NOTE — HISTORY OF PRESENT ILLNESS
[FreeTextEntry1] : Patient presents to clinic for B/L ingrown nails \par Patient is with son who states that patient picks at nail beds\par Paitent has notice so yellow drainage coming from nail \par Patient denies NVFC or SOB

## 2019-01-22 NOTE — PHYSICAL EXAM
[General Appearance - Alert] : alert [General Appearance - In No Acute Distress] : in no acute distress [General Appearance - Well-Appearing] : healthy appearing [Abnormal Walk] : normal gait [Nail Clubbing] : no clubbing  or cyanosis of the fingernails [Musculoskeletal - Swelling] : no joint swelling seen [Motor Tone] : muscle strength and tone were normal [Skin Color & Pigmentation] : normal skin color and pigmentation [Skin Turgor] : normal skin turgor [] : no rash [Normal Appearance] : normal in appearance [Normal in Appearance] : normal in appearance [Normal] : normal [Full ROM] : with full range of motion [0] : 0 in the posterior tibialis [1+] : 1+ in the dorsalis pedis [Deep Tendon Reflexes (DTR)] : deep tendon reflexes were 2+ and symmetric [Sensation] : the sensory exam was normal to light touch and pinprick [No Focal Deficits] : no focal deficits [Oriented To Time, Place, And Person] : oriented to person, place, and time [Impaired Insight] : insight and judgment were intact [Affect] : the affect was normal [FreeTextEntry1] : Diminished PEdal Pulses Bilateral D/P / PT   [Tenderness] : not tender [Erythema] : not erythematous [Swelling] : not swollen [Vibration Dec.] : normal vibratory sensation at the level of the toes [Position Sense Dec.] : normal position sense at the level of the toes [Diminished Throughout Right Foot] : normal tactile sensation with monofilament testing throughout right foot [Diminished Throughout Left Foot] : normal tactile sensation with monofilament testing throughout left foot [FreeTextEntry2] : Hallux 1st toe  [FreeTextEntry6] : Hallux first toe

## 2019-01-24 DIAGNOSIS — B35.1 TINEA UNGUIUM: ICD-10-CM

## 2019-01-24 DIAGNOSIS — I73.9 PERIPHERAL VASCULAR DISEASE, UNSPECIFIED: ICD-10-CM

## 2019-01-24 DIAGNOSIS — M79.671 PAIN IN RIGHT FOOT: ICD-10-CM

## 2019-01-24 DIAGNOSIS — M79.672 PAIN IN LEFT FOOT: ICD-10-CM

## 2019-01-27 PROBLEM — L08.9 INFECTION OF RIGHT FOOT: Status: ACTIVE | Noted: 2019-01-17

## 2019-01-27 PROBLEM — I73.9 PVD (PERIPHERAL VASCULAR DISEASE): Status: ACTIVE | Noted: 2019-01-14

## 2019-01-27 NOTE — PROCEDURE
[FreeTextEntry1] : pt was seen and evaluated\par right 1st toe cleansed and applied bacitracin/dsd/kerlix\par local wound care: bactracin/dsd/kerlix daily\par rx'd po Augmentin 875mg for 10 days.\par f/u in 2 weeks \par

## 2019-01-27 NOTE — HISTORY OF PRESENT ILLNESS
[FreeTextEntry1] : A 84 y/o male presents to clinic for right 1st toenail infection started 2 months ago, dr. brooks states that he needs vascular to see for adequate circulation. pt has an appt with vac[facility] 2/6/18 Dr. wei.\par they have been apply some sort of solution daily but cont builds up fluid.\par he has 4/10 pain today.\par \par pt is in chemo therapy for liver disease started 5 months ago.

## 2019-01-27 NOTE — PHYSICAL EXAM
[General Appearance - Alert] : alert [General Appearance - In No Acute Distress] : in no acute distress [Full Pulse] : the pedal pulses are present [Edema] : there was no peripheral edema [Abnormal Walk] : normal gait [Nail Clubbing] : no clubbing  or cyanosis of the fingernails [Motor Tone] : muscle strength and tone were normal [Skin Turgor] : normal skin turgor [] : no rash [Skin Lesions] : no skin lesions [Right Foot Was Examined] : The right foot was examined [Left Foot Was Examined] : The left foot was examined [Normal Appearance] : normal in appearance [Normal] : normal [Normal in Appearance] : normal in appearance [2+] : 2+ in the dorsalis pedis [Deep Tendon Reflexes (DTR)] : deep tendon reflexes were 2+ and symmetric [Sensation] : the sensory exam was normal to light touch and pinprick [No Focal Deficits] : no focal deficits [Oriented To Time, Place, And Person] : oriented to person, place, and time [Impaired Insight] : insight and judgment were intact [Affect] : the affect was normal [Delayed in the Right Toes] : normal in the toes [Delayed in the Left Toes] : normal in the toes [Position Sense Dec.] : normal position sense at the level of the toes [Diminished Throughout Right Foot] : normal tactile sensation with monofilament testing throughout right foot [Diminished Throughout Left Foot] : normal tactile sensation with monofilament testing throughout left foot [FreeTextEntry1] : granular wound base right 1st nail bed,  [FreeTextEntry6] : left hallux absent

## 2019-01-28 ENCOUNTER — APPOINTMENT (OUTPATIENT)
Dept: PODIATRY | Facility: CLINIC | Age: 84
End: 2019-01-28

## 2019-01-31 ENCOUNTER — OUTPATIENT (OUTPATIENT)
Dept: OUTPATIENT SERVICES | Facility: HOSPITAL | Age: 84
LOS: 1 days | Discharge: HOME | End: 2019-01-31

## 2019-01-31 ENCOUNTER — APPOINTMENT (OUTPATIENT)
Dept: PODIATRY | Facility: CLINIC | Age: 84
End: 2019-01-31
Payer: MEDICAID

## 2019-01-31 VITALS
DIASTOLIC BLOOD PRESSURE: 62 MMHG | BODY MASS INDEX: 28.47 KG/M2 | SYSTOLIC BLOOD PRESSURE: 139 MMHG | HEART RATE: 71 BPM | HEIGHT: 60 IN | WEIGHT: 145 LBS

## 2019-01-31 DIAGNOSIS — M79.671 PAIN IN RIGHT FOOT: ICD-10-CM

## 2019-01-31 DIAGNOSIS — I73.9 PERIPHERAL VASCULAR DISEASE, UNSPECIFIED: ICD-10-CM

## 2019-01-31 DIAGNOSIS — B35.1 TINEA UNGUIUM: ICD-10-CM

## 2019-01-31 DIAGNOSIS — M79.672 PAIN IN LEFT FOOT: ICD-10-CM

## 2019-01-31 DIAGNOSIS — L08.9 LOCAL INFECTION OF THE SKIN AND SUBCUTANEOUS TISSUE, UNSPECIFIED: ICD-10-CM

## 2019-01-31 PROCEDURE — 11721 DEBRIDE NAIL 6 OR MORE: CPT | Mod: NC

## 2019-02-05 PROBLEM — B35.1 ONYCHOMYCOSIS: Status: ACTIVE | Noted: 2018-07-26

## 2019-02-05 PROBLEM — M79.671 ACUTE PAIN OF RIGHT FOOT: Status: ACTIVE | Noted: 2018-07-27

## 2019-02-05 PROBLEM — M79.672 ACUTE PAIN OF LEFT FOOT: Status: ACTIVE | Noted: 2018-07-27

## 2019-02-05 NOTE — PHYSICAL EXAM
[General Appearance - Alert] : alert [General Appearance - In No Acute Distress] : in no acute distress [Full Pulse] : the pedal pulses are present [Edema] : there was no peripheral edema [Abnormal Walk] : normal gait [Nail Clubbing] : no clubbing  or cyanosis of the fingernails [Motor Tone] : muscle strength and tone were normal [Skin Turgor] : normal skin turgor [] : no rash [Skin Lesions] : no skin lesions [Right Foot Was Examined] : The right foot was examined [Left Foot Was Examined] : The left foot was examined [Normal Appearance] : normal in appearance [Normal] : normal [Delayed in the Right Toes] : normal in the toes [Normal in Appearance] : normal in appearance [Delayed in the Left Toes] : normal in the toes [2+] : 2+ in the dorsalis pedis [Position Sense Dec.] : normal position sense at the level of the toes [Diminished Throughout Right Foot] : normal tactile sensation with monofilament testing throughout right foot [Diminished Throughout Left Foot] : normal tactile sensation with monofilament testing throughout left foot [FreeTextEntry1] : granular wound base right 1st nail bed,  [FreeTextEntry6] : left hallux absent  [Deep Tendon Reflexes (DTR)] : deep tendon reflexes were 2+ and symmetric [Sensation] : the sensory exam was normal to light touch and pinprick [No Focal Deficits] : no focal deficits [Oriented To Time, Place, And Person] : oriented to person, place, and time [Impaired Insight] : insight and judgment were intact [Affect] : the affect was normal

## 2019-02-05 NOTE — PROCEDURE
[FreeTextEntry1] : Patient examined, history and chart reviewed\par Debridement of all diseased nails x 10\par Patient tolerated procedure well\par patient educated on diabetic foot health and maintenance at length \par Follow up in 3 months or PRN\par \par

## 2019-02-05 NOTE — HISTORY OF PRESENT ILLNESS
[FreeTextEntry1] : GERARDO FRANZ   presents on 01/31/2019  for a Diabetic foot examination, patient was referred by PCP. Patient complains of numbness and pain of both feet at and around nail beds\par and denies an acute injury.  Patient denies NVFC and denies SOB.\par

## 2019-02-06 ENCOUNTER — APPOINTMENT (OUTPATIENT)
Dept: VASCULAR SURGERY | Facility: CLINIC | Age: 84
End: 2019-02-06
Payer: SUBSIDIZED

## 2019-02-06 DIAGNOSIS — E11.59 TYPE 2 DIABETES MELLITUS WITH OTHER CIRCULATORY COMPLICATIONS: ICD-10-CM

## 2019-02-06 PROCEDURE — 99203 OFFICE O/P NEW LOW 30 MIN: CPT

## 2019-02-06 PROCEDURE — 93925 LOWER EXTREMITY STUDY: CPT

## 2019-02-06 RX ORDER — CAPECITABINE 500 MG/1
TABLET, FILM COATED ORAL
Refills: 0 | Status: ACTIVE | COMMUNITY

## 2019-02-06 RX ORDER — DOCUSATE SODIUM 100 MG
TABLET ORAL
Refills: 0 | Status: ACTIVE | COMMUNITY

## 2019-02-06 RX ORDER — PANTOPRAZOLE 40 MG/1
TABLET, DELAYED RELEASE ORAL
Refills: 0 | Status: ACTIVE | COMMUNITY

## 2019-02-07 ENCOUNTER — APPOINTMENT (OUTPATIENT)
Dept: PODIATRY | Facility: CLINIC | Age: 84
End: 2019-02-07

## 2019-02-21 ENCOUNTER — APPOINTMENT (OUTPATIENT)
Dept: ENDOCRINOLOGY | Facility: CLINIC | Age: 84
End: 2019-02-21

## 2019-03-05 ENCOUNTER — OUTPATIENT (OUTPATIENT)
Dept: OUTPATIENT SERVICES | Facility: HOSPITAL | Age: 84
LOS: 1 days | Discharge: HOME | End: 2019-03-05

## 2019-03-12 NOTE — ED PROVIDER NOTE - DATE/TIME 1
Received fax from [facility] regarding Morphine and Fioricet prescriptions as usually goes to the [facility]. Patient also called with questions about delay in the refill. Were sent per Dr Constance Santos to the [facility] by mistake. Spoke with [facility] and they cannot fill today as would have to order. Patient request fill at her usual pharmacy.  Uses for chronic pain for fibromyalgia, back, neck, shoulder and right knee. Will need to reroute the prescription to [facility] on 14th.    Message to Dr Ramos, on call today to ok.     03-May-2018 23:28

## 2019-03-15 DIAGNOSIS — H21.541 POSTERIOR SYNECHIAE (IRIS), RIGHT EYE: ICD-10-CM

## 2019-03-15 DIAGNOSIS — H40.1134 PRIMARY OPEN-ANGLE GLAUCOMA, BILATERAL, INDETERMINATE STAGE: ICD-10-CM

## 2019-03-15 DIAGNOSIS — H26.492 OTHER SECONDARY CATARACT, LEFT EYE: ICD-10-CM

## 2019-03-15 DIAGNOSIS — H25.11 AGE-RELATED NUCLEAR CATARACT, RIGHT EYE: ICD-10-CM

## 2019-03-15 DIAGNOSIS — E11.9 TYPE 2 DIABETES MELLITUS WITHOUT COMPLICATIONS: ICD-10-CM

## 2019-03-20 ENCOUNTER — APPOINTMENT (OUTPATIENT)
Dept: ENDOCRINOLOGY | Facility: CLINIC | Age: 84
End: 2019-03-20

## 2019-04-01 ENCOUNTER — APPOINTMENT (OUTPATIENT)
Dept: PODIATRY | Facility: CLINIC | Age: 84
End: 2019-04-01

## 2019-04-03 ENCOUNTER — APPOINTMENT (OUTPATIENT)
Dept: ENDOCRINOLOGY | Facility: CLINIC | Age: 84
End: 2019-04-03

## 2019-04-04 ENCOUNTER — APPOINTMENT (OUTPATIENT)
Dept: GERIATRICS | Facility: CLINIC | Age: 84
End: 2019-04-04

## 2019-05-08 ENCOUNTER — APPOINTMENT (OUTPATIENT)
Dept: VASCULAR SURGERY | Facility: CLINIC | Age: 84
End: 2019-05-08

## 2019-06-06 ENCOUNTER — APPOINTMENT (OUTPATIENT)
Dept: GERIATRICS | Facility: CLINIC | Age: 84
End: 2019-06-06
Payer: COMMERCIAL

## 2019-06-11 ENCOUNTER — INPATIENT (INPATIENT)
Facility: HOSPITAL | Age: 84
LOS: 0 days | Discharge: HOME | End: 2019-06-12
Attending: INTERNAL MEDICINE | Admitting: INTERNAL MEDICINE
Payer: MEDICAID

## 2019-06-11 VITALS
TEMPERATURE: 97 F | OXYGEN SATURATION: 98 % | RESPIRATION RATE: 18 BRPM | DIASTOLIC BLOOD PRESSURE: 64 MMHG | HEART RATE: 70 BPM | SYSTOLIC BLOOD PRESSURE: 138 MMHG

## 2019-06-11 LAB
ALBUMIN SERPL ELPH-MCNC: 2.6 G/DL — LOW (ref 3.5–5.2)
ALP SERPL-CCNC: 370 U/L — HIGH (ref 30–115)
ALT FLD-CCNC: 40 U/L — SIGNIFICANT CHANGE UP (ref 0–41)
ANION GAP SERPL CALC-SCNC: 9 MMOL/L — SIGNIFICANT CHANGE UP (ref 7–14)
AST SERPL-CCNC: 80 U/L — HIGH (ref 0–41)
BILIRUB SERPL-MCNC: 0.6 MG/DL — SIGNIFICANT CHANGE UP (ref 0.2–1.2)
BUN SERPL-MCNC: 19 MG/DL — SIGNIFICANT CHANGE UP (ref 10–20)
CALCIUM SERPL-MCNC: 8.5 MG/DL — SIGNIFICANT CHANGE UP (ref 8.5–10.1)
CHLORIDE SERPL-SCNC: 106 MMOL/L — SIGNIFICANT CHANGE UP (ref 98–110)
CO2 SERPL-SCNC: 25 MMOL/L — SIGNIFICANT CHANGE UP (ref 17–32)
CREAT SERPL-MCNC: 1 MG/DL — SIGNIFICANT CHANGE UP (ref 0.7–1.5)
GLUCOSE BLDC GLUCOMTR-MCNC: 112 MG/DL — HIGH (ref 70–99)
GLUCOSE SERPL-MCNC: 118 MG/DL — HIGH (ref 70–99)
HCT VFR BLD CALC: 37.2 % — LOW (ref 42–52)
HGB BLD-MCNC: 12.6 G/DL — LOW (ref 14–18)
MCHC RBC-ENTMCNC: 31.2 PG — HIGH (ref 27–31)
MCHC RBC-ENTMCNC: 33.9 G/DL — SIGNIFICANT CHANGE UP (ref 32–37)
MCV RBC AUTO: 92.1 FL — SIGNIFICANT CHANGE UP (ref 80–94)
NRBC # BLD: 0 /100 WBCS — SIGNIFICANT CHANGE UP (ref 0–0)
PLATELET # BLD AUTO: 222 K/UL — SIGNIFICANT CHANGE UP (ref 130–400)
POTASSIUM SERPL-MCNC: 5 MMOL/L — SIGNIFICANT CHANGE UP (ref 3.5–5)
POTASSIUM SERPL-SCNC: 5 MMOL/L — SIGNIFICANT CHANGE UP (ref 3.5–5)
PROT SERPL-MCNC: 7.2 G/DL — SIGNIFICANT CHANGE UP (ref 6–8)
RBC # BLD: 4.04 M/UL — LOW (ref 4.7–6.1)
RBC # FLD: 17.9 % — HIGH (ref 11.5–14.5)
SODIUM SERPL-SCNC: 140 MMOL/L — SIGNIFICANT CHANGE UP (ref 135–146)
WBC # BLD: 6.68 K/UL — SIGNIFICANT CHANGE UP (ref 4.8–10.8)
WBC # FLD AUTO: 6.68 K/UL — SIGNIFICANT CHANGE UP (ref 4.8–10.8)

## 2019-06-11 PROCEDURE — 74177 CT ABD & PELVIS W/CONTRAST: CPT | Mod: 26

## 2019-06-11 PROCEDURE — 71046 X-RAY EXAM CHEST 2 VIEWS: CPT | Mod: 26

## 2019-06-11 PROCEDURE — 99285 EMERGENCY DEPT VISIT HI MDM: CPT

## 2019-06-11 PROCEDURE — 93010 ELECTROCARDIOGRAM REPORT: CPT

## 2019-06-11 PROCEDURE — 93970 EXTREMITY STUDY: CPT | Mod: 26

## 2019-06-11 NOTE — ED PROVIDER NOTE - NS ED ROS FT
Constitutional: (-) fever  Eyes/ENT: (-) blurry vision, (-) epistaxis  Cardiovascular: (-) chest pain, (-) syncope  Respiratory: SOB on exertion  Gastrointestinal: (-) vomiting, (-) diarrhea  Musculoskeletal: (-) neck pain, (-) back pain, (-) joint pain, (+) B/L LE edema  Integumentary: (-) rash, (-) edema  Neurological: (-) headache, (-) altered mental status  Psychiatric: (-) hallucinations  Allergic/Immunologic: (-) pruritus

## 2019-06-11 NOTE — ED PROVIDER NOTE - CLINICAL SUMMARY MEDICAL DECISION MAKING FREE TEXT BOX
Labs with low albumin, elevated alk phos, CXR with poor inspiratory effort, pulm vascular congestion. CT abd with enlarging liver mass, ascites. Will admit for further eval.

## 2019-06-11 NOTE — ED PROVIDER NOTE - OBJECTIVE STATEMENT
82yo man h/o HTN, DM, Cirrhosis, liver CA, had chemo x 1 and then went to [place] x several months, now complains of increasing abdominal distension and LE swelling x last several days. Has been home from [place] x 1 week, has not followed with PMD as yet. No denice abdominal pain, fever, chills, nausea, vomiting. Reporst SOB on exertion but no chest pain.

## 2019-06-11 NOTE — ED PROVIDER NOTE - PHYSICAL EXAMINATION
VITAL SIGNS: I have reviewed nursing notes and confirm.  CONSTITUTIONAL: Frail but nontoxic appearing  SKIN: Skin exam is warm and dry, no acute rash.  HEAD: Normocephalic; atraumatic.  EYES: PERRL, EOM intact; conjunctiva and sclera clear.  ENT: No nasal discharge; airway clear. TMs clear.  NECK: Supple; non tender.  CARD: S1, S2 normal; no murmurs, gallops, or rubs. Regular rate and rhythm.  RESP: No wheezes, rales or rhonchi.  ABD: Soft, distended, minimally tender epigastric area, no peritoneal signs  EXT: 3+peripheral edema to thighs/lower abdomen  LYMPH: No acute cervical adenopathy.  NEURO: Alert, oriented. Grossly unremarkable. No focal deficits.  PSYCH: Cooperative, appropriate.

## 2019-06-11 NOTE — ED ADULT NURSE NOTE - OBJECTIVE STATEMENT
Pt presents to the ED with c/o B/L LE edema and pain x 2 days. PT denies cp/sob, fevers,chills, n/v/d.

## 2019-06-12 ENCOUNTER — TRANSCRIPTION ENCOUNTER (OUTPATIENT)
Age: 84
End: 2019-06-12

## 2019-06-12 VITALS
RESPIRATION RATE: 18 BRPM | TEMPERATURE: 97 F | DIASTOLIC BLOOD PRESSURE: 69 MMHG | HEART RATE: 73 BPM | SYSTOLIC BLOOD PRESSURE: 152 MMHG | OXYGEN SATURATION: 97 %

## 2019-06-12 LAB
ANION GAP SERPL CALC-SCNC: 14 MMOL/L — SIGNIFICANT CHANGE UP (ref 7–14)
APTT BLD: 39.7 SEC — HIGH (ref 27–39.2)
BASOPHILS # BLD AUTO: 0.07 K/UL — SIGNIFICANT CHANGE UP (ref 0–0.2)
BASOPHILS NFR BLD AUTO: 1 % — SIGNIFICANT CHANGE UP (ref 0–1)
BUN SERPL-MCNC: 18 MG/DL — SIGNIFICANT CHANGE UP (ref 10–20)
CALCIUM SERPL-MCNC: 8.4 MG/DL — LOW (ref 8.5–10.1)
CHLORIDE SERPL-SCNC: 106 MMOL/L — SIGNIFICANT CHANGE UP (ref 98–110)
CO2 SERPL-SCNC: 20 MMOL/L — SIGNIFICANT CHANGE UP (ref 17–32)
CREAT SERPL-MCNC: 1.1 MG/DL — SIGNIFICANT CHANGE UP (ref 0.7–1.5)
EOSINOPHIL # BLD AUTO: 0.21 K/UL — SIGNIFICANT CHANGE UP (ref 0–0.7)
EOSINOPHIL NFR BLD AUTO: 3.1 % — SIGNIFICANT CHANGE UP (ref 0–8)
GLUCOSE BLDC GLUCOMTR-MCNC: 119 MG/DL — HIGH (ref 70–99)
GLUCOSE BLDC GLUCOMTR-MCNC: 191 MG/DL — HIGH (ref 70–99)
GLUCOSE SERPL-MCNC: 115 MG/DL — HIGH (ref 70–99)
HCT VFR BLD CALC: 37.7 % — LOW (ref 42–52)
HGB BLD-MCNC: 12.9 G/DL — LOW (ref 14–18)
IMM GRANULOCYTES NFR BLD AUTO: 0.4 % — HIGH (ref 0.1–0.3)
INR BLD: 1.14 RATIO — SIGNIFICANT CHANGE UP (ref 0.65–1.3)
LYMPHOCYTES # BLD AUTO: 1.37 K/UL — SIGNIFICANT CHANGE UP (ref 1.2–3.4)
LYMPHOCYTES # BLD AUTO: 20.4 % — LOW (ref 20.5–51.1)
MCHC RBC-ENTMCNC: 31.2 PG — HIGH (ref 27–31)
MCHC RBC-ENTMCNC: 34.2 G/DL — SIGNIFICANT CHANGE UP (ref 32–37)
MCV RBC AUTO: 91.3 FL — SIGNIFICANT CHANGE UP (ref 80–94)
MONOCYTES # BLD AUTO: 0.74 K/UL — HIGH (ref 0.1–0.6)
MONOCYTES NFR BLD AUTO: 11 % — HIGH (ref 1.7–9.3)
NEUTROPHILS # BLD AUTO: 4.3 K/UL — SIGNIFICANT CHANGE UP (ref 1.4–6.5)
NEUTROPHILS NFR BLD AUTO: 64.1 % — SIGNIFICANT CHANGE UP (ref 42.2–75.2)
NRBC # BLD: 0 /100 WBCS — SIGNIFICANT CHANGE UP (ref 0–0)
PLATELET # BLD AUTO: 229 K/UL — SIGNIFICANT CHANGE UP (ref 130–400)
POTASSIUM SERPL-MCNC: 4.4 MMOL/L — SIGNIFICANT CHANGE UP (ref 3.5–5)
POTASSIUM SERPL-SCNC: 4.4 MMOL/L — SIGNIFICANT CHANGE UP (ref 3.5–5)
PROTHROM AB SERPL-ACNC: 13.1 SEC — HIGH (ref 9.95–12.87)
RBC # BLD: 4.13 M/UL — LOW (ref 4.7–6.1)
RBC # FLD: 18 % — HIGH (ref 11.5–14.5)
SODIUM SERPL-SCNC: 140 MMOL/L — SIGNIFICANT CHANGE UP (ref 135–146)
WBC # BLD: 6.72 K/UL — SIGNIFICANT CHANGE UP (ref 4.8–10.8)
WBC # FLD AUTO: 6.72 K/UL — SIGNIFICANT CHANGE UP (ref 4.8–10.8)

## 2019-06-12 PROCEDURE — 99223 1ST HOSP IP/OBS HIGH 75: CPT | Mod: AI

## 2019-06-12 RX ORDER — FUROSEMIDE 40 MG
1 TABLET ORAL
Qty: 30 | Refills: 0
Start: 2019-06-12 | End: 2019-07-11

## 2019-06-12 RX ORDER — CALCIUM CARBONATE 500(1250)
1 TABLET ORAL DAILY
Refills: 0 | Status: DISCONTINUED | OUTPATIENT
Start: 2019-06-12 | End: 2019-06-12

## 2019-06-12 RX ORDER — SPIRONOLACTONE 25 MG/1
25 TABLET, FILM COATED ORAL DAILY
Refills: 0 | Status: DISCONTINUED | OUTPATIENT
Start: 2019-06-12 | End: 2019-06-12

## 2019-06-12 RX ORDER — FUROSEMIDE 40 MG
20 TABLET ORAL DAILY
Refills: 0 | Status: DISCONTINUED | OUTPATIENT
Start: 2019-06-12 | End: 2019-06-12

## 2019-06-12 RX ORDER — CHLORHEXIDINE GLUCONATE 213 G/1000ML
1 SOLUTION TOPICAL
Refills: 0 | Status: DISCONTINUED | OUTPATIENT
Start: 2019-06-12 | End: 2019-06-12

## 2019-06-12 RX ORDER — PANTOPRAZOLE SODIUM 20 MG/1
40 TABLET, DELAYED RELEASE ORAL
Refills: 0 | Status: DISCONTINUED | OUTPATIENT
Start: 2019-06-12 | End: 2019-06-12

## 2019-06-12 RX ORDER — FUROSEMIDE 40 MG
40 TABLET ORAL
Refills: 0 | Status: DISCONTINUED | OUTPATIENT
Start: 2019-06-12 | End: 2019-06-12

## 2019-06-12 RX ORDER — ENOXAPARIN SODIUM 100 MG/ML
40 INJECTION SUBCUTANEOUS DAILY
Refills: 0 | Status: DISCONTINUED | OUTPATIENT
Start: 2019-06-12 | End: 2019-06-12

## 2019-06-12 RX ADMIN — Medication 1 TABLET(S): at 11:26

## 2019-06-12 RX ADMIN — PANTOPRAZOLE SODIUM 40 MILLIGRAM(S): 20 TABLET, DELAYED RELEASE ORAL at 06:12

## 2019-06-12 RX ADMIN — ENOXAPARIN SODIUM 40 MILLIGRAM(S): 100 INJECTION SUBCUTANEOUS at 11:27

## 2019-06-12 RX ADMIN — Medication 40 MILLIGRAM(S): at 06:12

## 2019-06-12 NOTE — H&P ADULT - NSICDXPASTMEDICALHX_GEN_ALL_CORE_FT
PAST MEDICAL HISTORY:  Cirrhosis     Depression     DM (diabetes mellitus)     HTN (hypertension)     Liver cancer     UTI (urinary tract infection)

## 2019-06-12 NOTE — DISCHARGE NOTE PROVIDER - CARE PROVIDER_API CALL
Ciera Quintana)  Geriatric Medicine; Internal Medicine  [address] [place] [address]  Phone: [phone]  Fax: [phone]  Follow Up Time:     Omer Ren  oncologist  Phone: [phone]  Fax: (   )    -  Follow Up Time:

## 2019-06-12 NOTE — DISCHARGE NOTE PROVIDER - HOSPITAL COURSE
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  He notes having LE swelling over last 3 months but was acutely worse over the last two weeks.  He denied any SOB, fever/chills, abdominal pain.  CT abdomen pelvis showed diffuse abdominopelvic ascites and hepatic imaging consistent with cirrhosis.  Enlarging hepatic mass was also noted.  The patient was started on furosemide and spironolactone.  He wants to continue to follow up with his oncologist Dr. Ren at [facility].  He will have a basic metabolic panel checked Monday due to starting furosemide and spironolactone.  He was also advised to make an appointment with Dr. Ren - oncologist as soon as possible. 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  He notes having LE swelling over last 3 months but was acutely worse over the last two weeks.  He denied any SOB, fever/chills, abdominal pain.  CT abdomen pelvis showed diffuse abdominopelvic ascites and hepatic imaging consistent with cirrhosis.  Enlarging hepatic mass was also noted.  The patient was started on furosemide.  He wants to continue to follow up with his oncologist Dr. Ren at [facility].  He will have a basic metabolic panel checked Monday due to starting furosemide.  He was also advised to make an appointment with Dr. Ren - oncologist as soon as possible.

## 2019-06-12 NOTE — DISCHARGE NOTE PROVIDER - NSDCCPCAREPLAN_GEN_ALL_CORE_FT
PRINCIPAL DISCHARGE DIAGNOSIS  Diagnosis: Liver mass  Assessment and Plan of Treatment: causing ascitic fluid.  Previously diagnosed with cholangiocarcinoma and treated with chemotherapy.  Started on furosmide and spironolactone.  Will follow up with oncologist.  Will have BMP blood test next week and follow up with primary care doctor.      SECONDARY DISCHARGE DIAGNOSES  Diagnosis: Leg edema  Assessment and Plan of Treatment: PRINCIPAL DISCHARGE DIAGNOSIS  Diagnosis: Liver mass  Assessment and Plan of Treatment: causing ascitic fluid.  Previously diagnosed with cholangiocarcinoma and treated with chemotherapy.  Started on furosmide.  Will follow up with oncologist.  Will have BMP blood test next week and follow up with primary care doctor.      SECONDARY DISCHARGE DIAGNOSES  Diagnosis: Leg edema  Assessment and Plan of Treatment: PRINCIPAL DISCHARGE DIAGNOSIS  Diagnosis: Liver mass  Assessment and Plan of Treatment: causing ascitic fluid.  Previously diagnosed with cholangiocarcinoma and treated with chemotherapy.  Started on furosmide.  Will follow up with oncologist.  Will have BMP blood test next week and follow up with primary care doctor.      SECONDARY DISCHARGE DIAGNOSES  Diagnosis: Leg edema  Assessment and Plan of Treatment: started on furosemide 20mg once daily

## 2019-06-12 NOTE — CHART NOTE - NSCHARTNOTEFT_GEN_A_CORE
patient apyrexic, not SOB, no abdominal pain  feeling better after lasix  discussed with patient and daughter he would like to continue cancer treatment with Dr. Ren in [place], does not need oncology consult at [facility]  will switch to oral furosemide (20mg once daily), will hold off on spironolactone given potassium was 5.0 yesterday  advised to get bmp check this coming Monday or Tuesday, prescription given to patient  daughter made appointment with oncologist for this coming Monday  advised to make appointment next week with PMD  advised to return to hospital if abdominal distension worsening, becomes pyrexic or SOB  d/w Dr. Zazueta

## 2019-06-12 NOTE — H&P ADULT - ASSESSMENT
82 year old male with PMH of DM (Not on tx), HTN (Not on tx), Liver cirrhosis d/t DE LA TORRE, Cholangiocarcinoma s/p Chemotherapy (Family unsure of medication) presents with 2 week hx of abdominal distention and b/l LE swelling. Hx was obtained from son at bedside. Pt was dx w/ cholangiocarcinoma at [facility] in 2018 and followed up with Dr. Chisohlm and Dr. Diaz. Dr. Chisholm discusssed chemotherapy options with patient and he stated that he would further discuss with his family and come back. However, pt went to Dr. Thang Ren Oncologist at [facility] in [place] who began him on chemotherapy (for 4 months) and stopped 2 months ago as he began experiencing signfiicant side effects. he then left to [place] for vacation and whilst there he noticed swelling in his abdomen and b/l LExtremities for the past 2 weeks. As per son, pt had PET scan 3 months ago and according to him there was no evidence of mets. Pt denies any fever, chills, sob ,dziziness, confusion, altered sleep, chest pain, vomiting, diarrhea sick contacts.    #b/l LE edema and abdominal ascites possibly d/t underlying cholangiocarcinoma and decompensated liver cirrhosis - MELD 8 & CHILD Latif B (9 points) - stable  - no evidence of sepsis  - will start salt restricted diet  - pt refusing diagnostic tap at this time. Please re-address in the morning for diagnostic tap  - will start salt restricted diet  - will start IV lasix 40 BID  - will get Echo to determine cardiac reserve  - will get Onc eval  - will get LE duplex    #T2DM   - monitor FS and if > 180 start insulin    #HTN - controlled w/o medication      #Activiyi: OOBC  #DIet: DASH  #DVT/GI PPX: Lovenox/protonix  #Code: Full  #CHG: 82 year old male with PMH of DM (Not on tx), HTN (Not on tx), Liver cirrhosis d/t DE LA TORRE, Cholangiocarcinoma s/p Chemotherapy (Family unsure of medication) presents with 2 week hx of abdominal distention and b/l LE swelling. Hx was obtained from son at bedside. Pt was dx w/ cholangiocarcinoma at [facility] in 2018 and followed up with Dr. Chisholm and Dr. Diaz. Dr. Chisholm discusssed chemotherapy options with patient and he stated that he would further discuss with his family and come back. However, pt went to Dr. Thang Ren Oncologist at [facility] in [place] who began him on chemotherapy (for 4 months) and stopped 2 months ago as he began experiencing signfiicant side effects. he then left to [place] for vacation and whilst there he noticed swelling in his abdomen and b/l LExtremities for the past 2 weeks. As per son, pt had PET scan 3 months ago and according to him there was no evidence of mets. Pt denies any fever, chills, sob ,dziziness, confusion, altered sleep, chest pain, vomiting, diarrhea sick contacts.    #b/l LE edema and abdominal ascites possibly d/t underlying cholangiocarcinoma and decompensated liver cirrhosis - MELD 8 & CHILD Latif B (9 points) - stable  - no evidence of sepsis  - will start salt restricted diet  - pt refusing diagnostic tap at this time. Please re-address in the morning for diagnostic tap  - will start salt restricted diet  - will start IV lasix 40 BID  - will get Echo to determine cardiac reserve  - will get Onc eval  - will get LE duplex  - please reach out to [facility] to obtain recent records including pet scan and chemo regimen (attending contact number provided at top)    #T2DM   - monitor FS and if > 180 start insulin    #HTN - controlled w/o medication      #Activiyi: OOBC  #DIet: DASH  #DVT/GI PPX: Lovenox/protonix  #Code: Full  #CHG:

## 2019-06-12 NOTE — DISCHARGE NOTE PROVIDER - CARE PROVIDERS DIRECT ADDRESSES
[email or web address]

## 2019-06-12 NOTE — DISCHARGE NOTE PROVIDER - PROVIDER TOKENS
PROVIDER:[TOKEN:[[id number]]],FREE:[LAST:[Mikal],FIRST:[Omer],PHONE:[phone]],FAX:[(   )    -],ADDRESS:[oncologist]]

## 2019-06-12 NOTE — H&P ADULT - HISTORY OF PRESENT ILLNESS
82 year old male with PMH of DM (Not on tx), HTN (Not on tx), Liver cirrhosis d/t DE LA TORRE, Cholangiocarcinoma s/p Chemotherapy (Family unsure of medication) presents with 2 week hx of abdominal distention and b/l LE swelling. Hx was obtained from son at bedside. Pt was dx w/ cholangiocarcinoma at [facility] in 2018 and followed up with Dr. Chisholm and Dr. Diaz. Dr. Chisholm discusssed chemotherapy options with patient and he stated that he would further discuss with his family and come back. However, pt went to Dr. Thang Ren Oncologist at [facility] in [place] who began him on chemotherapy (for 4 months) and stopped 2 months ago as he began experiencing signfiicant side effects. he then left to [place] for vacation and whilst there he noticed swelling in his abdomen and b/l LExtremities for the past 2 weeks. As per son, pt had PET scan 3 months ago and according to him there was no evidence of mets. Pt denies any fever, chills, sob ,dziziness, confusion, altered sleep, chest pain, vomiting, diarrhea sick contacts.

## 2019-06-12 NOTE — H&P ADULT - ATTENDING COMMENTS
Patient seen and examined independently. Agree with resident note/ history / physical exam and plan of care with following exceptions/additions/updates. Case discussed with house-staff, nursing and patient/pt decision maker.   pt has no pain or sob at this time, no fever. leg swelling and abd distension are not new.   pt is still on active treatment for his cholangiocarcinoma,   no need for urgent paracentesis.   add Lasix  and spironolactone  and monitor labs ( bun , cr ) as outpt with PMD Dr Quintana.   may dc home and follow up with oncology as outpt.

## 2019-06-12 NOTE — DISCHARGE NOTE NURSING/CASE MANAGEMENT/SOCIAL WORK - NSDCDPATPORTLINK_GEN_ALL_CORE
You can access the [email or web address] Patient Portal, offered by [facility], by registering with the following website: [email or web address]

## 2019-06-13 ENCOUNTER — OUTPATIENT (OUTPATIENT)
Dept: OUTPATIENT SERVICES | Facility: HOSPITAL | Age: 84
LOS: 1 days | Discharge: HOME | End: 2019-06-13

## 2019-06-13 ENCOUNTER — APPOINTMENT (OUTPATIENT)
Dept: GERIATRICS | Facility: CLINIC | Age: 84
End: 2019-06-13
Payer: COMMERCIAL

## 2019-06-13 VITALS
TEMPERATURE: 97.6 F | HEIGHT: 60 IN | SYSTOLIC BLOOD PRESSURE: 132 MMHG | HEART RATE: 89 BPM | WEIGHT: 148 LBS | DIASTOLIC BLOOD PRESSURE: 81 MMHG | BODY MASS INDEX: 29.06 KG/M2

## 2019-06-13 DIAGNOSIS — C22.1 INTRAHEPATIC BILE DUCT CARCINOMA: ICD-10-CM

## 2019-06-13 DIAGNOSIS — I10 ESSENTIAL (PRIMARY) HYPERTENSION: ICD-10-CM

## 2019-06-13 DIAGNOSIS — Z00.00 ENCOUNTER FOR GENERAL ADULT MEDICAL EXAMINATION W/OUT ABNORMAL FINDINGS: ICD-10-CM

## 2019-06-13 DIAGNOSIS — R60.0 LOCALIZED EDEMA: ICD-10-CM

## 2019-06-13 DIAGNOSIS — E11.9 TYPE 2 DIABETES MELLITUS W/OUT COMPLICATIONS: ICD-10-CM

## 2019-06-13 PROCEDURE — 99214 OFFICE O/P EST MOD 30 MIN: CPT

## 2019-06-13 RX ORDER — METOPROLOL TARTRATE 100 MG/1
100 TABLET, FILM COATED ORAL TWICE DAILY
Qty: 60 | Refills: 5 | Status: DISCONTINUED | COMMUNITY
Start: 1900-01-01 | End: 2019-06-13

## 2019-06-13 RX ORDER — FUROSEMIDE 20 MG/1
20 TABLET ORAL DAILY
Qty: 30 | Refills: 5 | Status: ACTIVE | COMMUNITY
Start: 2019-06-13 | End: 1900-01-01

## 2019-06-13 RX ORDER — METFORMIN HYDROCHLORIDE 850 MG/1
850 TABLET, COATED ORAL TWICE DAILY
Qty: 60 | Refills: 5 | Status: DISCONTINUED | COMMUNITY
Start: 1900-01-01 | End: 2019-06-13

## 2019-06-13 RX ORDER — GABAPENTIN 300 MG/1
300 CAPSULE ORAL
Qty: 30 | Refills: 5 | Status: ACTIVE | COMMUNITY
Start: 2019-01-10 | End: 1900-01-01

## 2019-06-13 RX ORDER — ONDANSETRON 8 MG/1
8 TABLET, ORALLY DISINTEGRATING ORAL DAILY
Qty: 30 | Refills: 5 | Status: ACTIVE | COMMUNITY
Start: 1900-01-01 | End: 1900-01-01

## 2019-06-13 NOTE — HISTORY OF PRESENT ILLNESS
[de-identified] : 81 yo Tamazight speaking male ( Translated by Abran) with PMH of DM, Hypertension, cholangiocarcinoma on chemotherapy with  presented after hospital discharge on 6/11. He recently travelled to [place] in between chemotherapy sessions and returned with lower extremity edema and abdominal distention was admitted to hospital. No evidence of DVT. Patient refused paracentesis so was discharged on furosemide and follow up with  for cancer treatment. \par \par c/o cough productive and fever since his trip in [place]. Treated with cetazidime, nebs and symbicort in [place]. Still c/o shortness of breath on exertion.\par c/o nausea occasionally few times a week.\par c/o back pain that radiates to ribs.

## 2019-06-13 NOTE — PHYSICAL EXAM
[General Appearance - Alert] : alert [General Appearance - In No Acute Distress] : in no acute distress [Sclera] : the sclera and conjunctiva were normal [Extraocular Movements] : extraocular movements were intact [PERRL With Normal Accommodation] : pupils were equal in size, round, and reactive to light [Normal Oral Mucosa] : normal oral mucosa [No Oral Cyanosis] : no oral cyanosis [No Oral Pallor] : no oral pallor [Oropharynx] : The oropharynx was normal [Neck Appearance] : the appearance of the neck was normal [Outer Ear] : the ears and nose were normal in appearance [Neck Cervical Mass (___cm)] : no neck mass was observed [Thyroid Diffuse Enlargement] : the thyroid was not enlarged [Jugular Venous Distention Increased] : there was no jugular-venous distention [Heart Sounds] : normal S1 and S2 [Heart Rate And Rhythm] : heart rate was normal and rhythm regular [Thyroid Nodule] : there were no palpable thyroid nodules [Heart Sounds Gallop] : no gallops [Heart Sounds Pericardial Friction Rub] : no pericardial rub [Murmurs] : no murmurs [No Spinal Tenderness] : no spinal tenderness [No CVA Tenderness] : no ~M costovertebral angle tenderness [Musculoskeletal - Swelling] : no joint swelling seen [Nail Clubbing] : no clubbing  or cyanosis of the fingernails [Abnormal Walk] : normal gait [Deep Tendon Reflexes (DTR)] : deep tendon reflexes were 2+ and symmetric [Motor Tone] : muscle strength and tone were normal [Sensation] : the sensory exam was normal to light touch and pinprick [No Focal Deficits] : no focal deficits [FreeTextEntry1] : distention of abdomen

## 2019-06-13 NOTE — REVIEW OF SYSTEMS
[Negative] : Endocrine [Abdominal Pain] : abdominal pain [Vomiting] : vomiting [FreeTextEntry7] : Nausea

## 2019-06-13 NOTE — ASSESSMENT
[FreeTextEntry1] : 83 yo male with cholangiocarcinoma, DM, hypertension presenting for bilateral LE edema and ascites\par \par #Ascites and LE edema\par Likely secondary to Cirrhosis and Cholangiocarcinoma\par Patient refused paracentesis at hospital yesterday \par c/w lasix 20mg\par \par #Cholangiocarcinoma; following with oncologist  at [place] [facility] and not responding to chemotherapy.\par Plan is to start cancer directed therapy as per the patient's family\par \par #DM; HbA1c 7.3 from Aug 2018\par -As per the patient, Metformin was stopped by \par -repeat HbA1c ordered\par \par #HTN; Stopped metoprolol as BP is low from Cirrhosis and cancer\par Currently BP well controlled\par \par #HCM\par RTC in 3 months and PRN\par \par GERIATRICS ATTENDING: SEEN WITH EVELYN TEAM\par Seen by resident team with me; here with daughter; was in ED yesterday - for increasing LE and abdominal girth/edema; he goes to  oncologist Dr. Ren in [place]; now off beta blocker and metformin; when recently in [place] had pneumonia\par Yesterday in Ed CXR benign and LE duplex US negative for DVT\par We sent over order for furosemide low dose at 20mg daily\par Will get BMP and A1c to assess diabetic parameters\par Counseled and reassured patient and daughter.\par reiterated to daughter advanced nature of his current cirrhosis and malignancy. RTC 10 weeks.

## 2019-06-17 DIAGNOSIS — R60.0 LOCALIZED EDEMA: ICD-10-CM

## 2019-06-17 DIAGNOSIS — Z00.00 ENCOUNTER FOR GENERAL ADULT MEDICAL EXAMINATION WITHOUT ABNORMAL FINDINGS: ICD-10-CM

## 2019-06-17 DIAGNOSIS — I10 ESSENTIAL (PRIMARY) HYPERTENSION: ICD-10-CM

## 2019-06-17 DIAGNOSIS — E11.9 TYPE 2 DIABETES MELLITUS WITHOUT COMPLICATIONS: ICD-10-CM

## 2019-06-17 DIAGNOSIS — C22.1 INTRAHEPATIC BILE DUCT CARCINOMA: ICD-10-CM

## 2019-06-19 DIAGNOSIS — C22.1 INTRAHEPATIC BILE DUCT CARCINOMA: ICD-10-CM

## 2019-06-19 DIAGNOSIS — K74.60 UNSPECIFIED CIRRHOSIS OF LIVER: ICD-10-CM

## 2019-06-19 DIAGNOSIS — R60.0 LOCALIZED EDEMA: ICD-10-CM

## 2019-06-19 DIAGNOSIS — K75.81 NONALCOHOLIC STEATOHEPATITIS (NASH): ICD-10-CM

## 2019-06-19 DIAGNOSIS — R18.8 OTHER ASCITES: ICD-10-CM

## 2019-06-26 LAB
25(OH)D3 SERPL-MCNC: 46 NG/ML
ALBUMIN SERPL ELPH-MCNC: 2.5 G/DL
ALP BLD-CCNC: 335 U/L
ALT SERPL-CCNC: 40 U/L
ANION GAP SERPL CALC-SCNC: 9 MMOL/L
AST SERPL-CCNC: 85 U/L
BASOPHILS # BLD AUTO: 0.05 K/UL
BASOPHILS NFR BLD AUTO: 0.9 %
BILIRUB SERPL-MCNC: 0.7 MG/DL
BUN SERPL-MCNC: 22 MG/DL
CALCIUM SERPL-MCNC: 8.5 MG/DL
CHLORIDE SERPL-SCNC: 101 MMOL/L
CHOLEST SERPL-MCNC: 130 MG/DL
CHOLEST/HDLC SERPL: 3.7 RATIO
CO2 SERPL-SCNC: 28 MMOL/L
CREAT SERPL-MCNC: 1.1 MG/DL
EOSINOPHIL # BLD AUTO: 0.15 K/UL
EOSINOPHIL NFR BLD AUTO: 2.7 %
ESTIMATED AVERAGE GLUCOSE: 128 MG/DL
GLUCOSE SERPL-MCNC: 113 MG/DL
HBA1C MFR BLD HPLC: 6.1 %
HCT VFR BLD CALC: 35.4 %
HDLC SERPL-MCNC: 35 MG/DL
HGB BLD-MCNC: 12.2 G/DL
IMM GRANULOCYTES NFR BLD AUTO: 0.2 %
LDLC SERPL CALC-MCNC: 89 MG/DL
LYMPHOCYTES # BLD AUTO: 1.11 K/UL
LYMPHOCYTES NFR BLD AUTO: 20.3 %
MAN DIFF?: NORMAL
MCHC RBC-ENTMCNC: 31.4 PG
MCHC RBC-ENTMCNC: 34.5 G/DL
MCV RBC AUTO: 91.2 FL
MONOCYTES # BLD AUTO: 0.71 K/UL
MONOCYTES NFR BLD AUTO: 13 %
NEUTROPHILS # BLD AUTO: 3.43 K/UL
NEUTROPHILS NFR BLD AUTO: 62.9 %
PLATELET # BLD AUTO: 221 K/UL
POTASSIUM SERPL-SCNC: 4.8 MMOL/L
PROT SERPL-MCNC: 7 G/DL
RBC # BLD: 3.88 M/UL
RBC # FLD: 17.5 %
SODIUM SERPL-SCNC: 138 MMOL/L
TRIGL SERPL-MCNC: 64 MG/DL
TSH SERPL-ACNC: 11.3 UIU/ML
WBC # FLD AUTO: 5.46 K/UL

## 2019-06-26 RX ORDER — LEVOTHYROXINE SODIUM 0.03 MG/1
25 TABLET ORAL DAILY
Qty: 30 | Refills: 6 | Status: ACTIVE | COMMUNITY
Start: 2019-06-26 | End: 1900-01-01

## 2019-10-07 NOTE — CONSULT LETTER
Outpatient Medications Marked as Taking for the 10/7/19 encounter (Refill) with Hannah Silva MD   Medication Sig Dispense Refill   • gabapentin (NEURONTIN) 800 MG tablet Take 1 tablet by mouth 3 times daily. 90 tablet 0   • busPIRone (BUSPAR) 10 MG tablet Take 1 tablet by mouth 3 times daily. 90 tablet 1        Ok to leave detailed Message: Yes  Informed caller of refill policy- 24-48 hours: Yes  No call back needed unless nurse has questions.     Pharmacy: [facility]     Patient has a follow up appointment on 11/27/19   [Dear  ___] : Dear  [unfilled], [Consult Letter:] : I had the pleasure of evaluating your patient, [unfilled]. [Please see my note below.] : Please see my note below. [FreeTextEntry2] : Dear Dr. Niall Gayle

## 2019-12-21 NOTE — DISCHARGE NOTE ADULT - NS MD DC PLAN IMMU FLU PROVIDE INFO
Clear bilaterally, pupils equal, round and reactive to light.
Risks/benefits discussed with patient or patient surrogate

## 2020-11-25 NOTE — CHART NOTE - NSCHARTNOTESELECT_GEN_ALL_CORE
Event Note Quality 110: Preventive Care And Screening: Influenza Immunization: Influenza Immunization previously received during influenza season Quality 130: Documentation Of Current Medications In The Medical Record: Current Medications Documented Detail Level: Detailed

## 2021-01-20 NOTE — ED PROVIDER NOTE - NS ED ROS FT
Quality 110: Preventive Care And Screening: Influenza Immunization: Influenza Immunization Administered during Influenza season Detail Level: Detailed Constitutional: no fever, chills, no recent weight loss, change in appetite or malaise  Eyes: no redness/discharge/pain/vision changes  ENT: no rhinorrhea/ear pain/sore throat  Cardiac: No chest pain, SOB or edema.  Respiratory: No cough or respiratory distress  GI: No nausea, vomiting, diarrhea or abdominal pain.  : No dysuria, frequency, urgency or hematuria  MS: no pain to back or extremities *at this time, no loss of ROM, no weakness  Neuro: No headache or weakness. No LOC.  Skin: No skin rash.  Except as documented in the HPI, all other systems are negative.

## 2021-05-14 NOTE — PATIENT PROFILE ADULT - PRIMARY ROLES/RESPONSIBILITIES
Eucrisa Pregnancy And Lactation Text: This medication has not been assigned a Pregnancy Risk Category but animal studies failed to show danger with the topical medication. It is unknown if the medication is excreted in breast milk. none

## 2023-08-11 NOTE — DISCHARGE NOTE PROVIDER - NSDCADMDATE_GEN_ALL_CORE_FT
Intubation    Date/Time: 8/11/2023 7:57 AM    Performed by: Melissa Armneta CRNA  Authorized by: Karson Montenegro MD    Intubation:     Induction:  Inhalational - mask    Intubated:  Postinduction    Mask Ventilation:  Easy mask    Attempts:  1    Attempted By:  Student    Method of Intubation:  Direct    Blade:  Anton 1    Laryngeal View Grade: Grade I - full view of cords      Difficult Airway Encountered?: No      Complications:  None    Airway Device:  Oral corazon    Airway Device Size:  3.5    Style/Cuff Inflation:  Cuffed    Inflation Amount (mL):  1    Secured at:  The lips    Placement Verified By:  Capnometry    Complicating Factors:  None    Findings Post-Intubation:  BS equal bilateral and atraumatic/condition of teeth unchanged      
11-Jun-2019 23:04

## 2024-04-03 NOTE — H&P ADULT - ATTENDING COMMENTS
83 y/o Armenian speaking male from [place] with a hx of DM, HTN was BIB by family  1 episode of vomiting and 8 episodes of diarrhea.    Per daughter at bedside, pt arrived approx. 2 weeks ago and has been eating at home. He suddenly developed diarrhea this AM with nasuea and an episode of vomiting. She states he has had diarrhea in [place] approx. 6 months apart and was told he had viral infection. He has also had UTI in the past. Presently, pt and family denied a known hx of cirrhosis, ETOH use, blood transfusions, known hx of hepatitis. Pt does attest to weight loss over a course of 1 year although he cannot quantify and also tells me he has poor appetite. He complains of mild dysuria and urinary urgency, but does empty his bladder. Since being admitted to , he did not report further vomiting, but does complain of nausea.   He denied fevers, chills, SOB, CP or weakness     REVIEW OF SYSTEMS:    CONSTITUTIONAL: No weakness, fevers or chills  EYES/ENT: No visual changes;  No vertigo or throat pain   NECK: No pain or stiffness  RESPIRATORY: No cough, wheezing, hemoptysis; No shortness of breath  CARDIOVASCULAR: No chest pain or palpitations  GASTROINTESTINAL: No abdominal or epigastric pain. No nausea, vomiting, or hematemesis; No diarrhea or constipation. No melena or hematochezia.  GENITOURINARY: No dysuria, frequency or hematuria  NEUROLOGICAL: No numbness or weakness  SKIN: No itching, rashes    CT Abdomen and Pelvis w/ IV Cont (04.20.18 @ 18:48) >    IMPRESSION:  1. Colitis extending from the cecum to the splenic flexure. Differential   etiologies include infectious, inflammatory, and ischemic processes.  2. Pericholecystic inflammation, likely secondary to the colitis.  3. Cirrhosis. Mass within the hepatic dome as above. Nonemergent MRI with   and without contrast is recommended to exclude hepatocellular carcinoma.  4. Evidence of mild pulmonary edema at the lung bases.    Physical Exam:  General: WN/WD NAD  Neurology: A&Ox3, nonfocal, follows commands  Eyes: PERRLA/ EOMI  ENT/Neck: Neck supple, trachea midline, No JVD  Respiratory: CTA B/L, No wheezing, rales, rhonchi  CV: Normal rate regular rhythm, S1S2, no murmurs, rubs or gallops  Abdominal: Increased BS, Soft, mild tenderness in the lower abdomen, No Distention/ascites   Extremities: No edema, + peripheral pulses  Skin: No Rashes, Hematoma, Ecchymosis  Incisions: n/a  Tubes: n/a    A/P  Diarrhea / Colitis extending from the cecum to the splenic flexure/Pericholecystic inflammation, likely secondary to the colitis  -IV abx  -stools for C. diff, O and P , culture  -check blood culture    Cirrhosis/ Hepatic  Mass within the dome r/o HCC  -r/o Hep B/C - infectious panel  -GI eval   -MRI of liver ( can be outpatient)    UTI  - IV Abx - as above   - check UCx    JOSE - 2'doron to dehydration/ hyponatremia/hyperkalemia  -monitor UO, serial BMP ( s/p IV in the ED)  - if not improved     DM/HTN  - Rx to be verified later today   -F/s and BP monitoring
PAST SURGICAL HISTORY:  Bilateral cataracts     History of lung surgery     History of partial nephrectomy     History of thoracentesis     Pyemia     S/P bronchoscopy

## 2024-06-27 NOTE — ED PROVIDER NOTE - NS ED ATTENDING STATEMENT MOD
Detail Level: Zone Detail Level: Generalized Initiate Treatment: moisturizers on daily basis Attending Only